# Patient Record
Sex: FEMALE | Race: WHITE | NOT HISPANIC OR LATINO | Employment: FULL TIME | URBAN - NONMETROPOLITAN AREA
[De-identification: names, ages, dates, MRNs, and addresses within clinical notes are randomized per-mention and may not be internally consistent; named-entity substitution may affect disease eponyms.]

---

## 2016-12-28 LAB — EXTERNAL GROUP B STREP ANTIGEN: NORMAL

## 2017-01-03 ENCOUNTER — ROUTINE PRENATAL (OUTPATIENT)
Dept: OBSTETRICS AND GYNECOLOGY | Facility: CLINIC | Age: 22
End: 2017-01-03

## 2017-01-03 DIAGNOSIS — N89.8 VAGINAL DISCHARGE DURING PREGNANCY IN THIRD TRIMESTER: Primary | ICD-10-CM

## 2017-01-03 DIAGNOSIS — O99.333 TOBACCO SMOKING COMPLICATING PREGNANCY IN THIRD TRIMESTER: ICD-10-CM

## 2017-01-03 DIAGNOSIS — O26.893 VAGINAL DISCHARGE DURING PREGNANCY IN THIRD TRIMESTER: Primary | ICD-10-CM

## 2017-01-03 DIAGNOSIS — O99.323 DRUG USE AFFECTING PREGNANCY IN THIRD TRIMESTER: ICD-10-CM

## 2017-01-03 DIAGNOSIS — Z3A.37 37 WEEKS GESTATION OF PREGNANCY: ICD-10-CM

## 2017-01-03 PROCEDURE — 99212 OFFICE O/P EST SF 10 MIN: CPT | Performed by: ADVANCED PRACTICE MIDWIFE

## 2017-01-03 NOTE — MR AVS SNAPSHOT
Marleny Conn   1/3/2017 11:30 AM   Routine Prenatal    Dept Phone:  [phone]   Encounter #:  [id number]    Provider:  Carmen Mohan CNM   Department:  [facility]                Your Full Care Plan              Your Updated Medication List          This list is accurate as of: 1/3/17 11:48 AM.  Always use your most recent med list.                docusate sodium 100 MG capsule   Commonly known as:  COLACE   Take 1 capsule by mouth Daily As Needed for constipation.       MYNATAL PLUS PO               Instructions     None    Patient Instructions History      Upcoming Appointments     Visit Type Date Time Department    OB FOLLOWUP 1/3/2017 11:30 AM [facility]    OB FOLLOWUP 2017 10:45 AM [facility] [place] [facility]      [employer] Signup     [employer] allows you to send messages to your doctor, view your test results, renew your prescriptions, schedule appointments, and more. To sign up, go to [employer] and click on the Sign Up Now link in the New User? box. Enter your [facility] Activation Code exactly as it appears below along with the last four digits of your Social Security Number and your Date of Birth () to complete the sign-up process. If you do not sign up before the expiration date, you must request a new code.    [facility] Activation Code: [email or web address]  Expires: 2017 11:13 AM    If you have questions, you can email "[email or web address] or call [phone] to talk to our [facility] staff. Remember, [facility] is NOT to be used for urgent needs. For medical emergencies, dial 911.               Other Info from Your Visit           Your Appointments     2017 10:45 AM [facility]   OB FOLLOWUP with PETTY Thomason   [facility] (--)    [address] [place] [address] Dr  [facility] [address] [place] [id number] [phone]              Other Notes About Your Plan     Obese  Smoker-counseled on  quiting        Allergies     No Known Allergies

## 2017-01-09 ENCOUNTER — ROUTINE PRENATAL (OUTPATIENT)
Dept: OBSTETRICS AND GYNECOLOGY | Facility: CLINIC | Age: 22
End: 2017-01-09

## 2017-01-09 DIAGNOSIS — O99.333 TOBACCO SMOKING COMPLICATING PREGNANCY IN THIRD TRIMESTER: Primary | ICD-10-CM

## 2017-01-09 DIAGNOSIS — Z3A.38 38 WEEKS GESTATION OF PREGNANCY: ICD-10-CM

## 2017-01-09 PROCEDURE — 99212 OFFICE O/P EST SF 10 MIN: CPT | Performed by: ADVANCED PRACTICE MIDWIFE

## 2017-01-09 NOTE — MR AVS SNAPSHOT
Marleny Conn   2017 10:45 AM   Routine Prenatal    Dept Phone:  [phone]   Encounter #:  [id number]    Provider:  PETTY Thomason   Department:  [facility]                Your Full Care Plan              Today's Medication Changes          These changes are accurate as of: 17 11:09 AM.  If you have any questions, ask your nurse or doctor.               Stop taking medication(s)listed here:     docusate sodium 100 MG capsule   Commonly known as:  COLACE                      Your Updated Medication List          This list is accurate as of: 17 11:09 AM.  Always use your most recent med list.                MYNATAL PLUS PO               You Were Diagnosed With        Codes Comments    Tobacco smoking complicating pregnancy in third trimester    -  Primary ICD-10-CM: O99.333  ICD-9-CM: 649.03     38 weeks gestation of pregnancy     ICD-10-CM: Z3A.38  ICD-9-CM: V22.2       Instructions     None    Patient Instructions History      Upcoming Appointments     Visit Type Date Time Department    OB FOLLOWUP 2017 10:45 AM [facility] [place] [place] [facility]    OB FOLLOWUP 2017 10:45 AM [facility] [place] [place] [facility]     [facility] [employer] allows you to send messages to your doctor, view your test results, renew your prescriptions, schedule appointments, and more. To sign up, go to [email or web address] and click on the Sign Up Now link in the New User? box. Enter your [employer] Activation Code exactly as it appears below along with the last four digits of your Social Security Number and your Date of Birth () to complete the sign-up process. If you do not sign up before the expiration date, you must request a new code.    [employer] Activation Code: [email or web address]  Expires: 2017 11:13 AM    If you have questions, you can email [email or web address] or call [phone] to talk to our [employer] staff. Remember, [employer] is NOT to be used for  urgent needs. For medical emergencies, dial 911.               Other Info from Your Visit           Your Appointments     Jan 20, 2017 10:45 AM CST   OB FOLLOWUP with PETTY Thomason   [facility] OB GYN (--)    [address] [place] [address] Dr  [facility] [address] [place] [id number] [phone]              Other Notes About Your Plan     Obese  Smoker-counseled on quiting        Allergies     No Known Allergies      Reason for Visit     Routine Prenatal Visit           Problems and Diagnoses Noted     Tobacco smoking complicating pregnancy in third trimester    38 weeks gestation of pregnancy

## 2017-01-11 ENCOUNTER — HOSPITAL ENCOUNTER (OUTPATIENT)
Dept: OTHER | Facility: HOSPITAL | Age: 22
Discharge: HOME | End: 2017-01-12
Attending: OBSTETRICS & GYNECOLOGY | Admitting: OBSTETRICS & GYNECOLOGY

## 2017-01-13 ENCOUNTER — HOSPITAL ENCOUNTER (OUTPATIENT)
Dept: OTHER | Facility: HOSPITAL | Age: 22
Discharge: HOME | End: 2017-01-13
Attending: OBSTETRICS & GYNECOLOGY | Admitting: OBSTETRICS & GYNECOLOGY

## 2017-01-20 ENCOUNTER — ROUTINE PRENATAL (OUTPATIENT)
Dept: OBSTETRICS AND GYNECOLOGY | Facility: CLINIC | Age: 22
End: 2017-01-20

## 2017-01-20 DIAGNOSIS — O99.333 TOBACCO SMOKING COMPLICATING PREGNANCY IN THIRD TRIMESTER: Primary | ICD-10-CM

## 2017-01-20 DIAGNOSIS — O48.0 POST TERM PREGNANCY, UNSPECIFIED EPISODE OF CARE: ICD-10-CM

## 2017-01-20 DIAGNOSIS — O99.323 DRUG USE AFFECTING PREGNANCY IN THIRD TRIMESTER: ICD-10-CM

## 2017-01-20 DIAGNOSIS — Z3A.39 39 WEEKS GESTATION OF PREGNANCY: ICD-10-CM

## 2017-01-20 DIAGNOSIS — F12.10 CANNABIS ABUSE, UNSPECIFIED: ICD-10-CM

## 2017-01-20 PROCEDURE — 99212 OFFICE O/P EST SF 10 MIN: CPT | Performed by: ADVANCED PRACTICE MIDWIFE

## 2017-01-20 NOTE — MR AVS SNAPSHOT
Marleny Conn   2017 10:45 AM   Routine Prenatal    Dept Phone:  [phone]   Encounter #:  [id number]    Provider:  PETTY Thomason   Department:  [facility]                Your Full Care Plan              Your Updated Medication List          This list is accurate as of: 17 11:15 AM.  Always use your most recent med list.                MYNATAL PLUS PO               You Were Diagnosed With        Codes Comments    Tobacco smoking complicating pregnancy in third trimester    -  Primary ICD-10-CM: O99.333  ICD-9-CM: 649.03     Post term pregnancy, unspecified episode of care     ICD-10-CM: O48.0  ICD-9-CM: 645.10     Drug use affecting pregnancy in third trimester     ICD-10-CM: O99.323  ICD-9-CM: 648.43, 305.90     Cannabis abuse, unspecified     ICD-10-CM: F12.10  ICD-9-CM: 305.20     39 weeks gestation of pregnancy     ICD-10-CM: Z3A.39  ICD-9-CM: V22.2       Instructions     None    Patient Instructions History      Upcoming Appointments     Visit Type Date Time Department    OB FOLLOWUP 2017 10:45 AM [facility]    US FET BIO PRO WO N STR TST AD 2017  8:30 AM [facility]  MFM    OB FOLLOWUP 2017 10:30 AM [facility]     [facility] [employer] allows you to send messages to your doctor, view your test results, renew your prescriptions, schedule appointments, and more. To sign up, go to [employer] and click on the Sign Up Now link in the New User? box. Enter your [employer] Activation Code exactly as it appears below along with the last four digits of your Social Security Number and your Date of Birth () to complete the sign-up process. If you do not sign up before the expiration date, you must request a new code.    [employer] Activation Code: [email or web address]  Expires: 2/3/2017 11:09 AM    If you have questions, you can email [email or web address] or call [phone] to talk to our  MyChart staff. Remember, MyChart is NOT to be used for urgent needs. For medical emergencies, dial 911.               Other Info from Your Visit           Your Appointments     Jan 27, 2017  8:30 AM CST   US fet bio pro wo n str tst ad with [facility] MFM US 2   [facility]TR[facility] ([facility]    [address] [place] [address] [phone]           No prep required            Jan 27, 2017 10:30 AM CST   OB FOLLOWUP with PETTY Thomason   [facility] (--)    [address] [place] [address] Dr  [facility] [address] [place] [id number] [phone]              Other Notes About Your Plan     Obese  Smoker-counseled on quiting        Allergies     No Known Allergies      Reason for Visit     Routine Prenatal Visit           Problems and Diagnoses Noted     Drug use complicating pregnancy in third trimester    Tobacco smoking complicating pregnancy in third trimester    Post term pregnancy        Marijuana use        39 weeks gestation of pregnancy

## 2017-01-21 ENCOUNTER — HOSPITAL ENCOUNTER (OUTPATIENT)
Facility: HOSPITAL | Age: 22
Discharge: HOME | End: 2017-01-21
Attending: OBSTETRICS & GYNECOLOGY | Admitting: OBSTETRICS & GYNECOLOGY

## 2017-01-21 VITALS
DIASTOLIC BLOOD PRESSURE: 89 MMHG | HEART RATE: 82 BPM | RESPIRATION RATE: 18 BRPM | BODY MASS INDEX: 42.49 KG/M2 | HEIGHT: 65 IN | SYSTOLIC BLOOD PRESSURE: 132 MMHG | WEIGHT: 255 LBS | OXYGEN SATURATION: 99 % | TEMPERATURE: 98.6 F

## 2017-01-21 RX ORDER — SODIUM CHLORIDE 0.9 % (FLUSH) 0.9 %
1-10 SYRINGE (ML) INJECTION AS NEEDED
Status: DISCONTINUED | OUTPATIENT
Start: 2017-01-21 | End: 2017-01-21

## 2017-01-21 RX ORDER — DEXTROSE, SODIUM CHLORIDE, SODIUM LACTATE, POTASSIUM CHLORIDE, AND CALCIUM CHLORIDE 5; .6; .31; .03; .02 G/100ML; G/100ML; G/100ML; G/100ML; G/100ML
999 INJECTION, SOLUTION INTRAVENOUS ONCE
Status: DISCONTINUED | OUTPATIENT
Start: 2017-01-21 | End: 2017-01-21

## 2017-01-21 RX ORDER — DEXTROSE, SODIUM CHLORIDE, SODIUM LACTATE, POTASSIUM CHLORIDE, AND CALCIUM CHLORIDE 5; .6; .31; .03; .02 G/100ML; G/100ML; G/100ML; G/100ML; G/100ML
INJECTION, SOLUTION INTRAVENOUS
Status: DISCONTINUED
Start: 2017-01-21 | End: 2017-01-21 | Stop reason: HOSPADM

## 2017-01-22 ENCOUNTER — ANESTHESIA (OUTPATIENT)
Dept: LABOR AND DELIVERY | Facility: HOSPITAL | Age: 22
End: 2017-01-22

## 2017-01-22 ENCOUNTER — ANESTHESIA EVENT (OUTPATIENT)
Dept: LABOR AND DELIVERY | Facility: HOSPITAL | Age: 22
End: 2017-01-22

## 2017-01-22 ENCOUNTER — HOSPITAL ENCOUNTER (INPATIENT)
Facility: HOSPITAL | Age: 22
LOS: 2 days | Discharge: HOME | End: 2017-01-24
Attending: OBSTETRICS & GYNECOLOGY | Admitting: OBSTETRICS & GYNECOLOGY

## 2017-01-22 DIAGNOSIS — O99.333 TOBACCO SMOKING COMPLICATING PREGNANCY IN THIRD TRIMESTER: Primary | ICD-10-CM

## 2017-01-22 DIAGNOSIS — Z34.03 ENCOUNTER FOR PRENATAL CARE IN THIRD TRIMESTER OF FIRST PREGNANCY: ICD-10-CM

## 2017-01-22 DIAGNOSIS — O99.323 DRUG USE AFFECTING PREGNANCY IN THIRD TRIMESTER: ICD-10-CM

## 2017-01-22 LAB
ABO GROUP BLD: NORMAL
AMPHET+METHAMPHET UR QL: NEGATIVE
BARBITURATES UR QL SCN: NEGATIVE
BENZODIAZ UR QL SCN: NEGATIVE
BLD GP AB SCN SERPL QL: NEGATIVE
CANNABINOIDS SERPL QL: NEGATIVE
COCAINE UR QL: NEGATIVE
DEPRECATED RDW RBC AUTO: 41.2 FL (ref 36.4–46.3)
ERYTHROCYTE [DISTWIDTH] IN BLOOD BY AUTOMATED COUNT: 12.8 % (ref 11.5–14.5)
HCT VFR BLD AUTO: 37.1 % (ref 35–45)
HGB BLD-MCNC: 12.8 G/DL (ref 12–15.5)
Lab: NORMAL
MCH RBC QN AUTO: 30.7 PG (ref 26.5–34)
MCHC RBC AUTO-ENTMCNC: 34.5 G/DL (ref 31.4–36)
MCV RBC AUTO: 89 FL (ref 80–98)
METHADONE UR QL SCN: NEGATIVE
OPIATES UR QL: NEGATIVE
OXYCODONE UR QL SCN: NEGATIVE
PLATELET # BLD AUTO: 229 10*3/MM3 (ref 150–450)
PMV BLD AUTO: 11 FL (ref 8–12)
RBC # BLD AUTO: 4.17 10*6/MM3 (ref 3.77–5.16)
RH BLD: NEGATIVE
WBC NRBC COR # BLD: 23 10*3/MM3 (ref 3.2–9.8)

## 2017-01-22 PROCEDURE — 86900 BLOOD TYPING SEROLOGIC ABO: CPT

## 2017-01-22 PROCEDURE — 80307 DRUG TEST PRSMV CHEM ANLYZR: CPT

## 2017-01-22 PROCEDURE — 0KQM0ZZ REPAIR PERINEUM MUSCLE, OPEN APPROACH: ICD-10-PCS | Performed by: OBSTETRICS & GYNECOLOGY

## 2017-01-22 PROCEDURE — 25010000003 PENICILLIN G POTASSIUM PER 600000 UNITS: Performed by: OBSTETRICS & GYNECOLOGY

## 2017-01-22 PROCEDURE — 86901 BLOOD TYPING SEROLOGIC RH(D): CPT

## 2017-01-22 PROCEDURE — 59410 OBSTETRICAL CARE: CPT | Performed by: OBSTETRICS & GYNECOLOGY

## 2017-01-22 PROCEDURE — 25010000002 FENTANYL CITRATE (PF) 100 MCG/2ML SOLUTION 20 ML AMPULE: Performed by: OBSTETRICS & GYNECOLOGY

## 2017-01-22 PROCEDURE — C1755 CATHETER, INTRASPINAL: HCPCS

## 2017-01-22 PROCEDURE — 59025 FETAL NON-STRESS TEST: CPT

## 2017-01-22 PROCEDURE — 51702 INSERT TEMP BLADDER CATH: CPT

## 2017-01-22 PROCEDURE — 80307 DRUG TEST PRSMV CHEM ANLYZR: CPT | Performed by: OBSTETRICS & GYNECOLOGY

## 2017-01-22 PROCEDURE — 85027 COMPLETE CBC AUTOMATED: CPT | Performed by: OBSTETRICS & GYNECOLOGY

## 2017-01-22 PROCEDURE — G0463 HOSPITAL OUTPT CLINIC VISIT: HCPCS

## 2017-01-22 PROCEDURE — 86850 RBC ANTIBODY SCREEN: CPT

## 2017-01-22 RX ORDER — ERYTHROMYCIN 5 MG/G
OINTMENT OPHTHALMIC
Status: DISPENSED
Start: 2017-01-22 | End: 2017-01-23

## 2017-01-22 RX ORDER — MISOPROSTOL 200 UG/1
800 TABLET ORAL AS NEEDED
Status: DISCONTINUED | OUTPATIENT
Start: 2017-01-22 | End: 2017-01-22 | Stop reason: HOSPADM

## 2017-01-22 RX ORDER — PHYTONADIONE 1 MG/.5ML
INJECTION, EMULSION INTRAMUSCULAR; INTRAVENOUS; SUBCUTANEOUS
Status: DISPENSED
Start: 2017-01-22 | End: 2017-01-23

## 2017-01-22 RX ORDER — PROMETHAZINE HYDROCHLORIDE 25 MG/ML
12.5 INJECTION, SOLUTION INTRAMUSCULAR; INTRAVENOUS EVERY 6 HOURS PRN
Status: DISCONTINUED | OUTPATIENT
Start: 2017-01-22 | End: 2017-01-22 | Stop reason: HOSPADM

## 2017-01-22 RX ORDER — BISACODYL 10 MG
10 SUPPOSITORY, RECTAL RECTAL DAILY PRN
Status: DISCONTINUED | OUTPATIENT
Start: 2017-01-23 | End: 2017-01-24 | Stop reason: HOSPADM

## 2017-01-22 RX ORDER — PRENATAL VIT/IRON FUM/FOLIC AC 27MG-0.8MG
1 TABLET ORAL DAILY
Status: DISCONTINUED | OUTPATIENT
Start: 2017-01-22 | End: 2017-01-24 | Stop reason: HOSPADM

## 2017-01-22 RX ORDER — SODIUM CHLORIDE, SODIUM LACTATE, POTASSIUM CHLORIDE, CALCIUM CHLORIDE 600; 310; 30; 20 MG/100ML; MG/100ML; MG/100ML; MG/100ML
150 INJECTION, SOLUTION INTRAVENOUS CONTINUOUS
Status: DISCONTINUED | OUTPATIENT
Start: 2017-01-22 | End: 2017-01-22

## 2017-01-22 RX ORDER — DOCUSATE SODIUM 100 MG/1
100 CAPSULE, LIQUID FILLED ORAL 2 TIMES DAILY
Status: DISCONTINUED | OUTPATIENT
Start: 2017-01-22 | End: 2017-01-24 | Stop reason: HOSPADM

## 2017-01-22 RX ORDER — LIDOCAINE HYDROCHLORIDE 10 MG/ML
5 INJECTION, SOLUTION INFILTRATION; PERINEURAL AS NEEDED
Status: DISCONTINUED | OUTPATIENT
Start: 2017-01-22 | End: 2017-01-22 | Stop reason: HOSPADM

## 2017-01-22 RX ORDER — MAGNESIUM CARB/ALUMINUM HYDROX 105-160MG
TABLET,CHEWABLE ORAL
Status: DISPENSED
Start: 2017-01-22 | End: 2017-01-23

## 2017-01-22 RX ORDER — PROMETHAZINE HYDROCHLORIDE 12.5 MG/1
12.5 SUPPOSITORY RECTAL EVERY 6 HOURS PRN
Status: DISCONTINUED | OUTPATIENT
Start: 2017-01-22 | End: 2017-01-22 | Stop reason: HOSPADM

## 2017-01-22 RX ORDER — SODIUM CHLORIDE, SODIUM LACTATE, POTASSIUM CHLORIDE, CALCIUM CHLORIDE 600; 310; 30; 20 MG/100ML; MG/100ML; MG/100ML; MG/100ML
125 INJECTION, SOLUTION INTRAVENOUS CONTINUOUS
Status: DISCONTINUED | OUTPATIENT
Start: 2017-01-22 | End: 2017-01-22

## 2017-01-22 RX ORDER — OXYCODONE HYDROCHLORIDE AND ACETAMINOPHEN 5; 325 MG/1; MG/1
1 TABLET ORAL EVERY 4 HOURS PRN
Status: DISCONTINUED | OUTPATIENT
Start: 2017-01-22 | End: 2017-01-24 | Stop reason: HOSPADM

## 2017-01-22 RX ORDER — SODIUM CHLORIDE 0.9 % (FLUSH) 0.9 %
1-10 SYRINGE (ML) INJECTION AS NEEDED
Status: DISCONTINUED | OUTPATIENT
Start: 2017-01-22 | End: 2017-01-22 | Stop reason: HOSPADM

## 2017-01-22 RX ORDER — LIDOCAINE HYDROCHLORIDE AND EPINEPHRINE BITARTRATE 20; .01 MG/ML; MG/ML
INJECTION, SOLUTION SUBCUTANEOUS AS NEEDED
Status: DISCONTINUED | OUTPATIENT
Start: 2017-01-22 | End: 2017-01-22 | Stop reason: SURG

## 2017-01-22 RX ORDER — ZOLPIDEM TARTRATE 5 MG/1
5 TABLET ORAL NIGHTLY PRN
Status: DISCONTINUED | OUTPATIENT
Start: 2017-01-22 | End: 2017-01-24 | Stop reason: HOSPADM

## 2017-01-22 RX ORDER — CARBOPROST TROMETHAMINE 250 UG/ML
250 INJECTION, SOLUTION INTRAMUSCULAR AS NEEDED
Status: DISCONTINUED | OUTPATIENT
Start: 2017-01-22 | End: 2017-01-22 | Stop reason: HOSPADM

## 2017-01-22 RX ORDER — OXYTOCIN/RINGER'S LACTATE 20/1000 ML
1000 PLASTIC BAG, INJECTION (ML) INTRAVENOUS CONTINUOUS
Status: DISCONTINUED | OUTPATIENT
Start: 2017-01-22 | End: 2017-01-22

## 2017-01-22 RX ORDER — PROMETHAZINE HYDROCHLORIDE 12.5 MG/1
12.5 TABLET ORAL EVERY 6 HOURS PRN
Status: DISCONTINUED | OUTPATIENT
Start: 2017-01-22 | End: 2017-01-22 | Stop reason: HOSPADM

## 2017-01-22 RX ORDER — SODIUM CHLORIDE, SODIUM LACTATE, POTASSIUM CHLORIDE, CALCIUM CHLORIDE 600; 310; 30; 20 MG/100ML; MG/100ML; MG/100ML; MG/100ML
INJECTION, SOLUTION INTRAVENOUS
Status: COMPLETED
Start: 2017-01-22 | End: 2017-01-22

## 2017-01-22 RX ORDER — SODIUM CHLORIDE 0.9 % (FLUSH) 0.9 %
1-10 SYRINGE (ML) INJECTION AS NEEDED
Status: DISCONTINUED | OUTPATIENT
Start: 2017-01-22 | End: 2017-01-24 | Stop reason: HOSPADM

## 2017-01-22 RX ORDER — ONDANSETRON 2 MG/ML
4 INJECTION INTRAMUSCULAR; INTRAVENOUS ONCE AS NEEDED
Status: DISCONTINUED | OUTPATIENT
Start: 2017-01-22 | End: 2017-01-22 | Stop reason: HOSPADM

## 2017-01-22 RX ORDER — OXYTOCIN/RINGER'S LACTATE 20/1000 ML
999 PLASTIC BAG, INJECTION (ML) INTRAVENOUS CONTINUOUS PRN
Status: DISCONTINUED | OUTPATIENT
Start: 2017-01-22 | End: 2017-01-22

## 2017-01-22 RX ORDER — METHYLERGONOVINE MALEATE 0.2 MG/ML
200 INJECTION INTRAVENOUS ONCE AS NEEDED
Status: DISCONTINUED | OUTPATIENT
Start: 2017-01-22 | End: 2017-01-22 | Stop reason: HOSPADM

## 2017-01-22 RX ORDER — IBUPROFEN 800 MG/1
800 TABLET ORAL EVERY 6 HOURS SCHEDULED
Status: COMPLETED | OUTPATIENT
Start: 2017-01-22 | End: 2017-01-24

## 2017-01-22 RX ADMIN — SODIUM CHLORIDE 5 MILLION UNITS: 9 INJECTION, SOLUTION INTRAVENOUS at 11:31

## 2017-01-22 RX ADMIN — DOCUSATE SODIUM 100 MG: 100 CAPSULE, LIQUID FILLED ORAL at 19:45

## 2017-01-22 RX ADMIN — IBUPROFEN 800 MG: 800 TABLET ORAL at 19:44

## 2017-01-22 RX ADMIN — SODIUM CHLORIDE, POTASSIUM CHLORIDE, SODIUM LACTATE AND CALCIUM CHLORIDE 1000 ML: 600; 310; 30; 20 INJECTION, SOLUTION INTRAVENOUS at 10:30

## 2017-01-22 RX ADMIN — FENTANYL CITRATE 6 ML/HR: 50 INJECTION, SOLUTION INTRAMUSCULAR; INTRAVENOUS at 11:19

## 2017-01-22 RX ADMIN — Medication 999 ML/HR: at 14:29

## 2017-01-22 RX ADMIN — SODIUM CHLORIDE, POTASSIUM CHLORIDE, SODIUM LACTATE AND CALCIUM CHLORIDE 125 ML/HR: 600; 310; 30; 20 INJECTION, SOLUTION INTRAVENOUS at 11:15

## 2017-01-22 RX ADMIN — LIDOCAINE HYDROCHLORIDE AND EPINEPHRINE BITARTRATE 3 ML: 20; 10 INJECTION, SOLUTION SUBCUTANEOUS at 11:14

## 2017-01-22 NOTE — ANESTHESIA PROCEDURE NOTES
Labor Epidural    Patient location during procedure: OB  Performed By  CRNA: ADELINE DAVIS  Preanesthetic Checklist  Completed: patient identified, site marked, surgical consent, pre-op evaluation, timeout performed, IV checked, risks and benefits discussed and monitors and equipment checked  Epidural Block Prep:  Pt Position:sitting  Sterile Tech:gloves, cap, gown, mask and sterile barrier  Monitoring:blood pressure monitoring and continuous pulse oximetry  Epidural Block Procedure:  Approach:midline  Location:L3-L4  Needle Type:Tuohy  Needle Gauge:17 G  Loss of Resistance: 8cm  Cath Depth at skin:12 cm  Paresthesia: none  Aspiration:negative  Test Dose:negative  Post Assessment:  Dressing:occlusive dressing applied and secured with tape  Pt Tolerance:patient tolerated the procedure well with no apparent complications  Complications:no

## 2017-01-22 NOTE — ANESTHESIA PREPROCEDURE EVALUATION
Anesthesia Evaluation     Patient summary reviewed and Nursing notes reviewed    Airway   Mallampati: II  TM distance: >3 FB  Neck ROM: full  no difficulty expected  Dental - normal exam     Pulmonary - negative pulmonary ROS and normal exam   Cardiovascular - negative cardio ROS and normal exam    Neuro/Psych- negative ROS  GI/Hepatic/Renal/Endo    (+) morbid obesity,     Musculoskeletal (-) negative ROS    Abdominal  - normal exam   Substance History - negative use     OB/GYN    (+) Pregnant,         Other                             Anesthesia Plan    ASA 3     epidural     Anesthetic plan and risks discussed with patient.

## 2017-01-22 NOTE — ANESTHESIA POSTPROCEDURE EVALUATION
Patient: Marleny Conn    Procedure Summary     Date Anesthesia Start Anesthesia Stop Room / Location    01/22/17 [id number]        Procedure Diagnosis Scheduled Providers Provider    LABOR ANALGESIA No diagnosis on file.  Aurelio Guerrero CRNA          Anesthesia Type: epidural  Last vitals  /64 (01/22/17 1445)    Temp      Pulse 116 (01/22/17 1445)   Resp      SpO2        Post Anesthesia Care and Evaluation    Patient location during evaluation: bedside  Level of consciousness: awake and alert  Pain management: adequate  Airway patency: patent  Anesthetic complications: No anesthetic complications  PONV Status: none  Cardiovascular status: acceptable  Respiratory status: acceptable  Hydration status: acceptable  Post Neuraxial Block status: Motor and sensory function returned to baseline

## 2017-01-23 RX ADMIN — PRENATAL VIT W/ FE FUMARATE-FA TAB 27-0.8 MG 1 TABLET: 27-0.8 TAB at 08:05

## 2017-01-23 RX ADMIN — IBUPROFEN 800 MG: 800 TABLET ORAL at 18:11

## 2017-01-23 RX ADMIN — IBUPROFEN 800 MG: 800 TABLET ORAL at 12:46

## 2017-01-23 RX ADMIN — DOCUSATE SODIUM 100 MG: 100 CAPSULE, LIQUID FILLED ORAL at 21:07

## 2017-01-23 RX ADMIN — DOCUSATE SODIUM 100 MG: 100 CAPSULE, LIQUID FILLED ORAL at 08:05

## 2017-01-23 RX ADMIN — IBUPROFEN 800 MG: 800 TABLET ORAL at 00:43

## 2017-01-23 RX ADMIN — IBUPROFEN 800 MG: 800 TABLET ORAL at 06:08

## 2017-01-24 VITALS
BODY MASS INDEX: 42.49 KG/M2 | SYSTOLIC BLOOD PRESSURE: 138 MMHG | OXYGEN SATURATION: 97 % | WEIGHT: 255 LBS | RESPIRATION RATE: 18 BRPM | HEART RATE: 83 BPM | DIASTOLIC BLOOD PRESSURE: 70 MMHG | HEIGHT: 65 IN | TEMPERATURE: 97.8 F

## 2017-01-24 PROBLEM — Z34.03 ENCOUNTER FOR PRENATAL CARE IN THIRD TRIMESTER OF FIRST PREGNANCY: Status: RESOLVED | Noted: 2017-01-22 | Resolved: 2017-01-24

## 2017-01-24 RX ORDER — IBUPROFEN 800 MG/1
800 TABLET ORAL EVERY 6 HOURS PRN
Qty: 30 TABLET | Refills: 0 | Status: SHIPPED | OUTPATIENT
Start: 2017-01-24 | End: 2017-01-25

## 2017-01-24 RX ADMIN — PRENATAL VIT W/ FE FUMARATE-FA TAB 27-0.8 MG 1 TABLET: 27-0.8 TAB at 08:37

## 2017-01-24 RX ADMIN — IBUPROFEN 800 MG: 800 TABLET ORAL at 00:25

## 2017-01-24 RX ADMIN — IBUPROFEN 800 MG: 800 TABLET ORAL at 11:52

## 2017-01-24 RX ADMIN — IBUPROFEN 800 MG: 800 TABLET ORAL at 05:53

## 2017-01-24 RX ADMIN — DOCUSATE SODIUM 100 MG: 100 CAPSULE, LIQUID FILLED ORAL at 08:37

## 2017-02-10 ENCOUNTER — OFFICE VISIT (OUTPATIENT)
Dept: OBSTETRICS AND GYNECOLOGY | Facility: CLINIC | Age: 22
End: 2017-02-10

## 2017-02-10 VITALS
DIASTOLIC BLOOD PRESSURE: 74 MMHG | WEIGHT: 229 LBS | BODY MASS INDEX: 39.09 KG/M2 | SYSTOLIC BLOOD PRESSURE: 117 MMHG | HEIGHT: 64 IN

## 2017-02-10 DIAGNOSIS — Z30.011 ORAL CONTRACEPTION INITIAL PRESCRIPTION: ICD-10-CM

## 2017-02-10 PROCEDURE — 99024 POSTOP FOLLOW-UP VISIT: CPT | Performed by: ADVANCED PRACTICE MIDWIFE

## 2017-02-10 RX ORDER — NORETHINDRONE ACETATE AND ETHINYL ESTRADIOL 1MG-20(21)
1 KIT ORAL DAILY
Qty: 28 TABLET | Refills: 12 | Status: SHIPPED | OUTPATIENT
Start: 2017-02-10 | End: 2017-03-10

## 2017-02-10 RX ORDER — IBUPROFEN 800 MG/1
800 TABLET ORAL EVERY 6 HOURS PRN
COMMUNITY
End: 2018-02-27 | Stop reason: HOSPADM

## 2017-02-10 NOTE — PROGRESS NOTES
"Subjective   Marleny Conn is a 21 y.o. female who presents for a postpartum visit. She is 2 weeks postpartum following a spontaneous vaginal delivery. I have fully reviewed the prenatal and intrapartum course. The delivery was at 40  gestational weeks. Outcome: vacuum, low. Anesthesia: epidural. Postpartum course has been unremarkable. Baby's course has been unremarkable. Baby is feeding by formula. Bleeding thin lochia. Bowel function is normal. Bladder function is normal. Patient is not sexually active. Contraception method is none. Postpartum depression screening: negative.    The following portions of the patient's history were reviewed and updated as appropriate: allergies, current medications, past family history, past medical history, past social history, past surgical history and problem list.    Review of Systems  Constitutional: negative  Eyes: negative  Ears, nose, mouth, throat, and face: negative  Respiratory: negative  Cardiovascular: negative  Gastrointestinal: negative  Genitourinary:negative    Objective   Visit Vitals   • /74   • Ht 64\" (162.6 cm)   • Wt 229 lb (104 kg)   • LMP 04/17/2016 (Approximate)   • BMI 39.31 kg/m2      General:  alert, appears stated age and cooperative    Breasts:  inspection negative, no nipple discharge or bleeding, no masses or nodularity palpable   Lungs: clear to auscultation bilaterally   Heart:  regular rate and rhythm, S1, S2 normal, no murmur, click, rub or gallop   Abdomen: soft, non-tender; bowel sounds normal; no masses,  no organomegaly   skin Warm to touch, no rash   muskuloskeletal  Normal gait, full ROM   HEENT BRE, normocephalic                  Assessment/Plan   2 weeks postpartum exam. Pap smear not done at today's visit. Last pap 7/5/16 negative     1. Contraception: OCP (estrogen/progesterone).  Recommended walking.  Advised patient patient to take prenatal vitamins daily or folic acid daily, patient verbalized understanding.  2.  " Patient is to have pelvic rest for the next 4 weeks  3. Follow up in: 4 weeks or as needed.

## 2017-03-08 ENCOUNTER — OFFICE VISIT (OUTPATIENT)
Dept: OBSTETRICS AND GYNECOLOGY | Facility: CLINIC | Age: 22
End: 2017-03-08

## 2017-03-08 VITALS
WEIGHT: 220 LBS | DIASTOLIC BLOOD PRESSURE: 69 MMHG | SYSTOLIC BLOOD PRESSURE: 115 MMHG | BODY MASS INDEX: 37.56 KG/M2 | HEIGHT: 64 IN

## 2017-03-08 PROCEDURE — 99024 POSTOP FOLLOW-UP VISIT: CPT | Performed by: ADVANCED PRACTICE MIDWIFE

## 2017-03-08 RX ORDER — ESCITALOPRAM OXALATE 10 MG/1
10 TABLET ORAL DAILY
Qty: 30 TABLET | Refills: 10 | Status: SHIPPED | OUTPATIENT
Start: 2017-03-08 | End: 2018-02-27 | Stop reason: HOSPADM

## 2017-03-08 NOTE — PROGRESS NOTES
"Subjective   Marleny Conn is a 21 y.o. female who presents for a postpartum visit. She is 6 weeks postpartum following a vacuum assisted. I have fully reviewed the prenatal and intrapartum course. The delivery was at 40 gestational weeks. Outcome: spontaneous vaginal delivery and vacuum, low. Anesthesia: epidural. Postpartum course has been unremarkable. Baby's course has been unremarkable. Baby is feeding by formula. Bleeding started period 3/7/14. Bowel function is normal. Bladder function is normal. Patient is not sexually active. Contraception method is OCP (estrogen/progesterone). Postpartum depression screening: positive.    The following portions of the patient's history were reviewed and updated as appropriate: allergies, current medications, past family history, past medical history, past social history, past surgical history and problem list.    Review of Systems  Constitutional: negative  Eyes: negative  Ears, nose, mouth, throat, and face: negative  Respiratory: negative  Cardiovascular: negative  Gastrointestinal: negative  Genitourinary:negative    Objective   Visit Vitals   • /69   • Ht 64\" (162.6 cm)   • Wt 220 lb (99.8 kg)   • LMP 03/07/2016   • Breastfeeding No   • BMI 37.76 kg/m2      General:  alert, appears stated age and cooperative   HEENT BRE, normocephalic, no thyroidmegaly   Lungs: clear to auscultation bilaterally   Heart:  regular rate and rhythm, S1, S2 normal, no murmur, click, rub or gallop   Abdomen: soft, non-tender; bowel sounds normal; no masses,  no organomegaly   skin Warm to touch, no skin rash   muskuloskeletal  Full ROM, normal gait    Affect/psych Normal affect, answered questions properly                 Assessment/Plan   6 weeks postpartum exam. Pap smear not done at today's visit. Needs pap in July 1. Contraception: OCP (estrogen/progesterone)  2. Depression- Started patient on lexapro, patient requested low dose, encouraged patient to call if she " doesn't see improvement in 4 weeks. To report suicidal thoughts   3. Follow up in: 4 months or as needed.  4. Patient declined TSH check, reported problems swallowing     Marleny was seen today for postpartum care.    Diagnoses and all orders for this visit:    Routine postpartum follow-up  -     TSH    Postpartum depression    Other orders  -     escitalopram (LEXAPRO) 10 MG tablet; Take 1 tablet by mouth Daily.

## 2018-02-27 ENCOUNTER — INITIAL PRENATAL (OUTPATIENT)
Dept: OBSTETRICS AND GYNECOLOGY | Facility: CLINIC | Age: 23
End: 2018-02-27

## 2018-02-27 ENCOUNTER — APPOINTMENT (OUTPATIENT)
Dept: LAB | Facility: HOSPITAL | Age: 23
End: 2018-02-27

## 2018-02-27 VITALS — SYSTOLIC BLOOD PRESSURE: 100 MMHG | DIASTOLIC BLOOD PRESSURE: 59 MMHG | BODY MASS INDEX: 39.14 KG/M2 | WEIGHT: 228 LBS

## 2018-02-27 DIAGNOSIS — O09.30 INSUFFICIENT ANTEPARTUM CARE: ICD-10-CM

## 2018-02-27 DIAGNOSIS — Z3A.22 22 WEEKS GESTATION OF PREGNANCY: ICD-10-CM

## 2018-02-27 DIAGNOSIS — O99.210 OBESITY AFFECTING PREGNANCY, ANTEPARTUM: Primary | ICD-10-CM

## 2018-02-27 DIAGNOSIS — Z34.80 PRENATAL CARE OF MULTIGRAVIDA, ANTEPARTUM: ICD-10-CM

## 2018-02-27 DIAGNOSIS — Z36.89 ENCOUNTER FOR FETAL ANATOMIC SURVEY: ICD-10-CM

## 2018-02-27 PROBLEM — O26.899 RH NEGATIVE STATE IN ANTEPARTUM PERIOD: Status: ACTIVE | Noted: 2018-02-27

## 2018-02-27 PROBLEM — Z67.91 RH NEGATIVE STATE IN ANTEPARTUM PERIOD: Status: ACTIVE | Noted: 2018-02-27

## 2018-02-27 LAB
ABO GROUP BLD: NORMAL
AMPHET+METHAMPHET UR QL: NEGATIVE
BARBITURATES UR QL SCN: NEGATIVE
BASOPHILS # BLD AUTO: 0.01 10*3/MM3 (ref 0–0.2)
BASOPHILS NFR BLD AUTO: 0.1 % (ref 0–2)
BENZODIAZ UR QL SCN: NEGATIVE
BILIRUB UR QL STRIP: NEGATIVE
BLD GP AB SCN SERPL QL: NEGATIVE
CANNABINOIDS SERPL QL: NEGATIVE
CLARITY UR: ABNORMAL
COCAINE UR QL: NEGATIVE
COLOR UR: YELLOW
DEPRECATED RDW RBC AUTO: 40 FL (ref 36.4–46.3)
EOSINOPHIL # BLD AUTO: 0.26 10*3/MM3 (ref 0–0.7)
EOSINOPHIL NFR BLD AUTO: 1.8 % (ref 0–7)
ERYTHROCYTE [DISTWIDTH] IN BLOOD BY AUTOMATED COUNT: 12 % (ref 11.5–14.5)
GLUCOSE UR STRIP-MCNC: NEGATIVE MG/DL
HCT VFR BLD AUTO: 36.4 % (ref 35–45)
HGB BLD-MCNC: 12.5 G/DL (ref 12–15.5)
HGB UR QL STRIP.AUTO: NEGATIVE
IMM GRANULOCYTES # BLD: 0.07 10*3/MM3 (ref 0–0.02)
IMM GRANULOCYTES NFR BLD: 0.5 % (ref 0–0.5)
KETONES UR QL STRIP: NEGATIVE
LEUKOCYTE ESTERASE UR QL STRIP.AUTO: ABNORMAL
LYMPHOCYTES # BLD AUTO: 3.24 10*3/MM3 (ref 0.6–4.2)
LYMPHOCYTES NFR BLD AUTO: 21.9 % (ref 10–50)
Lab: NORMAL
MCH RBC QN AUTO: 31.3 PG (ref 26.5–34)
MCHC RBC AUTO-ENTMCNC: 34.3 G/DL (ref 31.4–36)
MCV RBC AUTO: 91 FL (ref 80–98)
METHADONE UR QL SCN: NEGATIVE
MONOCYTES # BLD AUTO: 0.63 10*3/MM3 (ref 0–0.9)
MONOCYTES NFR BLD AUTO: 4.3 % (ref 0–12)
NEUTROPHILS # BLD AUTO: 10.6 10*3/MM3 (ref 2–8.6)
NEUTROPHILS NFR BLD AUTO: 71.4 % (ref 37–80)
NITRITE UR QL STRIP: NEGATIVE
OPIATES UR QL: NEGATIVE
OXYCODONE UR QL SCN: NEGATIVE
PH UR STRIP.AUTO: 6 [PH] (ref 5–9)
PLATELET # BLD AUTO: 235 10*3/MM3 (ref 150–450)
PMV BLD AUTO: 10.4 FL (ref 8–12)
PROT UR QL STRIP: NEGATIVE
RBC # BLD AUTO: 4 10*6/MM3 (ref 3.77–5.16)
RH BLD: NEGATIVE
SP GR UR STRIP: 1.02 (ref 1–1.03)
UROBILINOGEN UR QL STRIP: ABNORMAL
WBC NRBC COR # BLD: 14.81 10*3/MM3 (ref 3.2–9.8)

## 2018-02-27 PROCEDURE — 80307 DRUG TEST PRSMV CHEM ANLYZR: CPT | Performed by: ADVANCED PRACTICE MIDWIFE

## 2018-02-27 PROCEDURE — 86803 HEPATITIS C AB TEST: CPT | Performed by: ADVANCED PRACTICE MIDWIFE

## 2018-02-27 PROCEDURE — 87086 URINE CULTURE/COLONY COUNT: CPT | Performed by: ADVANCED PRACTICE MIDWIFE

## 2018-02-27 PROCEDURE — 87491 CHLMYD TRACH DNA AMP PROBE: CPT | Performed by: ADVANCED PRACTICE MIDWIFE

## 2018-02-27 PROCEDURE — 86900 BLOOD TYPING SEROLOGIC ABO: CPT | Performed by: ADVANCED PRACTICE MIDWIFE

## 2018-02-27 PROCEDURE — 87591 N.GONORRHOEAE DNA AMP PROB: CPT | Performed by: ADVANCED PRACTICE MIDWIFE

## 2018-02-27 PROCEDURE — 87661 TRICHOMONAS VAGINALIS AMPLIF: CPT | Performed by: ADVANCED PRACTICE MIDWIFE

## 2018-02-27 PROCEDURE — G0432 EIA HIV-1/HIV-2 SCREEN: HCPCS | Performed by: ADVANCED PRACTICE MIDWIFE

## 2018-02-27 PROCEDURE — 36415 COLL VENOUS BLD VENIPUNCTURE: CPT | Performed by: ADVANCED PRACTICE MIDWIFE

## 2018-02-27 PROCEDURE — 81003 URINALYSIS AUTO W/O SCOPE: CPT | Performed by: ADVANCED PRACTICE MIDWIFE

## 2018-02-27 PROCEDURE — 99213 OFFICE O/P EST LOW 20 MIN: CPT | Performed by: ADVANCED PRACTICE MIDWIFE

## 2018-02-27 PROCEDURE — 85025 COMPLETE CBC W/AUTO DIFF WBC: CPT | Performed by: ADVANCED PRACTICE MIDWIFE

## 2018-02-27 PROCEDURE — 86901 BLOOD TYPING SEROLOGIC RH(D): CPT | Performed by: ADVANCED PRACTICE MIDWIFE

## 2018-02-27 PROCEDURE — 86850 RBC ANTIBODY SCREEN: CPT | Performed by: ADVANCED PRACTICE MIDWIFE

## 2018-02-27 RX ORDER — PRENATAL VIT/IRON FUM/FOLIC AC 65 MG-1 MG
1 TABLET ORAL DAILY
Qty: 30 EACH | Refills: 12 | Status: ON HOLD | OUTPATIENT
Start: 2018-02-27 | End: 2018-07-05

## 2018-02-27 NOTE — PROGRESS NOTES
CC: new OB visit, history obtained and  reviewed see history tabs. Patient stated that her LMP was back in July     ROS: Negative leaking fluid from the vagina, swelling in her legs, headache, visual changes, low back pain and heartburn      Educated on:Spent 30 minutes out of 45 minutes face to face counseling on nutrition, activity, diet, safety, prenatal care, medications approved in pregnancy, pregnancy discomforts, and testing.       A/Plan: f/u in ASA for anatomy   Marleny was seen today for initial prenatal visit.    Diagnoses and all orders for this visit:    Obesity affecting pregnancy, antepartum    Prenatal care of multigravida, antepartum  -     OB Panel With HIV  -     Hepatitis C Antibody  -     Urine Drug Screen - Urine, Clean Catch  -     Urine Culture - Urine, Urine, Clean Catch  -     Urinalysis - Urine, Clean Catch  -     Chlamydia trachomatis, Neisseria gonorrhoeae, Trichomonas vaginalis, PCR - Urine, Urine, Clean Catch  -     US Ob Limited 1 + Fetuses  -     RPR  -     CBC Auto Differential  -     Hepatitis B Surface Antigen  -     Rubella Antibody, IgG  -     OB Panel Type & Screen  -     HIV-1 & HIV-2 Antibodies  -     PREVIOUS HISTORY; Future  -     PREVIOUS HISTORY    Insufficient antepartum care    Other orders  -     Prenatal Vit-Fe Fumarate-FA (MYNATAL PLUS) tablet; Take 1 tablet by mouth Daily.

## 2018-02-28 ENCOUNTER — ROUTINE PRENATAL (OUTPATIENT)
Dept: OBSTETRICS AND GYNECOLOGY | Facility: CLINIC | Age: 23
End: 2018-02-28

## 2018-02-28 VITALS — DIASTOLIC BLOOD PRESSURE: 68 MMHG | WEIGHT: 228 LBS | SYSTOLIC BLOOD PRESSURE: 111 MMHG | BODY MASS INDEX: 39.14 KG/M2

## 2018-02-28 DIAGNOSIS — Z34.80 ENCOUNTER FOR SUPERVISION OF NORMAL PREGNANCY IN MULTIGRAVIDA: ICD-10-CM

## 2018-02-28 DIAGNOSIS — Z3A.22 22 WEEKS GESTATION OF PREGNANCY: Primary | ICD-10-CM

## 2018-02-28 LAB
BACTERIA SPEC AEROBE CULT: NORMAL
C TRACH RRNA CVX QL NAA+PROBE: NEGATIVE
HBV SURFACE AG SERPL QL IA: NEGATIVE
HCV AB SER DONR QL: NEGATIVE
HIV1+2 AB SER QL: NEGATIVE
N GONORRHOEA RRNA SPEC QL NAA+PROBE: NEGATIVE
RPR SER QL: NORMAL
RUBV IGG SER QL: ABNORMAL
RUBV IGG SER-ACNC: 8.9 IU/ML (ref 0–9.9)
T VAGINALIS DNA VAG QL PROBE+SIG AMP: NEGATIVE

## 2018-02-28 PROCEDURE — 99212 OFFICE O/P EST SF 10 MIN: CPT | Performed by: ADVANCED PRACTICE MIDWIFE

## 2018-03-05 PROBLEM — Z78.9 NOT IMMUNE TO RUBELLA: Status: ACTIVE | Noted: 2018-03-05

## 2018-03-27 ENCOUNTER — HOSPITAL ENCOUNTER (OUTPATIENT)
Facility: HOSPITAL | Age: 23
Discharge: HOME | End: 2018-03-27
Attending: OBSTETRICS & GYNECOLOGY | Admitting: OBSTETRICS & GYNECOLOGY

## 2018-03-27 VITALS — TEMPERATURE: 97.9 F | RESPIRATION RATE: 18 BRPM | HEART RATE: 69 BPM | OXYGEN SATURATION: 98 %

## 2018-03-27 DIAGNOSIS — Z34.82 PRENATAL CARE, SUBSEQUENT PREGNANCY IN SECOND TRIMESTER: Primary | ICD-10-CM

## 2018-03-27 LAB
BILIRUB UR QL STRIP: ABNORMAL
CLARITY UR: CLEAR
COLOR UR: ABNORMAL
GLUCOSE UR STRIP-MCNC: NEGATIVE MG/DL
HGB UR QL STRIP.AUTO: NEGATIVE
KETONES UR QL STRIP: NEGATIVE
LEUKOCYTE ESTERASE UR QL STRIP.AUTO: NEGATIVE
NITRITE UR QL STRIP: NEGATIVE
PH UR STRIP.AUTO: 6.5 [PH] (ref 5–9)
PROT UR QL STRIP: ABNORMAL
SP GR UR STRIP: 1.03 (ref 1–1.03)
UROBILINOGEN UR QL STRIP: ABNORMAL

## 2018-03-27 PROCEDURE — G0463 HOSPITAL OUTPT CLINIC VISIT: HCPCS

## 2018-03-27 PROCEDURE — 59025 FETAL NON-STRESS TEST: CPT

## 2018-03-27 PROCEDURE — 81003 URINALYSIS AUTO W/O SCOPE: CPT | Performed by: ADVANCED PRACTICE MIDWIFE

## 2018-03-28 ENCOUNTER — LAB (OUTPATIENT)
Dept: LAB | Facility: HOSPITAL | Age: 23
End: 2018-03-28

## 2018-03-28 ENCOUNTER — ROUTINE PRENATAL (OUTPATIENT)
Dept: OBSTETRICS AND GYNECOLOGY | Facility: CLINIC | Age: 23
End: 2018-03-28

## 2018-03-28 VITALS — DIASTOLIC BLOOD PRESSURE: 64 MMHG | BODY MASS INDEX: 40.51 KG/M2 | WEIGHT: 236 LBS | SYSTOLIC BLOOD PRESSURE: 117 MMHG

## 2018-03-28 DIAGNOSIS — R10.2 PELVIC PRESSURE IN PREGNANCY: ICD-10-CM

## 2018-03-28 DIAGNOSIS — R10.9 ABDOMINAL PAIN AFFECTING PREGNANCY: Primary | ICD-10-CM

## 2018-03-28 DIAGNOSIS — O09.893 SHORT INTERVAL BETWEEN PREGNANCIES AFFECTING PREGNANCY IN THIRD TRIMESTER, ANTEPARTUM: ICD-10-CM

## 2018-03-28 DIAGNOSIS — Z67.91 RH NEGATIVE STATE IN ANTEPARTUM PERIOD: ICD-10-CM

## 2018-03-28 DIAGNOSIS — O26.899 RH NEGATIVE STATE IN ANTEPARTUM PERIOD: ICD-10-CM

## 2018-03-28 DIAGNOSIS — Z34.82 PRENATAL CARE, SUBSEQUENT PREGNANCY IN SECOND TRIMESTER: ICD-10-CM

## 2018-03-28 DIAGNOSIS — O26.899 PELVIC PRESSURE IN PREGNANCY: ICD-10-CM

## 2018-03-28 DIAGNOSIS — Z3A.34 34 WEEKS GESTATION OF PREGNANCY: ICD-10-CM

## 2018-03-28 DIAGNOSIS — Z78.9 NOT IMMUNE TO RUBELLA: ICD-10-CM

## 2018-03-28 DIAGNOSIS — O26.899 ABDOMINAL PAIN AFFECTING PREGNANCY: Primary | ICD-10-CM

## 2018-03-28 LAB
DEPRECATED RDW RBC AUTO: 42.1 FL (ref 36.4–46.3)
EOSINOPHIL # BLD MANUAL: 0.28 10*3/MM3 (ref 0–0.7)
EOSINOPHIL NFR BLD MANUAL: 2 % (ref 0–7)
ERYTHROCYTE [DISTWIDTH] IN BLOOD BY AUTOMATED COUNT: 12.5 % (ref 11.5–14.5)
GLUCOSE 1H P 100 G GLC PO SERPL-MCNC: 71 MG/DL (ref 60–140)
HCT VFR BLD AUTO: 35 % (ref 35–45)
HGB BLD-MCNC: 12 G/DL (ref 12–15.5)
LYMPHOCYTES # BLD MANUAL: 3.79 10*3/MM3 (ref 0.6–4.2)
LYMPHOCYTES NFR BLD MANUAL: 2 % (ref 0–12)
LYMPHOCYTES NFR BLD MANUAL: 27 % (ref 10–50)
MCH RBC QN AUTO: 31.7 PG (ref 26.5–34)
MCHC RBC AUTO-ENTMCNC: 34.3 G/DL (ref 31.4–36)
MCV RBC AUTO: 92.3 FL (ref 80–98)
MONOCYTES # BLD AUTO: 0.28 10*3/MM3 (ref 0–0.9)
NEUTROPHILS # BLD AUTO: 9.67 10*3/MM3 (ref 2–8.6)
NEUTROPHILS NFR BLD MANUAL: 68 % (ref 37–80)
NEUTS BAND NFR BLD MANUAL: 1 % (ref 0–5)
PLAT MORPH BLD: NORMAL
PLATELET # BLD AUTO: 239 10*3/MM3 (ref 150–450)
PMV BLD AUTO: 10.2 FL (ref 8–12)
RBC # BLD AUTO: 3.79 10*6/MM3 (ref 3.77–5.16)
RBC MORPH BLD: NORMAL
WBC MORPH BLD: NORMAL
WBC NRBC COR # BLD: 14.02 10*3/MM3 (ref 3.2–9.8)

## 2018-03-28 PROCEDURE — 36415 COLL VENOUS BLD VENIPUNCTURE: CPT

## 2018-03-28 PROCEDURE — 85027 COMPLETE CBC AUTOMATED: CPT

## 2018-03-28 PROCEDURE — 99212 OFFICE O/P EST SF 10 MIN: CPT | Performed by: ADVANCED PRACTICE MIDWIFE

## 2018-03-28 PROCEDURE — 85007 BL SMEAR W/DIFF WBC COUNT: CPT

## 2018-03-28 PROCEDURE — 82950 GLUCOSE TEST: CPT

## 2018-03-28 NOTE — PROGRESS NOTES
CC: DUYEN visit, history reviewed     ROS:Positive Pelvic pressure, lower abdominal pain, round ligament pain   Negative leaking fluid from the vagina, swelling in her legs, headache and visual changes  Objective: fitted with maternity belt, patient states the support helps with her complaints  1 hour GTT    Educated on: Use of maternity belt, comfort measures for abdominal pain, pelvic pressure & round ligament pain    A/Plan: f/u in 2 week/s   Marleny was seen today for routine prenatal visit.    Diagnoses and all orders for this visit:    Abdominal pain affecting pregnancy    34 weeks gestation of pregnancy    Rh negative state in antepartum period    Not immune to rubella    Short interval between pregnancies affecting pregnancy in third trimester, antepartum    Pelvic pressure in pregnancy

## 2018-04-11 ENCOUNTER — ROUTINE PRENATAL (OUTPATIENT)
Dept: OBSTETRICS AND GYNECOLOGY | Facility: CLINIC | Age: 23
End: 2018-04-11

## 2018-04-11 ENCOUNTER — APPOINTMENT (OUTPATIENT)
Dept: LAB | Facility: HOSPITAL | Age: 23
End: 2018-04-11

## 2018-04-11 VITALS — BODY MASS INDEX: 41.37 KG/M2 | WEIGHT: 241 LBS | DIASTOLIC BLOOD PRESSURE: 53 MMHG | SYSTOLIC BLOOD PRESSURE: 112 MMHG

## 2018-04-11 DIAGNOSIS — N94.9 ROUND LIGAMENT PAIN: ICD-10-CM

## 2018-04-11 DIAGNOSIS — Z78.9 NOT IMMUNE TO RUBELLA: ICD-10-CM

## 2018-04-11 DIAGNOSIS — O26.899 RH NEGATIVE STATE IN ANTEPARTUM PERIOD: ICD-10-CM

## 2018-04-11 DIAGNOSIS — Z3A.28 28 WEEKS GESTATION OF PREGNANCY: ICD-10-CM

## 2018-04-11 DIAGNOSIS — Z34.82 PRENATAL CARE, SUBSEQUENT PREGNANCY IN SECOND TRIMESTER: ICD-10-CM

## 2018-04-11 DIAGNOSIS — Z67.91 RH NEGATIVE STATE IN ANTEPARTUM PERIOD: ICD-10-CM

## 2018-04-11 DIAGNOSIS — R10.2 PELVIC PRESSURE IN PREGNANCY, ANTEPARTUM, THIRD TRIMESTER: ICD-10-CM

## 2018-04-11 DIAGNOSIS — O26.843 UTERINE SIZE DATE DISCREPANCY PREGNANCY, THIRD TRIMESTER: Primary | ICD-10-CM

## 2018-04-11 DIAGNOSIS — O26.893 PELVIC PRESSURE IN PREGNANCY, ANTEPARTUM, THIRD TRIMESTER: ICD-10-CM

## 2018-04-11 LAB — BLD GP AB SCN SERPL QL: NEGATIVE

## 2018-04-11 PROCEDURE — 99212 OFFICE O/P EST SF 10 MIN: CPT | Performed by: ADVANCED PRACTICE MIDWIFE

## 2018-04-11 PROCEDURE — 86850 RBC ANTIBODY SCREEN: CPT | Performed by: ADVANCED PRACTICE MIDWIFE

## 2018-04-11 PROCEDURE — 96372 THER/PROPH/DIAG INJ SC/IM: CPT | Performed by: ADVANCED PRACTICE MIDWIFE

## 2018-04-11 PROCEDURE — 36415 COLL VENOUS BLD VENIPUNCTURE: CPT | Performed by: ADVANCED PRACTICE MIDWIFE

## 2018-04-11 NOTE — PROGRESS NOTES
CC: DUYEN visit, history reviewed, changes noted    ROS:Positive pelvic pressure, round ligament pain, mild edema in right leg, none noted today   Negative leaking fluid from the vagina, swelling in her legs, headache and visual changes      Educated on:Need to wear maternity belt reviewed normal labs, [facility]  Objective: Rhogam    A/Plan: f/u in 2 week/s   Marleny was seen today for routine prenatal visit.    Diagnoses and all orders for this visit:    Uterine size date discrepancy pregnancy, third trimester    28 weeks gestation of pregnancy    Rh negative state in antepartum period    Not immune to rubella    Prenatal care, subsequent pregnancy in second trimester  -     Antibody Screen    Round ligament pain    Pelvic pressure in pregnancy, antepartum, third trimester

## 2018-04-26 ENCOUNTER — ROUTINE PRENATAL (OUTPATIENT)
Dept: OBSTETRICS AND GYNECOLOGY | Facility: CLINIC | Age: 23
End: 2018-04-26

## 2018-04-26 VITALS — WEIGHT: 241 LBS | BODY MASS INDEX: 41.37 KG/M2 | DIASTOLIC BLOOD PRESSURE: 59 MMHG | SYSTOLIC BLOOD PRESSURE: 106 MMHG

## 2018-04-26 DIAGNOSIS — O26.843 UTERINE SIZE DATE DISCREPANCY PREGNANCY, THIRD TRIMESTER: ICD-10-CM

## 2018-04-26 DIAGNOSIS — Z3A.30 30 WEEKS GESTATION OF PREGNANCY: Primary | ICD-10-CM

## 2018-04-26 DIAGNOSIS — R10.13 EPIGASTRIC PAIN: ICD-10-CM

## 2018-04-26 PROCEDURE — 99212 OFFICE O/P EST SF 10 MIN: CPT | Performed by: ADVANCED PRACTICE MIDWIFE

## 2018-04-26 NOTE — PROGRESS NOTES
CC: DUYEN visit, history reviewed, changes     ROS:Positive epigastric pain, mostly right sided   Negative leaking fluid from the vagina, swelling in her legs, headache, visual changes, low back pain, denies N&V, visual disturbances,     Objective: abdomen palpated, no masses noted, tenderness not increase with palpation     Educated on:Preeclampsia warning signs, FKC, VB    A/Plan: f/u in 2 week/s   Marleny was seen today for routine prenatal visit.    Diagnoses and all orders for this visit:    30 weeks gestation of pregnancy    Uterine size date discrepancy pregnancy, third trimester  -     US Ob Follow Up Transabdominal Approach; Future

## 2018-05-11 ENCOUNTER — ROUTINE PRENATAL (OUTPATIENT)
Dept: OBSTETRICS AND GYNECOLOGY | Facility: CLINIC | Age: 23
End: 2018-05-11

## 2018-05-11 VITALS — SYSTOLIC BLOOD PRESSURE: 116 MMHG | WEIGHT: 244 LBS | DIASTOLIC BLOOD PRESSURE: 70 MMHG | BODY MASS INDEX: 41.88 KG/M2

## 2018-05-11 DIAGNOSIS — Z3A.32 32 WEEKS GESTATION OF PREGNANCY: ICD-10-CM

## 2018-05-11 DIAGNOSIS — O99.213 OBESITY AFFECTING PREGNANCY IN THIRD TRIMESTER: Primary | ICD-10-CM

## 2018-05-11 PROCEDURE — 99213 OFFICE O/P EST LOW 20 MIN: CPT | Performed by: OBSTETRICS & GYNECOLOGY

## 2018-05-11 NOTE — PROGRESS NOTES
HISTORY OF PRESENT ILLNESS: The patient presents for a return OB visit today. She underwent an ultrasound secondary to size greater than dates at her last visit. Her fetus is at the 31 percentile for growth with an estimated fetal weight of 4 pounds. She notes good fetal movement, no rupture of membranes and no bleeding. She is eating normally, having normal bowel movements and urinating normally.     Her interval past medical, family and social history is unchanged since her visit of 2018.    REVIEW OF SYSTEMS: Negative.    PHYSICAL EXAMINATION:  GENERAL:  A well-developed, well-nourished gravid female in no acute distress.  VITAL SIGNS: Blood pressure 166/70, weight is 244 pounds, height is 163 cm.  ABDOMEN: Soft, nontender, and gravid. Fundal height, today I only get 33 cm. Fetal heart tones by ultrasound were 128 beats per minute.    DIAGNOSTIC DATA: BP today was 8 out of 8.    ASSESSMENT:  1.  A 22-year-old  2 para 1-0-0-1 at 32-4/7 weeks with an EDC of 2018.  2.  Obesity complicating pregnancy.    PLAN: Follow up in 2 weeks with the midwives.                 This document has been electronically signed by Sloan Bautista MD on May 11, 2018 11:08 AM

## 2018-05-21 ENCOUNTER — HOSPITAL ENCOUNTER (OUTPATIENT)
Facility: HOSPITAL | Age: 23
Discharge: HOME | End: 2018-05-21
Attending: OBSTETRICS & GYNECOLOGY | Admitting: OBSTETRICS & GYNECOLOGY

## 2018-05-21 VITALS
OXYGEN SATURATION: 98 % | HEART RATE: 67 BPM | DIASTOLIC BLOOD PRESSURE: 59 MMHG | SYSTOLIC BLOOD PRESSURE: 118 MMHG | TEMPERATURE: 98.1 F

## 2018-05-21 LAB
BACTERIA UR QL AUTO: NORMAL /HPF
BILIRUB UR QL STRIP: NEGATIVE
CANDIDA ALBICANS: NEGATIVE
CLARITY UR: CLEAR
COLOR UR: YELLOW
GARDNERELLA VAGINALIS: NEGATIVE
GLUCOSE UR STRIP-MCNC: NEGATIVE MG/DL
HGB UR QL STRIP.AUTO: NEGATIVE
HYALINE CASTS UR QL AUTO: NORMAL /LPF
KETONES UR QL STRIP: NEGATIVE
LEUKOCYTE ESTERASE UR QL STRIP.AUTO: ABNORMAL
NITRITE UR QL STRIP: NEGATIVE
PH UR STRIP.AUTO: 6 [PH] (ref 5–9)
PROT UR QL STRIP: NEGATIVE
RBC # UR: NORMAL /HPF
REF LAB TEST METHOD: NORMAL
SP GR UR STRIP: 1.01 (ref 1–1.03)
SQUAMOUS #/AREA URNS HPF: NORMAL /HPF
TRICHOMONAS VAGINALIS PCR: NEGATIVE
UROBILINOGEN UR QL STRIP: ABNORMAL
WBC UR QL AUTO: NORMAL /HPF

## 2018-05-21 PROCEDURE — G0463 HOSPITAL OUTPT CLINIC VISIT: HCPCS

## 2018-05-21 PROCEDURE — 96372 THER/PROPH/DIAG INJ SC/IM: CPT

## 2018-05-21 PROCEDURE — 87660 TRICHOMONAS VAGIN DIR PROBE: CPT | Performed by: ADVANCED PRACTICE MIDWIFE

## 2018-05-21 PROCEDURE — 81001 URINALYSIS AUTO W/SCOPE: CPT | Performed by: ADVANCED PRACTICE MIDWIFE

## 2018-05-21 PROCEDURE — 25010000002 TERBUTALINE PER 1 MG

## 2018-05-21 PROCEDURE — 87480 CANDIDA DNA DIR PROBE: CPT | Performed by: ADVANCED PRACTICE MIDWIFE

## 2018-05-21 PROCEDURE — 59025 FETAL NON-STRESS TEST: CPT

## 2018-05-21 PROCEDURE — 87510 GARDNER VAG DNA DIR PROBE: CPT | Performed by: ADVANCED PRACTICE MIDWIFE

## 2018-05-21 RX ORDER — TERBUTALINE SULFATE 1 MG/ML
0.25 INJECTION, SOLUTION SUBCUTANEOUS ONCE
Status: COMPLETED | OUTPATIENT
Start: 2018-05-21 | End: 2018-05-21

## 2018-05-21 RX ORDER — TERBUTALINE SULFATE 1 MG/ML
INJECTION, SOLUTION SUBCUTANEOUS
Status: COMPLETED
Start: 2018-05-21 | End: 2018-05-21

## 2018-05-21 RX ADMIN — TERBUTALINE SULFATE 0.25 MG: 1 INJECTION SUBCUTANEOUS at 21:00

## 2018-05-21 RX ADMIN — TERBUTALINE SULFATE 0.25 MG: 1 INJECTION, SOLUTION SUBCUTANEOUS at 21:00

## 2018-05-22 NOTE — DISCHARGE INSTRUCTIONS
Return for vaginal bleeding, leaking of fluid, decreased fetal movement, or contractions that are regular in consistency and painful.  Keep next scheduled appointment.

## 2018-05-22 NOTE — PROGRESS NOTES
[facility]  Marleny Conn  : 1995  MRN: [id number]  CSN: [id number]    L&D Triage note    Subjective    Min/max vitals past 24 hours:  Temp  Min: 98.1 °F (36.7 °C)  Max: 98.1 °F (36.7 °C)   BP  Min: 118/59  Max: 118/59   Pulse  Min: 69  Max: 69   No Data Recorded    Lab Results (most recent)     Procedure Component Value Units Date/Time    Urinalysis, Microscopic Only - Urine, Clean Catch [[id number]] Collected:  18    Specimen:  Urine from Urine, Clean Catch Updated:  18     RBC, UA None Seen /HPF      WBC, UA 0-2 /HPF      Bacteria, UA None Seen /HPF      Squamous Epithelial Cells, UA None Seen /HPF      Hyaline Casts, UA 0-2 /LPF      Methodology Automated Microscopy    Urinalysis With / Microscopic If Indicated - Urine, Clean Catch [[id number]]  (Abnormal) Collected:  18    Specimen:  Urine from Urine, Clean Catch Updated:  18     Color, UA Yellow     Appearance, UA Clear     pH, UA 6.0     Specific Gravity, UA 1.014     Glucose, UA Negative     Ketones, UA Negative     Bilirubin, UA Negative     Blood, UA Negative     Protein, UA Negative     Leuk Esterase, UA Trace (A)     Nitrite, UA Negative     Urobilinogen, UA 0.2 E.U./dL    Gardnerella vaginalis, Trichomonas vaginalis, Candida albicans, PCR - Swab, Vagina [[id number]] Collected:  18    Specimen:  Swab from Vagina Updated:  18                Assessment  1. Regular contractions every 3 minutes  2. Reactive NST  3. + FM  4. SVE closed/ 30%/-3    Plan  1. Continue to monitor      This document has been electronically signed by PETTY Eduardo on May 21, 2018 8:29 PM

## 2018-05-22 NOTE — NON STRESS TEST
Marleny Disha Conn, a  at 34w0d with an LAMBERTO of 2018, by Ultrasound, was seen at [facility] for a nonstress test.    Chief Complaint   Patient presents with   • Back Pain     Back pain started yesterday and eased up today.  Denies rupture of membranes, vaginal bleeding, or decreased fetal movement.         Interpretation A  Nonstress Test Interpretation A: Reactive (18 : Alisia Carter RN)  Comments A: Reviewed with RONNIE Denson RN (18 : Alisia Carter RN)

## 2018-05-30 ENCOUNTER — ROUTINE PRENATAL (OUTPATIENT)
Dept: OBSTETRICS AND GYNECOLOGY | Facility: CLINIC | Age: 23
End: 2018-05-30

## 2018-05-30 VITALS — WEIGHT: 246 LBS | SYSTOLIC BLOOD PRESSURE: 107 MMHG | BODY MASS INDEX: 42.23 KG/M2 | DIASTOLIC BLOOD PRESSURE: 64 MMHG

## 2018-05-30 DIAGNOSIS — O99.891 BACK PAIN AFFECTING PREGNANCY IN THIRD TRIMESTER: ICD-10-CM

## 2018-05-30 DIAGNOSIS — Z3A.35 35 WEEKS GESTATION OF PREGNANCY: ICD-10-CM

## 2018-05-30 DIAGNOSIS — M54.9 BACK PAIN AFFECTING PREGNANCY IN THIRD TRIMESTER: ICD-10-CM

## 2018-05-30 DIAGNOSIS — Z36.85 ANTENATAL SCREENING FOR STREPTOCOCCUS B: Primary | ICD-10-CM

## 2018-05-30 PROCEDURE — 99212 OFFICE O/P EST SF 10 MIN: CPT | Performed by: ADVANCED PRACTICE MIDWIFE

## 2018-05-30 PROCEDURE — 87653 STREP B DNA AMP PROBE: CPT | Performed by: ADVANCED PRACTICE MIDWIFE

## 2018-05-31 LAB — GROUP B STREP, DNA: POSITIVE

## 2018-06-03 NOTE — PROGRESS NOTES
CC: DUYEN visit, history reviewed, changes noted    ROS:Positive Back pain unrelieved with back brace & other comfort measures   Negative leaking fluid from the vagina, swelling in her legs, headache, visual changes and heartburn    Objective: Refer to PT    Educated on:FKC, VB, labor signs, preeclampsia warning signs    A/Plan: f/u in 1 week/s   Marleny was seen today for routine prenatal visit.    Diagnoses and all orders for this visit:     screening for streptococcus B  -     Group B Strep (Molecular) - Swab, Vagina    35 weeks gestation of pregnancy    Back pain affecting pregnancy in third trimester  -     Ambulatory Referral to Physical Therapy Evaluate and treat

## 2018-06-06 ENCOUNTER — HOSPITAL ENCOUNTER (OUTPATIENT)
Facility: HOSPITAL | Age: 23
Discharge: HOME | End: 2018-06-06
Attending: OBSTETRICS & GYNECOLOGY | Admitting: OBSTETRICS & GYNECOLOGY

## 2018-06-06 VITALS — OXYGEN SATURATION: 98 % | HEART RATE: 93 BPM | SYSTOLIC BLOOD PRESSURE: 124 MMHG | DIASTOLIC BLOOD PRESSURE: 65 MMHG

## 2018-06-06 PROCEDURE — G0463 HOSPITAL OUTPT CLINIC VISIT: HCPCS

## 2018-06-06 PROCEDURE — 59025 FETAL NON-STRESS TEST: CPT

## 2018-06-06 NOTE — NON STRESS TEST
Marleny Conn, a  at 36w2d with an LAMBERTO of 2018, by Ultrasound, was seen at [facility] for a nonstress test.    Chief Complaint   Patient presents with   • Contractions     pt stattes that uc's started last night then stopped then started back more often this morning cont. throughout the day, she denies vaginal bleeding, or leaking of fluid, she reports positive movement       Interpretation A  Nonstress Test Interpretation A: Reactive (18 : Soumya Mills, RN)  Comments A: Reviewed with TRINI Lyons RN  (18 : Soumya Mills RN)      SVE unchanged after 1 hour of monitoring. 1 UC picked up with TOCO. Pt reports mild cramping at this time. Ready for d/c home. Reviewed s/s of labor. Keep next scheduled appt.

## 2018-06-06 NOTE — DISCHARGE INSTR - ACTIVITY
Please return for strong/painful contractions that are 2 to 3 minutes apart, vaginal bleeding, decreased fetal movement, if your water breaks/fluid is leaking. Call with any questions or concerns. Keep your next scheduled appt. Drink 6 to 8 glasses of water. Comfort measures: tylenol, warm bath, rest.

## 2018-06-08 ENCOUNTER — ROUTINE PRENATAL (OUTPATIENT)
Dept: OBSTETRICS AND GYNECOLOGY | Facility: CLINIC | Age: 23
End: 2018-06-08

## 2018-06-08 VITALS — WEIGHT: 248 LBS | DIASTOLIC BLOOD PRESSURE: 67 MMHG | SYSTOLIC BLOOD PRESSURE: 112 MMHG | BODY MASS INDEX: 42.57 KG/M2

## 2018-06-08 DIAGNOSIS — Z78.9 NOT IMMUNE TO RUBELLA: ICD-10-CM

## 2018-06-08 DIAGNOSIS — Z3A.36 36 WEEKS GESTATION OF PREGNANCY: Primary | ICD-10-CM

## 2018-06-08 DIAGNOSIS — M54.9 BACK PAIN AFFECTING PREGNANCY IN THIRD TRIMESTER: ICD-10-CM

## 2018-06-08 DIAGNOSIS — Z67.91 RH NEGATIVE STATE IN ANTEPARTUM PERIOD: ICD-10-CM

## 2018-06-08 DIAGNOSIS — O99.891 BACK PAIN AFFECTING PREGNANCY IN THIRD TRIMESTER: ICD-10-CM

## 2018-06-08 DIAGNOSIS — O26.899 RH NEGATIVE STATE IN ANTEPARTUM PERIOD: ICD-10-CM

## 2018-06-08 PROCEDURE — 99212 OFFICE O/P EST SF 10 MIN: CPT | Performed by: ADVANCED PRACTICE MIDWIFE

## 2018-06-11 NOTE — PROGRESS NOTES
CC: DUYEN visit, history reviewed, changes noted    ROS:Positive swelling in her legs and low back pain   Negative leaking fluid from the vagina, headache, visual changes and heartburn      Educated on:FKC, labor signs, VB, comfort measures for back pain    A/Plan: f/u in 1 week/s   Marleny was seen today for routine prenatal visit.    Diagnoses and all orders for this visit:    36 weeks gestation of pregnancy    Rh negative state in antepartum period    Not immune to rubella    Back pain affecting pregnancy in third trimester

## 2018-06-12 ENCOUNTER — ROUTINE PRENATAL (OUTPATIENT)
Dept: OBSTETRICS AND GYNECOLOGY | Facility: CLINIC | Age: 23
End: 2018-06-12

## 2018-06-12 ENCOUNTER — HOSPITAL ENCOUNTER (OUTPATIENT)
Dept: PHYSICAL THERAPY | Facility: HOSPITAL | Age: 23
Setting detail: THERAPIES SERIES
Discharge: HOME | End: 2018-06-12

## 2018-06-12 VITALS — WEIGHT: 251 LBS | BODY MASS INDEX: 43.08 KG/M2 | DIASTOLIC BLOOD PRESSURE: 63 MMHG | SYSTOLIC BLOOD PRESSURE: 101 MMHG

## 2018-06-12 DIAGNOSIS — Z3A.37 37 WEEKS GESTATION OF PREGNANCY: Primary | ICD-10-CM

## 2018-06-12 DIAGNOSIS — O99.891 BACK PAIN AFFECTING PREGNANCY IN THIRD TRIMESTER: ICD-10-CM

## 2018-06-12 DIAGNOSIS — M54.9 BACK PAIN AFFECTING PREGNANCY IN THIRD TRIMESTER: Primary | ICD-10-CM

## 2018-06-12 DIAGNOSIS — M54.9 BACK PAIN AFFECTING PREGNANCY IN THIRD TRIMESTER: ICD-10-CM

## 2018-06-12 DIAGNOSIS — O99.891 BACK PAIN AFFECTING PREGNANCY IN THIRD TRIMESTER: Primary | ICD-10-CM

## 2018-06-12 PROCEDURE — 99212 OFFICE O/P EST SF 10 MIN: CPT | Performed by: ADVANCED PRACTICE MIDWIFE

## 2018-06-12 PROCEDURE — 97162 PT EVAL MOD COMPLEX 30 MIN: CPT | Performed by: PHYSICAL THERAPIST

## 2018-06-12 NOTE — PROGRESS NOTES
CC: DUYEN visit, history reviewed, no changes noted    ROS:Positive low back pain   Negative leaking fluid from the vagina, swelling in her legs, headache, visual changes and heartburn    Patient requested to sign tubal papers. Counseled regarding this is a permanent sterilization. Patient is undecided now & thinking about nexplanon    Educated on:FKC, labor signs, VB    A/Plan: f/u in 1 week/s   Marleny was seen today for routine prenatal visit.    Diagnoses and all orders for this visit:    37 weeks gestation of pregnancy    Back pain affecting pregnancy in third trimester
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
## 2018-06-13 ENCOUNTER — HOSPITAL ENCOUNTER (OUTPATIENT)
Facility: HOSPITAL | Age: 23
Discharge: HOME | End: 2018-06-13
Attending: OBSTETRICS & GYNECOLOGY | Admitting: OBSTETRICS & GYNECOLOGY

## 2018-06-13 VITALS
TEMPERATURE: 98.6 F | WEIGHT: 251 LBS | OXYGEN SATURATION: 97 % | RESPIRATION RATE: 18 BRPM | HEART RATE: 106 BPM | SYSTOLIC BLOOD PRESSURE: 120 MMHG | BODY MASS INDEX: 42.85 KG/M2 | HEIGHT: 64 IN | DIASTOLIC BLOOD PRESSURE: 74 MMHG

## 2018-06-13 LAB
BACTERIA UR QL AUTO: ABNORMAL /HPF
BILIRUB UR QL STRIP: ABNORMAL
CLARITY UR: CLEAR
COLOR UR: YELLOW
GLUCOSE UR STRIP-MCNC: NEGATIVE MG/DL
HGB UR QL STRIP.AUTO: NEGATIVE
HYALINE CASTS UR QL AUTO: ABNORMAL /LPF
KETONES UR QL STRIP: NEGATIVE
LEUKOCYTE ESTERASE UR QL STRIP.AUTO: NEGATIVE
NITRITE UR QL STRIP: NEGATIVE
PH UR STRIP.AUTO: 5.5 [PH] (ref 5–9)
PROT UR QL STRIP: ABNORMAL
RBC # UR: ABNORMAL /HPF
REF LAB TEST METHOD: ABNORMAL
SP GR UR STRIP: 1.03 (ref 1–1.03)
SQUAMOUS #/AREA URNS HPF: ABNORMAL /HPF
UROBILINOGEN UR QL STRIP: ABNORMAL
WBC UR QL AUTO: ABNORMAL /HPF

## 2018-06-13 PROCEDURE — G0463 HOSPITAL OUTPT CLINIC VISIT: HCPCS

## 2018-06-13 PROCEDURE — 59025 FETAL NON-STRESS TEST: CPT

## 2018-06-13 PROCEDURE — 81001 URINALYSIS AUTO W/SCOPE: CPT | Performed by: OBSTETRICS & GYNECOLOGY

## 2018-06-13 RX ORDER — SODIUM CHLORIDE, SODIUM LACTATE, POTASSIUM CHLORIDE, CALCIUM CHLORIDE 600; 310; 30; 20 MG/100ML; MG/100ML; MG/100ML; MG/100ML
INJECTION, SOLUTION INTRAVENOUS
Status: COMPLETED
Start: 2018-06-13 | End: 2018-06-13

## 2018-06-13 RX ADMIN — SODIUM CHLORIDE, SODIUM LACTATE, POTASSIUM CHLORIDE, AND CALCIUM CHLORIDE 1000 ML: 600; 310; 30; 20 INJECTION, SOLUTION INTRAVENOUS at 21:29

## 2018-06-14 NOTE — NON STRESS TEST
Marleny Disha Conn, a  at 37w2d with an LAMBERTO of 2018, by Ultrasound, was seen at [facility] for a nonstress test.    Chief Complaint   Patient presents with   • Back Pain     Pt c/o pain in back that has lasted all day.  Pain is now radiating to lower abdomen.  Denies vaginal bleeding or rupture of membranes.  Reports decreased fetal movement.   • Decreased Fetal Movement            NST reactive. Reviewed with LULU Carter RN

## 2018-06-14 NOTE — DISCHARGE INSTR - ACTIVITY
Come back if you have bleeding, leakage of fluids, or your contractions get stronger/ more frequent. Keep current follow-up appointment.

## 2018-06-20 ENCOUNTER — APPOINTMENT (OUTPATIENT)
Dept: PHYSICAL THERAPY | Facility: HOSPITAL | Age: 23
End: 2018-06-20

## 2018-06-21 ENCOUNTER — ROUTINE PRENATAL (OUTPATIENT)
Dept: OBSTETRICS AND GYNECOLOGY | Facility: CLINIC | Age: 23
End: 2018-06-21

## 2018-06-21 ENCOUNTER — HOSPITAL ENCOUNTER (OUTPATIENT)
Dept: PHYSICAL THERAPY | Facility: HOSPITAL | Age: 23
Setting detail: THERAPIES SERIES
Discharge: HOME | End: 2018-06-21

## 2018-06-21 VITALS — DIASTOLIC BLOOD PRESSURE: 72 MMHG | WEIGHT: 253 LBS | BODY MASS INDEX: 43.43 KG/M2 | SYSTOLIC BLOOD PRESSURE: 118 MMHG

## 2018-06-21 DIAGNOSIS — O99.891 BACK PAIN AFFECTING PREGNANCY IN THIRD TRIMESTER: ICD-10-CM

## 2018-06-21 DIAGNOSIS — Z78.9 NOT IMMUNE TO RUBELLA: ICD-10-CM

## 2018-06-21 DIAGNOSIS — M54.9 BACK PAIN AFFECTING PREGNANCY IN THIRD TRIMESTER: ICD-10-CM

## 2018-06-21 DIAGNOSIS — O26.899 RH NEGATIVE STATE IN ANTEPARTUM PERIOD: ICD-10-CM

## 2018-06-21 DIAGNOSIS — Z3A.38 38 WEEKS GESTATION OF PREGNANCY: Primary | ICD-10-CM

## 2018-06-21 DIAGNOSIS — Z67.91 RH NEGATIVE STATE IN ANTEPARTUM PERIOD: ICD-10-CM

## 2018-06-21 DIAGNOSIS — O99.891 BACK PAIN AFFECTING PREGNANCY IN THIRD TRIMESTER: Primary | ICD-10-CM

## 2018-06-21 DIAGNOSIS — M54.9 BACK PAIN AFFECTING PREGNANCY IN THIRD TRIMESTER: Primary | ICD-10-CM

## 2018-06-21 PROCEDURE — 97140 MANUAL THERAPY 1/> REGIONS: CPT | Performed by: PHYSICAL THERAPIST

## 2018-06-21 PROCEDURE — 97110 THERAPEUTIC EXERCISES: CPT | Performed by: PHYSICAL THERAPIST

## 2018-06-21 PROCEDURE — 99212 OFFICE O/P EST SF 10 MIN: CPT | Performed by: ADVANCED PRACTICE MIDWIFE

## 2018-06-21 NOTE — THERAPY TREATMENT NOTE
Outpatient Physical Therapy Women's Health Treatment Note  [facility]     Patient Name: Marleny Conn  : 1995  MRN: [id number]  Today's Date: 2018        Visit Date: 2018  Visit Number:   Recheck: 18  Insurance: 8 visits (8/15/18)  % improvement: 20%    Visit Dx:    ICD-10-CM ICD-9-CM   1. Back pain affecting pregnancy in third trimester O26.893 646.83    M54.9 724.5       Patient Active Problem List   Diagnosis   • Rh negative state in antepartum period   • Not immune to rubella              Women's Health     Row Name 18 1200             Subjective Comments    Subjective Comments Just saw MD.  Everything good.  Babies head is turned a little.  3 cm dilated and cervix is soft.  Was seen in L&D last week due to inc contractions.  Past two days back has really been hurting more.  Decreased sleep due to pain .   -SW         Pregnancy Questions    Number of Pregnancies 2  -SW      Number of Children 1  -SW      How many weeks pregnant are you? 38 weeks  -SW      Due Date 18  -SW        User Key  (r) = Recorded By, (t) = Taken By, (c) = Cosigned By    Initials Name Provider Type    SW Eun Hartmann, PT Physical Therapist              PT Ortho     Row Name 18 1200       Subjective Pain    Able to rate subjective pain? yes  -SW    Pre-Treatment Pain Level 7  -SW    Post-Treatment Pain Level 2  -SW       Posture/Observations    Posture- WNL Posture is WNL  -SW    Alignment Options Forward head;Thoracic kyphosis;Rounded shoulders;Scapular elevation  -SW    Forward Head Mild  -SW    Thoracic Kyphosis Moderate  -SW    Rounded Shoulders Moderate  -SW    Scapular Elevation Left:;Elevated;Standing posture  -SW    Posture/Observations Comments Gait is with fluid movements.  Equal step and stride length noted bilaterally.  No evidence of toe drop noted.   -SW       Quarter Clearing    Quarter Clearing Lower Quarter Clearing  -SW       DTR- Lower Quarter Clearing     Patellar tendon (L2-4) 2- Normal response  -SW    Achilles tendon (S1-2) 2- Normal response  -SW       Neural Tension Signs- Lower Quarter Clearing    Slump Negative  -SW    SLR Left:;Positive  -SW       Sensory Screen for Light Touch- Lower Quarter Clearing    L1 (inguinal area) Intact  -SW    L2 (anterior mid thigh) Intact  -SW    L3 (distal anterior thigh) Intact  -SW    L4 (medial lower leg/foot) Intact  -SW    L5 (lateral lower leg/great toe) Intact  -SW    S1 (bottom of foot) Intact  -SW       Myotomal Screen- Lower Quarter Clearing    Hip flexion (L2) 5 (Normal)  -SW    Knee extension (L3) 5 (Normal)  -SW    Ankle DF (L4) 5 (Normal)  -SW    Great toe extension (L5) WNL  -SW    Ankle PF (S1) 5 (Normal)  -SW    Knee flexion (S2) 5 (Normal)  -SW       Lumbar ROM Screen- Lower Quarter Clearing    Lumbar Flexion Impaired   pain at end range; 50% reduction to mid thigh only  -SW    Lumbar Extension Normal  -SW    Lumbar Lateral Flexion --   L SB inc pain but equal bilaterally   -SW    Lumbar Rotation Normal  -SW       SI/Hip Screen- Lower Quarter Clearing    ASIS compression Bilateral:;Positive  -SW    ASIS distraction Negative   improved s/s   -SW    [place]'s/Edgar's test Bilateral:;Positive  -SW       Special Tests/Palpation    Special Tests/Palpation Lumbar/SI  -SW       Lumbosacral Palpation    SI --   level and symmetrical bilateral; reduced AP glide  -SW    Lumbosacral Segment Bilateral:;Guarded/taut  -SW    Piriformis Left:;Guarded/taut;Elicits spasm;Trigger point  -SW    Erector Spinae (Paraspinals) Bilateral:;Guarded/taut   R>L  -SW    Hamstring Bilateral:;Guarded/taut  -SW    Iliopsoas Bilateral:;Guarded/taut  -SW    Lumbosacral Palpation? Yes  -SW       Lumbosacral Accessory Motions    Lumbosacral Accessory Motions Tested? Yes  -SW    PA Glide- L1 WNL  -SW    PA [place]- L2 WNL  -SW    PA [place]- L3 WNL  -SW    PA [place]- L4 WNL  -SW    PA Glide- L5 Hypomobile  -SW    PA glide- Sacral base --   reduced AP  glide; level with pain on R>L  -SW    Innominate rotation --   ant R innom  -SW       Flexibility    Flexibility Tested? Lower Extremity  -      User Key  (r) = Recorded By, (t) = Taken By, (c) = Cosigned By    Initials Name Provider Type    WELLINGTON Hartmann, PT Physical Therapist                           PT Assessment/Plan     Row Name 06/21/18 1200          PT Assessment    Functional Limitations Performance in work activities;Performance in self-care ADL;Performance in leisure activities;Limitations in community activities;Limitation in home management  -     Impairments Impaired muscle length;Impaired muscle power;Muscle strength;Pain;Poor body mechanics;Joint mobility;Posture  -SW     Assessment Comments Patient's alignment easily corrected with MET.  Pain significantly reduced post mobilization and fascial release to LB/sacral region.  Most of pain centered around R sacral region.  Compliant with recommendations and exercise.  Educated on hydration importance as to dec muscular spasm and uterine contractions.    -     Rehab Potential Good  -     Patient/caregiver participated in establishment of treatment plan and goals Yes  -SW     Patient would benefit from skilled therapy intervention Yes  -SW        PT Plan    PT Frequency 1x/week  -     PT Plan Comments Manual therapy for alignment as needed.  MFR as appropriate.  Teach mod mid back stretch next visit.   -       User Key  (r) = Recorded By, (t) = Taken By, (c) = Cosigned By    Initials Name Provider Type    WELLINGTON Hartmann PT Physical Therapist                      Exercises     Row Name 06/21/18 1300 06/21/18 1200          Subjective Comments    Subjective Comments  -- Just saw MD.  Everything good.  Babies head is turned a little.  3 cm dilated and cervix is soft.  Was seen in L&D last week due to inc contractions.  Past two days back has really been hurting more.  Decreased sleep due to pain .   -        Subjective Pain     Able to rate subjective pain?  -- yes  -SW     Pre-Treatment Pain Level  -- 7  -SW     Post-Treatment Pain Level  -- 2  -SW        Total Minutes    68253 - PT Therapeutic Exercise Minutes  -- 15  -SW     91733 - PT Manual Therapy Minutes 43  -SW  --        Exercise 1    Exercise Name 1  -- Hamstring stretch seated.   -SW     Reps 1  -- 3  -SW     Time 1  -- 30  -SW        Exercise 2    Exercise Name 2  -- Adductor stretch   -SW     Reps 2  -- 3  -SW     Time 2  -- 30  -SW        Exercise 3    Exercise Name 3  -- piriformis stretch bilaterally standing   -SW     Reps 3  -- 3  -SW     Time 3  -- 30  -SW        Exercise 4    Exercise Name 4  -- angry cat stretch   -SW     Reps 4  -- 10  -SW     Time 4  -- 5  -SW       User Key  (r) = Recorded By, (t) = Taken By, (c) = Cosigned By    Initials Name Provider Type    WELLINGTON Hartmann, PT Physical Therapist           Manual Rx (last 36 hours)      Manual Treatments     Row Name 06/21/18 1300             Total Minutes    68459 - PT Manual Therapy Minutes 43  -SW         Manual Rx 1    Manual Rx 1 Location Ant rotation R innom  -SW      Manual Rx 1 Type MET  -SW      Manual Rx 1 Duration 5  -SW         Manual Rx 2    Manual Rx 2 Location Lumbosacral release  -SW      Manual Rx 2 Type MFR/cupping  -SW         Manual Rx 3    Manual Rx 3 Location sacral mobilization  -SW      Manual Rx 3 Type AP glides/spring  -SW         Manual Rx 4    Manual Rx 4 Location manual piriformis stretch  -SW         Manual Rx 5    Manual Rx 5 Location sacral float  -SW      Manual Rx 5 Type MFR  -SW        User Key  (r) = Recorded By, (t) = Taken By, (c) = Cosigned By    Initials Name Provider Type     Eun Hartmann, PT Physical Therapist                          PT OP Goals     Row Name 06/21/18 1200          PT Short Term Goals    STG Date to Achieve 07/12/18  -SW     STG 1 independent with [facility] for mobility and stretching program   -SW     STG 1 Progress Met  -SW     STG 2 patient to  present with inc ability to perform log roll as recommended by PT for spinal protection during bed and other transfer activities.   -     STG 2 Progress Met  -[facility] 2 Progress Comments observed without cues required.    -[facility] 3 patient to present with inc symmetry to pelvic girdle in standing with improved alignment across sacral region to aid in increasing ROM  -     STG 3 Progress Progressing  -[facility] 4 Patient to inc ROM all planes without inc pain.   -     STG 4 Progress New  -[facility] 5 Subjectively improve 70% better overall with ability to tolerate work activities without modifications or restricted duties.    -[facility] 5 Progress New  -[facility] 6 Patient to improve Mod Osw score to 20% or less implying inc ADL function and less pain  -[facility] 6 Progress New  -        Long Term Goals    LTG Date to Achieve --   declined LTG due to patient EDC 7/2/18  -        Time Calculation    PT Goal Re-Cert Due Date 07/12/18  -       User Key  (r) = Recorded By, (t) = Taken By, (c) = Cosigned By    Initials Name Provider Type    WELLINGTON Hartmann, PT Physical Therapist           Therapy Education  Given: HEP  Program: New  How Provided: Verbal, Demonstration  Provided to: Patient  Level of Understanding: Teach back education performed, Verbalized, Demonstrated              Time Calculation:   Start Time: 1210  Stop Time: 1308  Time Calculation (min): 58 min  Therapy Suggested Charges     Code   Minutes Charges    18788 (CPT®)  Pt Neuromusc Re Education Ea 15 Min      08917 (CPT®) Hc Pt Ther Proc Ea 15 Min 15 1    53581 (CPT®) Hc Gait Training Ea 15 Min      33409 (CPT®)  Pt Therapeutic Act Ea 15 Min      16406 (CPT®)  Pt Manual Therapy Ea 15 Min 43 3    11283 (CPT®) Hc Pt Ther Massage- Per 15 Min      81002 (CPT®) Hc Pt Iontophoresis Ea 15 Min      91587 (CPT®)  Pt Elec Stim Ea-Per 15 Min      95232 (CPT®)  Pt Ultrasound Ea 15 Min      92797 (CPT®)  Pt Self  Care/Mgmt/Train Ea 15 Min      Total  58 4        Therapy Charges for Today     Code Description Service Date Service Provider Modifiers Qty    36714682409 HC PT THER PROC EA 15 MIN 6/21/2018 Eun Hartmann, PT GP 1    69617281698 HC PT MANUAL THERAPY EA 15 MIN 6/21/2018 Eun Hartmann, PT GP 3                    Eun Hartmann, PT  6/21/2018

## 2018-06-21 NOTE — PROGRESS NOTES
CC: DUYEN visit, history reviewed, no changes noted    ROS:Positive No complaints   Negative leaking fluid from the vagina, swelling in her legs, headache, visual changes, low back pain and heartburn      Educated on:FKC, labor signs, VB    A/Plan: f/u in 1 week/s   Diagnoses and all orders for this visit:    38 weeks gestation of pregnancy    Not immune to rubella    Rh negative state in antepartum period    Back pain affecting pregnancy in third trimester

## 2018-06-26 ENCOUNTER — ROUTINE PRENATAL (OUTPATIENT)
Dept: OBSTETRICS AND GYNECOLOGY | Facility: CLINIC | Age: 23
End: 2018-06-26

## 2018-06-26 VITALS — BODY MASS INDEX: 43.43 KG/M2 | WEIGHT: 253 LBS | DIASTOLIC BLOOD PRESSURE: 68 MMHG | SYSTOLIC BLOOD PRESSURE: 117 MMHG

## 2018-06-26 DIAGNOSIS — Z28.39 RUBELLA NON-IMMUNE STATUS, ANTEPARTUM: ICD-10-CM

## 2018-06-26 DIAGNOSIS — Z3A.39 39 WEEKS GESTATION OF PREGNANCY: ICD-10-CM

## 2018-06-26 DIAGNOSIS — M54.9 BACK PAIN AFFECTING PREGNANCY IN THIRD TRIMESTER: ICD-10-CM

## 2018-06-26 DIAGNOSIS — O09.899 RUBELLA NON-IMMUNE STATUS, ANTEPARTUM: ICD-10-CM

## 2018-06-26 DIAGNOSIS — Z67.91 RH NEGATIVE STATE IN ANTEPARTUM PERIOD: Primary | ICD-10-CM

## 2018-06-26 DIAGNOSIS — O26.899 RH NEGATIVE STATE IN ANTEPARTUM PERIOD: Primary | ICD-10-CM

## 2018-06-26 DIAGNOSIS — O99.891 BACK PAIN AFFECTING PREGNANCY IN THIRD TRIMESTER: ICD-10-CM

## 2018-06-26 PROBLEM — Z78.9 NOT IMMUNE TO RUBELLA: Status: RESOLVED | Noted: 2018-03-05 | Resolved: 2018-06-26

## 2018-06-26 PROCEDURE — 99213 OFFICE O/P EST LOW 20 MIN: CPT | Performed by: NURSE PRACTITIONER

## 2018-06-26 NOTE — PROGRESS NOTES
CC: DUYEN visit; hx reviewed, no changes    ROS: Denies dysuria, vb, LOF, regular contractions, decreased FM headaches or heartburn. Having quite a bit of low back pain but has an appt with Eun tomorrow.     P/E: see note. Pelvis is tilted anteriorly and twisted towards the left a bit. Baby is vertex; however, the shoulders are not down in the pelvis tightly. The head is not pushing down on the cervix well enough to thin it out well. I encouraged her to try to get in to see a chiropractor today or tomorrow for an adjustment on her hips to help facilitate fetal movement into the pelvis.    A/P: 23 y.o.  @ 39w1d by 22 wk jennifer here for routine OB visit.    1. Routine OB care  - Encouraged PNV  - Term labor precautions  - 1 hour WNL, Rubella NON-IMMUNE, GBS POSITIVE, RH negative  - Plan for IOL on 7/3 with Diandra    2. Permanent sterilization  - Plans for PPTL but signed papers too late (maybe on , but no documentation and not in media yet).   - Will plan to have done at 6 weeks pp.   - Instructed to see MD for 2 week PP visit to schedule procedure.

## 2018-06-27 ENCOUNTER — PREP FOR SURGERY (OUTPATIENT)
Dept: OTHER | Facility: HOSPITAL | Age: 23
End: 2018-06-27

## 2018-06-27 ENCOUNTER — HOSPITAL ENCOUNTER (OUTPATIENT)
Dept: PHYSICAL THERAPY | Facility: HOSPITAL | Age: 23
Setting detail: THERAPIES SERIES
End: 2018-06-27

## 2018-06-27 DIAGNOSIS — Z34.90 TERM PREGNANCY: Primary | ICD-10-CM

## 2018-06-27 RX ORDER — SODIUM CHLORIDE 0.9 % (FLUSH) 0.9 %
1-10 SYRINGE (ML) INJECTION AS NEEDED
Status: CANCELLED | OUTPATIENT
Start: 2018-06-27

## 2018-06-27 RX ORDER — SODIUM CHLORIDE, SODIUM LACTATE, POTASSIUM CHLORIDE, CALCIUM CHLORIDE 600; 310; 30; 20 MG/100ML; MG/100ML; MG/100ML; MG/100ML
125 INJECTION, SOLUTION INTRAVENOUS CONTINUOUS
Status: CANCELLED | OUTPATIENT
Start: 2018-06-27

## 2018-06-27 RX ORDER — CARBOPROST TROMETHAMINE 250 UG/ML
250 INJECTION, SOLUTION INTRAMUSCULAR AS NEEDED
Status: CANCELLED | OUTPATIENT
Start: 2018-06-27

## 2018-06-27 RX ORDER — OXYTOCIN/0.9 % SODIUM CHLORIDE 30/500 ML
2-30 PLASTIC BAG, INJECTION (ML) INTRAVENOUS
Status: CANCELLED | OUTPATIENT
Start: 2018-06-27

## 2018-06-27 RX ORDER — MISOPROSTOL 200 UG/1
800 TABLET ORAL AS NEEDED
Status: CANCELLED | OUTPATIENT
Start: 2018-06-27

## 2018-06-27 RX ORDER — ACETAMINOPHEN 325 MG/1
650 TABLET ORAL EVERY 4 HOURS PRN
Status: CANCELLED | OUTPATIENT
Start: 2018-06-27

## 2018-06-27 RX ORDER — LIDOCAINE HYDROCHLORIDE 10 MG/ML
5 INJECTION, SOLUTION EPIDURAL; INFILTRATION; INTRACAUDAL; PERINEURAL AS NEEDED
Status: CANCELLED | OUTPATIENT
Start: 2018-06-27

## 2018-06-27 RX ORDER — METHYLERGONOVINE MALEATE 0.2 MG/ML
200 INJECTION INTRAVENOUS ONCE AS NEEDED
Status: CANCELLED | OUTPATIENT
Start: 2018-06-27

## 2018-07-03 ENCOUNTER — APPOINTMENT (OUTPATIENT)
Dept: LABOR AND DELIVERY | Facility: HOSPITAL | Age: 23
End: 2018-07-03

## 2018-07-05 ENCOUNTER — DOCUMENTATION (OUTPATIENT)
Dept: PHYSICAL THERAPY | Facility: HOSPITAL | Age: 23
End: 2018-07-05

## 2018-07-05 ENCOUNTER — ANESTHESIA EVENT (OUTPATIENT)
Dept: LABOR AND DELIVERY | Facility: HOSPITAL | Age: 23
End: 2018-07-05

## 2018-07-05 ENCOUNTER — APPOINTMENT (OUTPATIENT)
Dept: PHYSICAL THERAPY | Facility: HOSPITAL | Age: 23
End: 2018-07-05

## 2018-07-05 ENCOUNTER — ANESTHESIA (OUTPATIENT)
Dept: LABOR AND DELIVERY | Facility: HOSPITAL | Age: 23
End: 2018-07-05

## 2018-07-05 ENCOUNTER — HOSPITAL ENCOUNTER (INPATIENT)
Facility: HOSPITAL | Age: 23
LOS: 2 days | Discharge: HOME | End: 2018-07-07
Attending: OBSTETRICS & GYNECOLOGY | Admitting: OBSTETRICS & GYNECOLOGY

## 2018-07-05 DIAGNOSIS — M54.9 BACK PAIN AFFECTING PREGNANCY IN THIRD TRIMESTER: Primary | ICD-10-CM

## 2018-07-05 DIAGNOSIS — Z34.90 TERM PREGNANCY: ICD-10-CM

## 2018-07-05 DIAGNOSIS — O99.891 BACK PAIN AFFECTING PREGNANCY IN THIRD TRIMESTER: Primary | ICD-10-CM

## 2018-07-05 LAB
ABO GROUP BLD: NORMAL
AMPHET+METHAMPHET UR QL: NEGATIVE
BARBITURATES UR QL SCN: NEGATIVE
BENZODIAZ UR QL SCN: NEGATIVE
BLD GP AB SCN SERPL QL: NEGATIVE
CANNABINOIDS SERPL QL: NEGATIVE
COCAINE UR QL: NEGATIVE
DEPRECATED RDW RBC AUTO: 41.4 FL (ref 36.4–46.3)
ERYTHROCYTE [DISTWIDTH] IN BLOOD BY AUTOMATED COUNT: 12.6 % (ref 11.5–14.5)
HCT VFR BLD AUTO: 37 % (ref 35–45)
HGB BLD-MCNC: 12.7 G/DL (ref 12–15.5)
Lab: NORMAL
MCH RBC QN AUTO: 31.1 PG (ref 26.5–34)
MCHC RBC AUTO-ENTMCNC: 34.3 G/DL (ref 31.4–36)
MCV RBC AUTO: 90.5 FL (ref 80–98)
METHADONE UR QL SCN: NEGATIVE
OPIATES UR QL: NEGATIVE
OXYCODONE UR QL SCN: NEGATIVE
PLATELET # BLD AUTO: 228 10*3/MM3 (ref 150–450)
PMV BLD AUTO: 11 FL (ref 8–12)
RBC # BLD AUTO: 4.09 10*6/MM3 (ref 3.77–5.16)
RH BLD: NEGATIVE
T&S EXPIRATION DATE: NORMAL
WBC NRBC COR # BLD: 16.42 10*3/MM3 (ref 3.2–9.8)

## 2018-07-05 PROCEDURE — 80307 DRUG TEST PRSMV CHEM ANLYZR: CPT | Performed by: ADVANCED PRACTICE MIDWIFE

## 2018-07-05 PROCEDURE — C1755 CATHETER, INTRASPINAL: HCPCS

## 2018-07-05 PROCEDURE — 59410 OBSTETRICAL CARE: CPT | Performed by: ADVANCED PRACTICE MIDWIFE

## 2018-07-05 PROCEDURE — 86900 BLOOD TYPING SEROLOGIC ABO: CPT | Performed by: ADVANCED PRACTICE MIDWIFE

## 2018-07-05 PROCEDURE — 85027 COMPLETE CBC AUTOMATED: CPT | Performed by: ADVANCED PRACTICE MIDWIFE

## 2018-07-05 PROCEDURE — 86901 BLOOD TYPING SEROLOGIC RH(D): CPT | Performed by: ADVANCED PRACTICE MIDWIFE

## 2018-07-05 PROCEDURE — 10907ZC DRAINAGE OF AMNIOTIC FLUID, THERAPEUTIC FROM PRODUCTS OF CONCEPTION, VIA NATURAL OR ARTIFICIAL OPENING: ICD-10-PCS | Performed by: ADVANCED PRACTICE MIDWIFE

## 2018-07-05 PROCEDURE — 25010000002 ONDANSETRON PER 1 MG: Performed by: NURSE ANESTHETIST, CERTIFIED REGISTERED

## 2018-07-05 PROCEDURE — C1755 CATHETER, INTRASPINAL: HCPCS | Performed by: NURSE ANESTHETIST, CERTIFIED REGISTERED

## 2018-07-05 PROCEDURE — 86850 RBC ANTIBODY SCREEN: CPT | Performed by: ADVANCED PRACTICE MIDWIFE

## 2018-07-05 PROCEDURE — 25010000003 PENICILLIN G POTASSIUM PER 600000 UNITS: Performed by: ADVANCED PRACTICE MIDWIFE

## 2018-07-05 RX ORDER — METHYLERGONOVINE MALEATE 0.2 MG/ML
200 INJECTION INTRAVENOUS ONCE AS NEEDED
Status: DISCONTINUED | OUTPATIENT
Start: 2018-07-05 | End: 2018-07-05 | Stop reason: HOSPADM

## 2018-07-05 RX ORDER — MISOPROSTOL 200 UG/1
800 TABLET ORAL AS NEEDED
Status: DISCONTINUED | OUTPATIENT
Start: 2018-07-05 | End: 2018-07-05 | Stop reason: HOSPADM

## 2018-07-05 RX ORDER — ACETAMINOPHEN 325 MG/1
650 TABLET ORAL EVERY 4 HOURS PRN
Status: DISCONTINUED | OUTPATIENT
Start: 2018-07-05 | End: 2018-07-07 | Stop reason: HOSPADM

## 2018-07-05 RX ORDER — SODIUM CHLORIDE, SODIUM LACTATE, POTASSIUM CHLORIDE, CALCIUM CHLORIDE 600; 310; 30; 20 MG/100ML; MG/100ML; MG/100ML; MG/100ML
INJECTION, SOLUTION INTRAVENOUS
Status: COMPLETED
Start: 2018-07-05 | End: 2018-07-05

## 2018-07-05 RX ORDER — ACETAMINOPHEN 325 MG/1
650 TABLET ORAL EVERY 4 HOURS PRN
Status: DISCONTINUED | OUTPATIENT
Start: 2018-07-05 | End: 2018-07-05 | Stop reason: HOSPADM

## 2018-07-05 RX ORDER — CARBOPROST TROMETHAMINE 250 UG/ML
250 INJECTION, SOLUTION INTRAMUSCULAR AS NEEDED
Status: DISCONTINUED | OUTPATIENT
Start: 2018-07-05 | End: 2018-07-05 | Stop reason: HOSPADM

## 2018-07-05 RX ORDER — DOCUSATE SODIUM 100 MG/1
100 CAPSULE, LIQUID FILLED ORAL 2 TIMES DAILY PRN
Status: DISCONTINUED | OUTPATIENT
Start: 2018-07-05 | End: 2018-07-07 | Stop reason: HOSPADM

## 2018-07-05 RX ORDER — IBUPROFEN 800 MG/1
800 TABLET ORAL EVERY 6 HOURS PRN
Status: DISCONTINUED | OUTPATIENT
Start: 2018-07-05 | End: 2018-07-07 | Stop reason: HOSPADM

## 2018-07-05 RX ORDER — BISACODYL 10 MG
10 SUPPOSITORY, RECTAL RECTAL DAILY
Status: DISCONTINUED | OUTPATIENT
Start: 2018-07-05 | End: 2018-07-07 | Stop reason: HOSPADM

## 2018-07-05 RX ORDER — BUPIVACAINE HYDROCHLORIDE 2.5 MG/ML
INJECTION, SOLUTION EPIDURAL; INFILTRATION; INTRACAUDAL AS NEEDED
Status: DISCONTINUED | OUTPATIENT
Start: 2018-07-05 | End: 2018-07-05 | Stop reason: SURG

## 2018-07-05 RX ORDER — SODIUM CHLORIDE 0.9 % (FLUSH) 0.9 %
1-10 SYRINGE (ML) INJECTION AS NEEDED
Status: DISCONTINUED | OUTPATIENT
Start: 2018-07-05 | End: 2018-07-07 | Stop reason: HOSPADM

## 2018-07-05 RX ORDER — OXYTOCIN/RINGER'S LACTATE 20/1000 ML
PLASTIC BAG, INJECTION (ML) INTRAVENOUS
Status: DISPENSED
Start: 2018-07-05 | End: 2018-07-05

## 2018-07-05 RX ORDER — SODIUM CHLORIDE, SODIUM LACTATE, POTASSIUM CHLORIDE, CALCIUM CHLORIDE 600; 310; 30; 20 MG/100ML; MG/100ML; MG/100ML; MG/100ML
125 INJECTION, SOLUTION INTRAVENOUS CONTINUOUS
Status: DISCONTINUED | OUTPATIENT
Start: 2018-07-05 | End: 2018-07-05

## 2018-07-05 RX ORDER — SODIUM CHLORIDE 0.9 % (FLUSH) 0.9 %
1-10 SYRINGE (ML) INJECTION AS NEEDED
Status: DISCONTINUED | OUTPATIENT
Start: 2018-07-05 | End: 2018-07-05 | Stop reason: HOSPADM

## 2018-07-05 RX ORDER — HYDROCODONE BITARTRATE AND ACETAMINOPHEN 5; 325 MG/1; MG/1
1 TABLET ORAL EVERY 4 HOURS PRN
Status: DISCONTINUED | OUTPATIENT
Start: 2018-07-05 | End: 2018-07-07 | Stop reason: HOSPADM

## 2018-07-05 RX ORDER — LIDOCAINE HYDROCHLORIDE 10 MG/ML
5 INJECTION, SOLUTION EPIDURAL; INFILTRATION; INTRACAUDAL; PERINEURAL AS NEEDED
Status: DISCONTINUED | OUTPATIENT
Start: 2018-07-05 | End: 2018-07-05 | Stop reason: HOSPADM

## 2018-07-05 RX ORDER — BISACODYL 10 MG
10 SUPPOSITORY, RECTAL RECTAL DAILY
Status: DISCONTINUED | OUTPATIENT
Start: 2018-07-05 | End: 2018-07-05

## 2018-07-05 RX ORDER — BISACODYL 10 MG
10 SUPPOSITORY, RECTAL RECTAL DAILY
Status: DISCONTINUED | OUTPATIENT
Start: 2018-07-06 | End: 2018-07-05

## 2018-07-05 RX ORDER — LIDOCAINE HYDROCHLORIDE AND EPINEPHRINE 15; 5 MG/ML; UG/ML
INJECTION, SOLUTION EPIDURAL AS NEEDED
Status: DISCONTINUED | OUTPATIENT
Start: 2018-07-05 | End: 2018-07-05 | Stop reason: SURG

## 2018-07-05 RX ORDER — OXYTOCIN/0.9 % SODIUM CHLORIDE 30/500 ML
2 PLASTIC BAG, INJECTION (ML) INTRAVENOUS
Status: DISCONTINUED | OUTPATIENT
Start: 2018-07-05 | End: 2018-07-05

## 2018-07-05 RX ORDER — OXYTOCIN/RINGER'S LACTATE 20/1000 ML
1000 PLASTIC BAG, INJECTION (ML) INTRAVENOUS CONTINUOUS
Status: CANCELLED | OUTPATIENT
Start: 2018-07-05 | End: 2018-07-05

## 2018-07-05 RX ORDER — ONDANSETRON 2 MG/ML
INJECTION INTRAMUSCULAR; INTRAVENOUS AS NEEDED
Status: DISCONTINUED | OUTPATIENT
Start: 2018-07-05 | End: 2018-07-05 | Stop reason: SURG

## 2018-07-05 RX ADMIN — ONDANSETRON 4 MG: 2 INJECTION INTRAMUSCULAR; INTRAVENOUS at 09:36

## 2018-07-05 RX ADMIN — SODIUM CHLORIDE 3 MILLION UNITS: 9 INJECTION, SOLUTION INTRAVENOUS at 08:08

## 2018-07-05 RX ADMIN — DOCUSATE SODIUM 100 MG: 100 CAPSULE, LIQUID FILLED ORAL at 18:46

## 2018-07-05 RX ADMIN — LIDOCAINE HYDROCHLORIDE AND EPINEPHRINE 3 ML: 15; 5 INJECTION, SOLUTION EPIDURAL at 07:57

## 2018-07-05 RX ADMIN — SODIUM CHLORIDE, POTASSIUM CHLORIDE, SODIUM LACTATE AND CALCIUM CHLORIDE 1000 ML: 600; 310; 30; 20 INJECTION, SOLUTION INTRAVENOUS at 07:51

## 2018-07-05 RX ADMIN — SODIUM CHLORIDE, POTASSIUM CHLORIDE, SODIUM LACTATE AND CALCIUM CHLORIDE 125 ML/HR: 600; 310; 30; 20 INJECTION, SOLUTION INTRAVENOUS at 10:56

## 2018-07-05 RX ADMIN — SODIUM CHLORIDE, POTASSIUM CHLORIDE, SODIUM LACTATE AND CALCIUM CHLORIDE 125 ML/HR: 600; 310; 30; 20 INJECTION, SOLUTION INTRAVENOUS at 04:24

## 2018-07-05 RX ADMIN — OXYTOCIN-SODIUM CHLORIDE 0.9% IV SOLN 30 UNIT/500ML 2 MILLI-UNITS/MIN: 30-0.9/5 SOLUTION at 05:44

## 2018-07-05 RX ADMIN — BISACODYL 10 MG: 10 SUPPOSITORY RECTAL at 23:25

## 2018-07-05 RX ADMIN — ACETAMINOPHEN 650 MG: 325 TABLET ORAL at 20:14

## 2018-07-05 RX ADMIN — HYDROCODONE BITARTRATE AND ACETAMINOPHEN 1 TABLET: 5; 325 TABLET ORAL at 22:02

## 2018-07-05 RX ADMIN — MAGNESIUM HYDROXIDE 10 ML: 2400 SUSPENSION ORAL at 20:17

## 2018-07-05 RX ADMIN — BUPIVACAINE HYDROCHLORIDE 10 ML: 2.5 INJECTION, SOLUTION EPIDURAL; INFILTRATION; INTRACAUDAL; PERINEURAL at 08:02

## 2018-07-05 RX ADMIN — SODIUM CHLORIDE 5 MILLION UNITS: 9 INJECTION, SOLUTION INTRAVENOUS at 04:24

## 2018-07-05 RX ADMIN — Medication 12 ML/HR: at 08:10

## 2018-07-05 RX ADMIN — Medication 10 ML: at 13:21

## 2018-07-05 RX ADMIN — IBUPROFEN 800 MG: 800 TABLET ORAL at 17:23

## 2018-07-05 NOTE — PLAN OF CARE
Problem: Patient Care Overview  Goal: Plan of Care Review  Outcome: Ongoing (interventions implemented as appropriate)   07/05/18 8143   Coping/Psychosocial   Plan of Care Reviewed With patient   Plan of Care Review   Progress improving   OTHER   Outcome Summary showered, vss, voiding, ambulating in room, motrin po for pain     Goal: Individualization and Mutuality  Outcome: Ongoing (interventions implemented as appropriate)    Goal: Discharge Needs Assessment  Outcome: Ongoing (interventions implemented as appropriate)    Goal: Interprofessional Rounds/Family Conf  Outcome: Ongoing (interventions implemented as appropriate)      Problem: Postpartum (Vaginal Delivery) (Adult,Obstetrics,Pediatric)  Goal: Signs and Symptoms of Listed Potential Problems Will be Absent, Minimized or Managed (Postpartum)  Outcome: Ongoing (interventions implemented as appropriate)

## 2018-07-05 NOTE — ANESTHESIA PROCEDURE NOTES
Labor Epidural    Patient location during procedure: OB  Performed By  CRNA: GEOFFREY OROURKE  Preanesthetic Checklist  Completed: patient identified, surgical consent, pre-op evaluation, timeout performed, IV checked, risks and benefits discussed and monitors and equipment checked  Prep:  Pt Position:sitting  Sterile Tech:cap, gloves, gown, mask and sterile barrier  Prep:povidone-iodine 7.5% surgical scrub  Monitoring:blood pressure monitoring and continuous pulse oximetry  Epidural Block Procedure:  Approach:midline  Guidance:landmark technique and palpation technique  Location:L3-L4  Needle Type:Tuohy  Needle Gauge:19 G  Loss of Resistance Medium: saline  Cath Depth at skin:14 cm  Paresthesia: transient  Aspiration:negative  Test Dose:negative  Number of Attempts: 1  Post Assessment:  Dressing:occlusive dressing applied and secured with tape  Pt Tolerance:patient tolerated the procedure well with no apparent complications  Complications:no

## 2018-07-05 NOTE — PROGRESS NOTES
[facility]  Marleny Conn  : 1995  MRN: [id number]  CSN: [id number]    Labor progress note    pain that is well controlled    Min/max vitals past 24 hours:  Temp  Min: 98.4 °F (36.9 °C)  Max: 98.4 °F (36.9 °C)   BP  Min: 121/66  Max: 149/86   Pulse  Min: 68  Max: 102   Resp  Min: 20  Max: 20        FHT's: Baseline ENI103, moderate variability, (+) Accelerations and (+) Decelerations-variable   Cervix: was checked:/ vertex/ -2   Contractions: regular         Plan   1. Continue with augmentation   2. AROM mod amount of clear fluid noted      This document has been electronically signed by PETTY Eduardo on 2018 8:19 AM

## 2018-07-05 NOTE — H&P
Marleny Conn  : 1995  MRN: [id number]  CSN: [id number]    History and Physical    Subjective   Marleny Conn is a 23 y.o. year old  with an Estimated Date of Delivery: 18 currently at 40w3d presenting with regular contractions occuring every 3-4 minutes.    Prenatal care has been with Diandra KING.  It has been benign.    Obstetric History       T1      L1     SAB0   TAB0   Ectopic0   Molar0   Multiple0   Live Births1       # Outcome Date GA Lbr Gonzalez/2nd Weight Sex Delivery Anes PTL Lv   2 Current            1 Term 17 40w0d 09:41 / 01:46 3232 g (7 lb 2 oz) F Vag-Vacuum EPI N AMARILIS      Name: SANJUANITA CONN      Apgar1:  5                Apgar5: 8          Past Medical History:   Diagnosis Date   • Acid reflux    • Allergic rhinitis    • Anxiety    • Bronchitis     probable   • Common cold    • Conjunctivitis    • Dysfunction of eustachian tube    • Examination of eyes and vision     general; NORMAL   • Gastroenteritis    • Headache    • Hemorrhoids     likely   • Infection of skin and subcutaneous tissue    • Migraine    • Nonvenomous insect bite    • Open wound of lower limb    • Pharyngitis    • Rhinitis    • Tick bite    • URI (upper respiratory infection)        Past Surgical History:   Procedure Laterality Date   • WISDOM TOOTH EXTRACTION         Prior to Admission medications    Medication Sig Start Date End Date Taking? Authorizing Provider   Prenatal Vit-Fe Fumarate-FA (MYNATAL PLUS) tablet Take 1 tablet by mouth Daily. 18  Yes PETTY Thomason       No Known Allergies    Family History   Problem Relation Age of Onset   • Hypertension Brother    • Hypertension Paternal Grandmother    • Hypertension Maternal Grandmother    • Diabetes Maternal Grandmother    • No Known Problems Father    • No Known Problems Mother    • Heart attack Paternal Grandfather    • Hypertension Maternal Grandfather    • Sleep apnea Maternal Grandfather         Social History   Substance Use Topics   • Smoking status: Former Smoker     Packs/day: 0.25     Years: 10.00     Types: Cigarettes     Quit date: 1/7/2017   • Smokeless tobacco: Never Used   • Alcohol use No       Review of Systems   Constitutional: Negative.    Respiratory: Negative.    Cardiovascular: Negative.    Gastrointestinal: Negative.    Genitourinary: Negative.    Musculoskeletal: Negative.    Skin: Negative.    Neurological: Negative.    Psychiatric/Behavioral: Negative.              Objective     /73   Pulse 93   Temp 98.4 °F (36.9 °C) (Oral)   Resp 20   Wt 115 kg (254 lb)   LMP  (LMP Unknown)   Breastfeeding? No   BMI 43.60 kg/m²       Physical Exam   Constitutional: She is oriented to person, place, and time. She appears well-developed and well-nourished.   HENT:   Head: Normocephalic and atraumatic.   Eyes: EOM are normal. Pupils are equal, round, and reactive to light.   Neck: Normal range of motion. Neck supple.   Cardiovascular: Normal rate, regular rhythm, normal heart sounds and intact distal pulses.    Pulmonary/Chest: Effort normal and breath sounds normal.   Abdominal: Soft.   Musculoskeletal: Normal range of motion.   Neurological: She is alert and oriented to person, place, and time.   Skin: Skin is warm and dry.   Psychiatric: She has a normal mood and affect. Her behavior is normal. Judgment and thought content normal.       Prenatal Labs  Lab Results   Component Value Date    HGB 12.7 07/05/2018    HEPBSAG Negative 02/27/2018    ABO O 07/05/2018    RH Negative 07/05/2018    ABSCRN Negative 04/11/2018    JSS4FHX8 Negative 02/27/2018    HEPCVIRUSABY Negative 02/27/2018    URINECX Mixed Gabrielle Isolated 02/27/2018       Current Labs Reviewed   Pertinent labs reviewed.       Assessment   1. IUP at 40w3d  2. Group B strep status: positive  3. Reactive NST  4. Regular painful contractions     Plan   1. Continue with augmentation   2. Epidural  3. AROM with 2nd dose of PCN  G  4. Anticipate     Diandra Robins, APRN  2018  7:30 AM

## 2018-07-05 NOTE — ANESTHESIA POSTPROCEDURE EVALUATION
Patient: Marleny Conn    Procedure Summary     Date:  07/05/18 Room / Location:      Anesthesia Start:  0747 Anesthesia Stop:  1114    Procedure:  LABOR ANALGESIA Diagnosis:      Scheduled Providers:   Provider:  Alena Lee CRNA    Anesthesia Type:  epidural ASA Status:  2          Anesthesia Type: epidural  Last vitals  BP   143/64 (07/05/18 1115)   Temp   98.8 °F (37.1 °C) (07/05/18 1106)   Pulse   96 (07/05/18 1115)   Resp   20 (07/05/18 1106)     SpO2   98 % (07/05/18 0818)     Post Anesthesia Care and Evaluation    Patient location during evaluation: bedside  Patient participation: complete - patient participated  Level of consciousness: awake and alert  Pain score: 0  Pain management: adequate  Airway patency: patent  Anesthetic complications: No anesthetic complications  PONV Status: none  Cardiovascular status: acceptable  Respiratory status: acceptable  Hydration status: acceptable  Post Neuraxial Block status: No signs or symptoms of PDPH

## 2018-07-06 LAB
FETAL BLEED: NEGATIVE
HCT VFR BLD AUTO: 31.1 % (ref 35–45)
HGB BLD-MCNC: 10.6 G/DL (ref 12–15.5)
NUMBER OF DOSES: NORMAL

## 2018-07-06 PROCEDURE — 85014 HEMATOCRIT: CPT | Performed by: ADVANCED PRACTICE MIDWIFE

## 2018-07-06 PROCEDURE — 25010000003 RHO D IMMUNE GLOBULIN 1500 UNITS SOLUTION PREFILLED SYRINGE: Performed by: ADVANCED PRACTICE MIDWIFE

## 2018-07-06 PROCEDURE — 85018 HEMOGLOBIN: CPT | Performed by: ADVANCED PRACTICE MIDWIFE

## 2018-07-06 PROCEDURE — 85461 HEMOGLOBIN FETAL: CPT | Performed by: ADVANCED PRACTICE MIDWIFE

## 2018-07-06 RX ADMIN — MEASLES, MUMPS, AND RUBELLA VIRUS VACCINE LIVE 0.5 ML: 1000; 12500; 1000 INJECTION, POWDER, LYOPHILIZED, FOR SUSPENSION SUBCUTANEOUS at 19:54

## 2018-07-06 RX ADMIN — ACETAMINOPHEN 650 MG: 325 TABLET ORAL at 17:56

## 2018-07-06 RX ADMIN — IBUPROFEN 800 MG: 800 TABLET ORAL at 22:58

## 2018-07-06 RX ADMIN — IBUPROFEN 800 MG: 800 TABLET ORAL at 14:06

## 2018-07-06 RX ADMIN — HYDROCODONE BITARTRATE AND ACETAMINOPHEN 1 TABLET: 5; 325 TABLET ORAL at 04:37

## 2018-07-06 RX ADMIN — HUMAN RHO(D) IMMUNE GLOBULIN 1500 UNITS: 300 INJECTION, SOLUTION INTRAMUSCULAR at 19:51

## 2018-07-06 RX ADMIN — IBUPROFEN 800 MG: 800 TABLET ORAL at 04:36

## 2018-07-06 NOTE — L&D DELIVERY NOTE
[facility]  Vaginal Delivery Note    Delivery     Delivery: Vaginal, Spontaneous Delivery     YOB: 2020    Time of Birth: 10:54 AM      Anesthesia: Epidural     Delivering clinician: Diandra Robins       Delivery narrative:  Patient is a now  who presented at 40w3d weeks gestation for regular contractions. Course of labor was uncomplicated. Cervix was 4.5 cm on admission. She received an epidural for pain control in her labor. She had artificial rupture of membranes at 0815. Cervix progressed to complete at 1024. Patient proceeded to a spontaneous vaginal delivery of a viable female infant at 1054, over a an intact perineum. Loose nuchal X 2 noted. Shoulders delivered without difficulty. Lusty cry. Infant placed on maternal abdomen. After cessation of pulsating, cord was double-clamped and cut with 3 vessels noted. Cord blood specimen was obtained and sent to lab for cord blood profile. Placenta delivered spontaneously at 1058 in Conrad position. Fundus was firm to massage. Estimated blood loss: Est. Blood Loss (mL): 192 mL (Filed from Delivery Summary) (18 1054). Inspection of vaginal wall, perineum, and vestibule revealed no lacerations. APGARS: 8   @ 1 minute / 9   @ 5 minutes. Infant weight: 3345 g (7 lb 6 oz) . Mother and baby enter recovery in stable condition.       Complications  none    Infant    Findings: female  infant     Infant observations: Weight: 3345 g (7 lb 6 oz)   Length: 20  in  Observations/Comments:         Apgars: 8   @ 1 minute /    9   @ 5 minutes     Placenta, Cord, and Fluid    Placenta delivered  Spontaneous  at    10:58 AM     Cord: 3 vessels  present.   Nuchal Cord?  yes; Number of nuchal loops present:  2    Cord blood obtained: Yes    Cord gases obtained:  No      Repair    Episiotomy: None    Lacerations: No   Estimated Blood Loss: Est. Blood Loss (mL): 192 mL (Filed from Delivery Summary) (18 1054)       Disposition  Mother to Mother  Baby/Postpartum  in stable condition currently.  Baby to remains with mom  in stable condition currently.          This document has been electronically signed by PETTY Eduardo on July 5, 2018 7:46 PM          This document has been electronically signed by PETTY Eduardo on July 5, 2018 7:46 PM

## 2018-07-06 NOTE — PAYOR COMM NOTE
"Marleny Conn (23 y.o. Female)     Date of Birth Social Security Number Address Home Phone MRN    1995  [address]  [facility] [phone] [id number]    Mandaen Marital Status          Rastafarian Single       Admission Date Admission Type Admitting Provider Attending Provider Department, Room/Bed    18 Elective Friday, Elizabeth SMITH MD FriElizabeth sherman MD [facility] MOTHER BABY, M759/1    Discharge Date Discharge Disposition Discharge Destination                       Attending Provider:  Elizabeth Kenney MD    Allergies:  No Known Allergies    Isolation:  None   Infection:  None   Code Status:  CPR    Ht:  162.6 cm (64\")   Wt:  115 kg (254 lb)    Admission Cmt:  None   Principal Problem:  None                Active Insurance as of 2018     Primary Coverage     Payor Plan Insurance Group Employer/Plan Group    [employer] [place] [employer] [place]ICA[place]      Payor Plan Address Payor Plan Phone Number Effective From Effective To    [address] [address] [phone] 2016     [facility] [id number]       Subscriber Name Subscriber Birth Date Member ID       MARLENY CONN 1995 [id number]                 Emergency Contacts      (Rel.) Home Phone Work Phone Mobile Phone    Magali Miller (Mother) [phone] -- [phone]        [facility]  P:[phone]  F:[phone]       History & Physical      PETTY Eduardo at 2018  7:30 AM     Attestation signed by Elizabeth Kenney MD at 2018  7:40 AM    Patient discussed with me, agree with plan of care.             This document has been electronically signed by Elizabeth Kenney MD on 2018 7:40 AM                      Marleny Conn  : 1995  MRN: [id number]  CSN: [id number]    History and Physical    Subjective   Marleny Conn is a 23 y.o. year old  with an Estimated Date of Delivery: 18 currently at 40w3d presenting " with regular contractions occuring every 3-4 minutes.    Prenatal care has been with Diandra KING.  It has been benign.    Obstetric History       T1      L1     SAB0   TAB0   Ectopic0   Molar0   Multiple0   Live Births1       # Outcome Date GA Lbr Gonzalez/2nd Weight Sex Delivery Anes PTL Lv   2 Current            1 Term 17 40w0d 09:41 / 01:46 3232 g (7 lb 2 oz) F Vag-Vacuum EPI N AMARILIS      Name: SANJUANITA ESTEBAN      Apgar1:  5                Apgar5: 8          Past Medical History:   Diagnosis Date   • Acid reflux    • Allergic rhinitis    • Anxiety    • Bronchitis     probable   • Common cold    • Conjunctivitis    • Dysfunction of eustachian tube    • Examination of eyes and vision     general; NORMAL   • Gastroenteritis    • Headache    • Hemorrhoids     likely   • Infection of skin and subcutaneous tissue    • Migraine    • Nonvenomous insect bite    • Open wound of lower limb    • Pharyngitis    • Rhinitis    • Tick bite    • URI (upper respiratory infection)        Past Surgical History:   Procedure Laterality Date   • WISDOM TOOTH EXTRACTION         Prior to Admission medications    Medication Sig Start Date End Date Taking? Authorizing Provider   Prenatal Vit-Fe Fumarate-FA (MYNATAL PLUS) tablet Take 1 tablet by mouth Daily. 18  Yes PETTY Thomason       No Known Allergies    Family History   Problem Relation Age of Onset   • Hypertension Brother    • Hypertension Paternal Grandmother    • Hypertension Maternal Grandmother    • Diabetes Maternal Grandmother    • No Known Problems Father    • No Known Problems Mother    • Heart attack Paternal Grandfather    • Hypertension Maternal Grandfather    • Sleep apnea Maternal Grandfather        Social History   Substance Use Topics   • Smoking status: Former Smoker     Packs/day: 0.25     Years: 10.00     Types: Cigarettes     Quit date: 2017   • Smokeless tobacco: Never Used   • Alcohol use No       Review of Systems    Constitutional: Negative.    Respiratory: Negative.    Cardiovascular: Negative.    Gastrointestinal: Negative.    Genitourinary: Negative.    Musculoskeletal: Negative.    Skin: Negative.    Neurological: Negative.    Psychiatric/Behavioral: Negative.              Objective     /73   Pulse 93   Temp 98.4 °F (36.9 °C) (Oral)   Resp 20   Wt 115 kg (254 lb)   LMP  (LMP Unknown)   Breastfeeding? No   BMI 43.60 kg/m²        Physical Exam   Constitutional: She is oriented to person, place, and time. She appears well-developed and well-nourished.   HENT:   Head: Normocephalic and atraumatic.   Eyes: EOM are normal. Pupils are equal, round, and reactive to light.   Neck: Normal range of motion. Neck supple.   Cardiovascular: Normal rate, regular rhythm, normal heart sounds and intact distal pulses.    Pulmonary/Chest: Effort normal and breath sounds normal.   Abdominal: Soft.   Musculoskeletal: Normal range of motion.   Neurological: She is alert and oriented to person, place, and time.   Skin: Skin is warm and dry.   Psychiatric: She has a normal mood and affect. Her behavior is normal. Judgment and thought content normal.       Prenatal Labs  Lab Results   Component Value Date    HGB 12.7 2018    HEPBSAG Negative 2018    ABO O 2018    RH Negative 2018    ABSCRN Negative 2018    NOZ0CQB4 Negative 2018    HEPCVIRUSABY Negative 2018    URINECX Mixed Gabrielle Isolated 2018       Current Labs Reviewed   Pertinent labs reviewed.       Assessment   1. IUP at 40w3d  2. Group B strep status: positive  3. Reactive NST  4. Regular painful contractions     Plan   1. Continue with augmentation   2. Epidural  3. AROM with 2nd dose of PCN G  4. Anticipate     PETTY Eduardo  2018  7:30 AM       Electronically signed by Elizabeth Kenney MD at 2018  7:40 AM       Hospital Medications (all)       Dose Frequency Start End    acetaminophen (TYLENOL) tablet 650 mg  "650 mg Every 4 Hours PRN 7/5/2018     Sig - Route: Take 2 tablets by mouth Every 4 (Four) Hours As Needed for Mild Pain . - Oral    benzocaine-lanolin-aloe vera (DERMOPLAST) 20-0.5 % topical spray  As Needed 7/5/2018     Sig - Route: Apply  topically As Needed for Mild Pain  (perineal pain). - Topical    bisacodyl (DULCOLAX) suppository 10 mg 10 mg Daily 7/5/2018     Sig - Route: Insert 1 suppository into the rectum Daily. - Rectal    docusate sodium (COLACE) capsule 100 mg 100 mg 2 Times Daily PRN 7/5/2018     Sig - Route: Take 1 capsule by mouth 2 (Two) Times a Day As Needed for Constipation. - Oral    HYDROcodone-acetaminophen (NORCO) 5-325 MG per tablet 1 tablet 1 tablet Every 4 Hours PRN 7/5/2018 7/15/2018    Sig - Route: Take 1 tablet by mouth Every 4 (Four) Hours As Needed for Moderate Pain . - Oral    hydrocortisone (ANUSOL-HC) 2.5 % rectal cream 1 application 1 application As Needed 7/5/2018     Sig - Route: Insert 1 application into the rectum As Needed for Hemorrhoids. - Rectal    ibuprofen (ADVIL,MOTRIN) tablet 800 mg 800 mg Every 6 Hours PRN 7/5/2018     Sig - Route: Take 1 tablet by mouth Every 6 (Six) Hours As Needed for Mild Pain . - Oral    lactated ringers bolus 1,000 mL 1,000 mL Once As Needed 7/5/2018 7/5/2018    Sig - Route: Infuse 1,000 mL into a venous catheter 1 (One) Time As Needed (epidural). - Intravenous    magnesium hydroxide (MILK OF MAGNESIA) suspension 2400 mg/10mL 10 mL 10 mL Daily PRN 7/5/2018     Sig - Route: Take 10 mL by mouth Daily As Needed for Constipation. - Oral    Oxytocin-Lactated Ringers (PITOCIN) 20 UNIT/L in lactated Ringer's 1000 mL IVPB  - ADS Override Pull   7/5/2018 7/5/2018    Notes to Pharmacy: MANUEL BENZ: cabinet override    penicillin G potassium 5 Million Units in sodium chloride 0.9 % 100 mL IVPB 5 Million Units Once 7/5/2018 7/5/2018    Sig - Route: Infuse 5 Million Units into a venous catheter 1 (One) Time. - Intravenous    Linked Group 1:  \"Followed by\" " Linked Group Details        pramoxine-hydrocortisone 1-1 % foam  As Needed 7/5/2018     Sig - Route: Apply  topically As Needed for Hemorrhoids. - Topical    sodium chloride 0.9 % flush 1-10 mL 1-10 mL As Needed 7/5/2018     Sig - Route: Infuse 1-10 mL into a venous catheter As Needed for Line Care. - Intravenous    Tdap (BOOSTRIX) injection 0.5 mL 0.5 mL During Hospitalization 7/5/2018     Sig - Route: Inject 0.5 mL into the shoulder, thigh, or buttocks During Hospitalization for Immunization. - Intramuscular    acetaminophen (TYLENOL) tablet 650 mg (Discontinued) 650 mg Every 4 Hours PRN 7/5/2018 7/5/2018    Sig - Route: Take 2 tablets by mouth Every 4 (Four) Hours As Needed for Mild Pain  or Headache. - Oral    Reason for Discontinue: Patient Transfer    acetaminophen (TYLENOL) tablet 650 mg (Discontinued) 650 mg Every 4 Hours PRN 7/5/2018 7/5/2018    Sig - Route: Take 2 tablets by mouth Every 4 (Four) Hours As Needed for Mild Pain  or Headache. - Oral    Reason for Discontinue: Patient Transfer    bisacodyl (DULCOLAX) suppository 10 mg (Discontinued) 10 mg Daily 7/6/2018 7/5/2018    Sig - Route: Insert 1 suppository into the rectum Daily. - Rectal    bisacodyl (DULCOLAX) suppository 10 mg (Discontinued) 10 mg Daily 7/5/2018 7/5/2018    Sig - Route: Insert 1 suppository into the rectum Daily. - Rectal    carboprost (HEMABATE) injection 250 mcg (Discontinued) 250 mcg As Needed 7/5/2018 7/5/2018    Sig - Route: Inject 1 mL into the shoulder, thigh, or buttocks As Needed (hemorrhage). - Intramuscular    Reason for Discontinue: Patient Transfer    lactated ringers infusion (Discontinued) 125 mL/hr Continuous 7/5/2018 7/5/2018    Sig - Route: Infuse 125 mL/hr into a venous catheter Continuous. - Intravenous    lidocaine PF 1% (XYLOCAINE) injection 5 mL (Discontinued) 5 mL As Needed 7/5/2018 7/5/2018    Sig - Route: Inject 5 mL into the skin As Needed (IV starts). - Intradermal    Reason for Discontinue: Patient  "Transfer    methylergonovine (METHERGINE) injection 200 mcg (Discontinued) 200 mcg Once As Needed 7/5/2018 7/5/2018    Sig - Route: Inject 1 mL into the shoulder, thigh, or buttocks 1 (One) Time As Needed (hemorrhage). - Intramuscular    Reason for Discontinue: Patient Transfer    misoprostol (CYTOTEC) tablet 800 mcg (Discontinued) 800 mcg As Needed 7/5/2018 7/5/2018    Sig - Route: Insert 4 tablets into the rectum As Needed (Postpartum Hemorrhage). - Rectal    Reason for Discontinue: Patient Transfer    Oxytocin-Sodium Chloride (PITOCIN) 30-0.9 UT/500ML-% infusion solution (Discontinued) 2 alexander-units/min Titrated 7/5/2018 7/5/2018    Sig - Route: Infuse 0.002 Units/min into a venous catheter Dose Adjusted By Provider As Needed. - Intravenous    penicillin G potassium 3 Million Units in sodium chloride 0.9 % 50 mL IVPB (Discontinued) 3 Million Units Every 4 Hours 7/5/2018 7/5/2018    Sig - Route: Infuse 3 Million Units into a venous catheter Every 4 (Four) Hours. - Intravenous    Linked Group 1:  \"Followed by\" Linked Group Details        sodium chloride 0.9 % flush 1-10 mL (Discontinued) 1-10 mL As Needed 7/5/2018 7/5/2018    Sig - Route: Infuse 1-10 mL into a venous catheter As Needed for Line Care. - Intravenous    Reason for Discontinue: Patient Transfer          Lab Results (last 72 hours)     Procedure Component Value Units Date/Time    Hemoglobin & Hematocrit, Blood [[id number]]  (Abnormal) Collected:  07/06/18 0814    Specimen:  Blood Updated:  07/06/18 0845     Hemoglobin 10.6 (L) g/dL      Hematocrit 31.1 (L) %     Urine Drug Screen - [[id number]]  (Normal) Collected:  07/05/18 0423    Specimen:  Urine Updated:  07/05/18 0507     Amphetamine Screen, Urine Negative     Barbiturates Screen, Urine Negative     Benzodiazepine Screen, Urine Negative     Cocaine Screen, Urine Negative     Methadone Screen, Urine Negative     Opiate Screen Negative     Oxycodone Screen, Urine Negative     THC, Screen, Urine " Negative    Narrative:       Negative Thresholds For Drugs Screened in Urine:     Amphetamines          500 ng/ml  Barbiturates          200 ng/ml  Benzodiazepines       200 ng/ml  Cocaine               150 ng/ml  Methadone             300 ng/mL  Opiates               300 ng/mL  Oxycodone             100 ng/mL  THC                   20 ng/mL    The normal value for all drugs tested is negative. This report includes final unconfirmed screening results.  A positive result by this assay can be, at your request, sent to the Reference Lab for confirmation by gas chromatography. Unconfirmed results must not be used for non-medical purposes, such as employment or legal testing. Clinical consideration should be applied to any drug of abuse test result, particularly when unconfirmed results are used.    CBC (No Diff) [[id number]]  (Abnormal) Collected:  18    Specimen:  Blood Updated:  18     WBC 16.42 (H) 10*3/mm3      RBC 4.09 10*6/mm3      Hemoglobin 12.7 g/dL      Hematocrit 37.0 %      MCV 90.5 fL      MCH 31.1 pg      MCHC 34.3 g/dL      RDW 12.6 %      RDW-SD 41.4 fl      MPV 11.0 fL      Platelets 228 10*3/mm3              Operative/Procedure Notes (last 72 hours) (Notes from 7/3/2018  8:51 AM through 2018  8:51 AM)      PETTY Eduardo at 2018  7:46 PM          [facility]  Vaginal Delivery Note    Delivery     Delivery: Vaginal, Spontaneous Delivery     YOB: 2020    Time of Birth: 10:54 AM      Anesthesia: Epidural     Delivering clinician: Diandra Robins       Delivery narrative:  Patient is a now  who presented at 40w3d weeks gestation for regular contractions. Course of labor was uncomplicated. Cervix was 4.5 cm on admission. She received an epidural for pain control in her labor. She had artificial rupture of membranes at 0815. Cervix progressed to complete at 1024. Patient proceeded to a spontaneous vaginal delivery of a viable female infant at  1054, over a an intact perineum. Loose nuchal X 2 noted. Shoulders delivered without difficulty. Lusty cry. Infant placed on maternal abdomen. After cessation of pulsating, cord was double-clamped and cut with 3 vessels noted. Cord blood specimen was obtained and sent to lab for cord blood profile. Placenta delivered spontaneously at 1058 in Conrad position. Fundus was firm to massage. Estimated blood loss: Est. Blood Loss (mL): 192 mL (Filed from Delivery Summary) (07/05/18 1054). Inspection of vaginal wall, perineum, and vestibule revealed no lacerations. APGARS: 8   @ 1 minute / 9   @ 5 minutes. Infant weight: 3345 g (7 lb 6 oz) . Mother and baby enter recovery in stable condition.       Complications  none    Infant    Findings: female  infant     Infant observations: Weight: 3345 g (7 lb 6 oz)   Length: 20  in  Observations/Comments:         Apgars: 8   @ 1 minute /    9   @ 5 minutes     Placenta, Cord, and Fluid    Placenta delivered  Spontaneous  at   7/5 10:58 AM     Cord: 3 vessels  present.   Nuchal Cord?  yes; Number of nuchal loops present:  2    Cord blood obtained: Yes    Cord gases obtained:  No      Repair    Episiotomy: None    Lacerations: No   Estimated Blood Loss: Est. Blood Loss (mL): 192 mL (Filed from Delivery Summary) (07/05/18 1054)       Disposition  Mother to Mother Baby/Postpartum  in stable condition currently.  Baby to remains with mom  in stable condition currently.          This document has been electronically signed by PETTY Eduardo on July 5, 2018 7:46 PM          This document has been electronically signed by PETTY Eduardo on July 5, 2018 7:46 PM        Electronically signed by PETTY Eduardo at 7/5/2018  7:49 PM          Physician Progress Notes (last 72 hours) (Notes from 7/3/2018  8:51 AM through 7/6/2018  8:51 AM)      PETTY Eduardo at 7/5/2018  8:19 AM           Vamsi Gauthier  Senia  : 1995  MRN: [id number]  CSN: [id number]    Labor progress note    pain that is well controlled    Min/max vitals past 24 hours:  Temp  Min: 98.4 °F (36.9 °C)  Max: 98.4 °F (36.9 °C)   BP  Min: 121/66  Max: 149/86   Pulse  Min: 68  Max: 102   Resp  Min: 20  Max: 20        FHT's: Baseline [id number], moderate variability, (+) Accelerations and (+) Decelerations-variable   Cervix: was checked:/ vertex/ -2   Contractions: regular         Plan   5. Continue with augmentation   6. AROM mod amount of clear fluid noted      This document has been electronically signed by PETTY Eduardo on 2018 8:19 AM              Electronically signed by PETTY Eduardo at 2018  8:20 AM

## 2018-07-06 NOTE — PROGRESS NOTES
POSTPARTUM PROGRESS NOTE  NAME: Marleny Conn  : 1995  MRN: [id number]      LOS: 1 day     Chief Complaint: Vaginal Soreness    Subjective:     Interval History:  Pain well controlled with medications, (+) Flatus, (+) BM, lochia minimal, (+) voiding, (+) ambulation. Patient is tolerating PO food and fluids well.  Desires tubal ligation for birth control. Patient is breast feeding..    Objective:     Vital Signs  Temp:  [97.6 °F (36.4 °C)-98.8 °F (37.1 °C)] 97.6 °F (36.4 °C)  Heart Rate:  [] 72  Resp:  [16-20] 16  BP: (0-155)/(0-86) 91/57    Physical Exam  GEN: A&O x3, NAD  CVS: RRR, S1/S2, no m/g/r  LUNGS: CTAB, no wheezes, no rhonchi  ABD: Soft, appropriately tender, Fundus: firm below umbilicus.   EXT: no edema, no calf tenderness bilaterally.    Medication Review    Current Facility-Administered Medications:   •  acetaminophen (TYLENOL) tablet 650 mg, 650 mg, Oral, Q4H PRN, Diandra Robins, APRN, 650 mg at 18  •  benzocaine-lanolin-aloe vera (DERMOPLAST) 20-0.5 % topical spray, , Topical, PRN, Diandra Robins, APRN  •  bisacodyl (DULCOLAX) suppository 10 mg, 10 mg, Rectal, Daily, Diandra Robins, APRN, 10 mg at 18 2325  •  docusate sodium (COLACE) capsule 100 mg, 100 mg, Oral, BID PRN, Diandra Robins, APRN, 100 mg at 18 1846  •  HYDROcodone-acetaminophen (NORCO) 5-325 MG per tablet 1 tablet, 1 tablet, Oral, Q4H PRN, Elizabeth Kenney MD, 1 tablet at 18 0437  •  hydrocortisone (ANUSOL-HC) 2.5 % rectal cream 1 application, 1 application, Rectal, PRN, Diandra Robins, APRN  •  ibuprofen (ADVIL,MOTRIN) tablet 800 mg, 800 mg, Oral, Q6H PRN, PETTY Eduardo, 800 mg at 18 0436  •  magnesium hydroxide (MILK OF MAGNESIA) suspension 2400 mg/10mL 10 mL, 10 mL, Oral, Daily PRN, PETTY Eduardo, 10 mL at 18 2017  •  pramoxine-hydrocortisone 1-1 % foam, , Topical, PRN, Diandra Robins, APRN  •  sodium chloride 0.9 % flush 1-10 mL, 1-10 mL,  Intravenous, PRN, PETTY Eduardo  •  Tdap (BOOSTRIX) injection 0.5 mL, 0.5 mL, Intramuscular, During Hospitalization, PETTY Eduardo     Diagnostic Data    Lab Results (last 24 hours)     ** No results found for the last 24 hours. **          I reviewed the patient's new clinical results.    Assessment/Plan:     Marleny Conn 23 y.o. PPD #1 s/p vaginal delivery .  1. Encourage ambulation  2. Continue routine postpartum care.    Plan for disposition: Discharge home in 1-2 days.           This document has been electronically signed by Esmer Sanchez, Medical Student on July 6, 2018 6:22 AM

## 2018-07-06 NOTE — PROGRESS NOTES
[facility]  Marleny Conn  : 1995  MRN: [id number]  CSN: [id number]    Postpartum Day #1  Subjective   The patient has no complaints      Objective     Min/max vitals past 24 hours:   Temp  Min: 97.6 °F (36.4 °C)  Max: 98.6 °F (37 °C)  BP  Min: 91/57  Max: 135/61  Pulse  Min: 69  Max: 94  Pulse  Min: 69  Max: 94        Abdomen: soft, non-tender; bowel sounds normal; no masses   fundus firm and non-tender   Calves: Nontender, no cords palpable   Pelvic: deferred     Lab Results   Component Value Date    WBC 16.42 (H) 2018    HGB 10.6 (L) 2018    HCT 31.1 (L) 2018    MCV 90.5 2018     2018    RH Negative 2018    HEPBSAG Negative 2018        Assessment   1. Postpartum Day #1 S/P vaginal delivery     Plan   1. Continue routine postpartum care      This document has been electronically signed by PETTY Eduardo on 2018 11:31 AM

## 2018-07-06 NOTE — PLAN OF CARE
Problem: Patient Care Overview  Goal: Plan of Care Review  Outcome: Ongoing (interventions implemented as appropriate)   07/06/18 0413   Coping/Psychosocial   Plan of Care Reviewed With patient   Plan of Care Review   Progress improving   OTHER   Outcome Summary vss, ff, ambulating in land, voiding, stooling, pain managed with prn meds and passing stool.      Goal: Individualization and Mutuality  Outcome: Ongoing (interventions implemented as appropriate)    Goal: Discharge Needs Assessment  Outcome: Ongoing (interventions implemented as appropriate)    Goal: Interprofessional Rounds/Family Conf  Outcome: Ongoing (interventions implemented as appropriate)      Problem: Postpartum (Vaginal Delivery) (Adult,Obstetrics,Pediatric)  Goal: Signs and Symptoms of Listed Potential Problems Will be Absent, Minimized or Managed (Postpartum)  Outcome: Ongoing (interventions implemented as appropriate)

## 2018-07-06 NOTE — NURSING NOTE
Patient has been complaining of vaginal & rectal pressure & cramping unrelieved by Tylenol & Ibuprofen this PM. Patient states she needs to have a BM but is unable to after Colace & MOM. + BS all 4 quadrants.  Vaginal exam done, no hematoma present, stool palpated in colon.  Plan: Dulcolax suppository & Norco for pain

## 2018-07-07 VITALS
WEIGHT: 254 LBS | DIASTOLIC BLOOD PRESSURE: 85 MMHG | OXYGEN SATURATION: 95 % | SYSTOLIC BLOOD PRESSURE: 133 MMHG | TEMPERATURE: 97.4 F | BODY MASS INDEX: 43.6 KG/M2 | HEART RATE: 80 BPM | RESPIRATION RATE: 18 BRPM

## 2018-07-07 RX ORDER — IBUPROFEN 800 MG/1
800 TABLET ORAL EVERY 6 HOURS PRN
Qty: 50 TABLET | Refills: 0 | Status: SHIPPED | OUTPATIENT
Start: 2018-07-07 | End: 2018-07-30

## 2018-07-07 RX ORDER — ESCITALOPRAM OXALATE 10 MG/1
10 TABLET ORAL DAILY
Qty: 30 TABLET | Refills: 2 | Status: SHIPPED | OUTPATIENT
Start: 2018-07-07 | End: 2019-02-26

## 2018-07-07 RX ORDER — ACETAMINOPHEN 325 MG/1
650 TABLET ORAL EVERY 4 HOURS PRN
Start: 2018-07-07 | End: 2018-07-30

## 2018-07-07 RX ADMIN — HYDROCODONE BITARTRATE AND ACETAMINOPHEN 1 TABLET: 5; 325 TABLET ORAL at 06:19

## 2018-07-07 NOTE — PLAN OF CARE
Problem: Patient Care Overview  Goal: Discharge Needs Assessment  Outcome: Outcome(s) achieved Date Met: 07/07/18

## 2018-07-07 NOTE — PLAN OF CARE
Problem: Patient Care Overview  Goal: Individualization and Mutuality  Outcome: Outcome(s) achieved Date Met: 07/07/18

## 2018-07-07 NOTE — PLAN OF CARE
Problem: Postpartum (Vaginal Delivery) (Adult,Obstetrics,Pediatric)  Goal: Signs and Symptoms of Listed Potential Problems Will be Absent, Minimized or Managed (Postpartum)  Outcome: Outcome(s) achieved Date Met: 07/07/18

## 2018-07-07 NOTE — DISCHARGE SUMMARY
[facility]  Marleny Conn  : 1995  MRN: [id number]  CSN: [id number]    Discharge Summary:    Date of Admission: 2018  Date of Discharge:  2018    Admitting Diagnosis:  1. IUP @ 40w3d  2. in labor     Discharge Diagnosis:  1. S/P        History and Hospital Course:  Patient is a   who presents in labor.  Her pregnancy was complicated by unc.  Please see History and Physical for full details.       She was admitted and progressed in labor with pitocin augmentation and epidural analgesia to completely dilated. She had a vaginal delivery of a  viable female   infant who weighed 3345 g (7 lb 6 oz)  and APGARs of        APGARS  One minute Five minutes Ten minutes Fifteen minutes Twenty minutes   Skin color: 0   1             Heart rate: 2   2             Grimace: 2   2              Muscle tone: 2   2              Breathin   2              Totals: 8   9              . No immediate complications were encountered. Please see procedure note for full details.      Her postpartum course has been unremarkable. She had no signs or symptoms of acute blood loss anemia. She was ambulating well, voiding without difficulty and lochia was within normal limits. She was bottle feeding without difficulty. She was stable for discharge on postpartum day 2.      Precautions and instructions were discussed with her including but not limited to maintaining a regular diet at home, practicing local hygiene, pelvic rest, and signs and symptoms to report including heavy bleeding, frequent passage of clots, fainting or dizziness, foul odor of lochia, increasing pain, fever, or any other concerns.    She was asked to follow up in the office in 2 week.    Discharge Medications:     Discharge Medications      New Medications      Instructions Start Date   acetaminophen 325 MG tablet  Commonly known as:  TYLENOL   650 mg, Oral, Every 4 Hours PRN      benzocaine-lanolin-aloe vera 20-0.5 % aerosol topical  spray  Commonly known as:  DERMOPLAST   Topical, As Needed      escitalopram 10 MG tablet  Commonly known as:  LEXAPRO   10 mg, Oral, Daily      hydrocortisone 2.5 % rectal cream  Commonly known as:  ANUSOL-HC   1 application, Rectal, As Needed      ibuprofen 800 MG tablet  Commonly known as:  ADVIL,MOTRIN   800 mg, Oral, Every 6 Hours PRN      pramoxine-hydrocortisone 1-1 % foam   Topical, As Needed           Patient will restart all home medications including prenatal vitamins.      This document has been electronically signed by PETTY Eduardo on July 7, 2018 9:20 AM

## 2018-07-07 NOTE — PLAN OF CARE
Problem: Patient Care Overview  Goal: Interprofessional Rounds/Family Conf  Outcome: Outcome(s) achieved Date Met: 07/07/18

## 2018-07-07 NOTE — PLAN OF CARE
Problem: Patient Care Overview  Goal: Plan of Care Review  Outcome: Outcome(s) achieved Date Met: 07/07/18

## 2018-07-16 ENCOUNTER — OFFICE VISIT (OUTPATIENT)
Dept: OBSTETRICS AND GYNECOLOGY | Facility: CLINIC | Age: 23
End: 2018-07-16

## 2018-07-16 VITALS
HEIGHT: 64 IN | WEIGHT: 232 LBS | DIASTOLIC BLOOD PRESSURE: 68 MMHG | SYSTOLIC BLOOD PRESSURE: 115 MMHG | BODY MASS INDEX: 39.61 KG/M2

## 2018-07-16 PROCEDURE — 99024 POSTOP FOLLOW-UP VISIT: CPT | Performed by: ADVANCED PRACTICE MIDWIFE

## 2018-07-16 RX ORDER — PRENATAL VIT NO.126/IRON/FOLIC 28MG-0.8MG
1 TABLET ORAL DAILY
COMMUNITY
End: 2019-02-12

## 2018-07-16 NOTE — PROGRESS NOTES
"Subjective   Chief Complaint   Patient presents with   • Postpartum Care     2 wk pp  of a female infant with a birth weight of 7-6 planning on a tubal     Marlenybree Conn is a 23 y.o. year old  presenting for a postpartum visit.  She had a vaginal delivery.   Prenatal course was been benign.    Since delivery she has not been sexually active.  She does not have concerns about post-partum blues/depression.   She is bottle feeding    The following portions of the patient's history were reviewed and updated as appropriate:current medications, allergies, past medical history and past social history    Smoking status: Former Smoker                                                              Packs/day: 0.25      Years: 10.00        Types: Cigarettes     Quit date: 2017  Smokeless tobacco: Never Used                          Review of Systems   Constitutional: Negative.    Respiratory: Negative.    Cardiovascular: Negative.    Gastrointestinal: Negative.    Genitourinary: Negative.    Musculoskeletal: Negative.    Skin: Negative.    Neurological: Negative.    Psychiatric/Behavioral: Negative.              Objective     /68   Ht 162.6 cm (64\")   Wt 105 kg (232 lb)   Breastfeeding? No   BMI 39.82 kg/m²     General:  well developed; well nourished  no acute distress   Abdomen: Not performed.   Pelvis: Not performed.        :    Problems Addressed this Visit     None      Visit Diagnoses     Routine postpartum follow-up    -  Primary            Plan   1. F/U in 2 weeks with  Frilane for tubal ligation consult  2. Denies C/O  3. Edenberg score 1         This note was electronically signed.    Diandra Robins, APRN  2018    "

## 2018-07-30 ENCOUNTER — OFFICE VISIT (OUTPATIENT)
Dept: OBSTETRICS AND GYNECOLOGY | Facility: CLINIC | Age: 23
End: 2018-07-30

## 2018-07-30 VITALS
SYSTOLIC BLOOD PRESSURE: 115 MMHG | BODY MASS INDEX: 40.97 KG/M2 | WEIGHT: 240 LBS | DIASTOLIC BLOOD PRESSURE: 70 MMHG | HEIGHT: 64 IN

## 2018-07-30 DIAGNOSIS — Z30.09 STERILIZATION CONSULT: Primary | ICD-10-CM

## 2018-07-30 PROCEDURE — 99024 POSTOP FOLLOW-UP VISIT: CPT | Performed by: OBSTETRICS & GYNECOLOGY

## 2018-07-30 RX ORDER — SODIUM CHLORIDE 0.9 % (FLUSH) 0.9 %
1-10 SYRINGE (ML) INJECTION AS NEEDED
Status: CANCELLED | OUTPATIENT
Start: 2018-08-07 | End: 2019-08-07

## 2018-07-30 NOTE — PROGRESS NOTES
Subjective   Chief Complaint   Patient presents with   • Postpartum Care      Marleny Conn is a 23 y.o. year old  presenting to be seen for her postpartum visit.  She had a .  Doing well.  No complaints or concerns.  Lochia stopped.  Baby doing well.  Bottle feeding.  Denies feeling more sad or tearful than usual.  Desires to have a tubal.   Knows this is permanent and desires to have done.  No issues with urination or bowel movements.      Since delivery she has not been sexually active.  She does not have concerns about post-partum blues/depression.   She is bottle feeding.    The following portions of the patient's history were reviewed and updated as appropriate:current medications and allergies     Past Medical History:   Diagnosis Date   • Acid reflux    • Allergic rhinitis    • Anxiety    • Bronchitis     probable   • Common cold    • Conjunctivitis    • Dysfunction of eustachian tube    • Examination of eyes and vision     general; NORMAL   • Gastroenteritis    • Headache    • Hemorrhoids     likely   • Infection of skin and subcutaneous tissue    • Migraine    • Nonvenomous insect bite    • Open wound of lower limb    • Pharyngitis    • Rhinitis    • Tick bite    • URI (upper respiratory infection)      Past Surgical History:   Procedure Laterality Date   • WISDOM TOOTH EXTRACTION       Social History     Social History   • Marital status: Single     Spouse name: N/A   • Number of children: N/A   • Years of education: N/A     Occupational History   • Not on file.     Social History Main Topics   • Smoking status: Former Smoker     Packs/day: 0.25     Years: 10.00     Types: Cigarettes     Quit date: 2017   • Smokeless tobacco: Never Used   • Alcohol use No   • Drug use: No   • Sexual activity: Yes     Partners: Male     Birth control/ protection: Pill     Other Topics Concern   • Not on file     Social History Narrative   • No narrative on file     Family History   Problem Relation Age  "of Onset   • Hypertension Brother    • Hypertension Paternal Grandmother    • Hypertension Maternal Grandmother    • Diabetes Maternal Grandmother    • No Known Problems Father    • No Known Problems Mother    • Heart attack Paternal Grandfather    • Hypertension Maternal Grandfather    • Sleep apnea Maternal Grandfather      Current Outpatient Prescriptions on File Prior to Visit   Medication Sig   • escitalopram (LEXAPRO) 10 MG tablet Take 1 tablet by mouth Daily.   • Prenatal Vit-Fe Fumarate-FA (PRENATAL, CLASSIC, VITAMIN) 28-0.8 MG tablet tablet Take  by mouth Daily.   • [DISCONTINUED] acetaminophen (TYLENOL) 325 MG tablet Take 2 tablets by mouth Every 4 (Four) Hours As Needed for Mild Pain .   • [DISCONTINUED] benzocaine-lanolin-aloe vera (DERMOPLAST) 20-0.5 % aerosol topical spray Apply  topically As Needed for Mild Pain  (perineal pain).   • [DISCONTINUED] hydrocortisone (ANUSOL-HC) 2.5 % rectal cream Insert 1 application into the rectum As Needed for Hemorrhoids.   • [DISCONTINUED] ibuprofen (ADVIL,MOTRIN) 800 MG tablet Take 1 tablet by mouth Every 6 (Six) Hours As Needed for Mild Pain .   • [DISCONTINUED] pramoxine-hydrocortisone 1-1 % foam Apply  topically As Needed for Hemorrhoids.     No current facility-administered medications on file prior to visit.      No Known Allergies    Smoking status: Former Smoker                                                              Packs/day: 0.25      Years: 10.00        Types: Cigarettes     Quit date: 1/7/2017  Smokeless tobacco: Never Used                        Review of Systems - comprehensive ROS performed and all negative.      Objective   /70   Ht 162.6 cm (64\")   Wt 109 kg (240 lb)   Breastfeeding? No   BMI 41.20 kg/m²     General:  well developed; well nourished  no acute distress   Heart: RRR, no m/r/g   Lungs: CTAB   Abdomen: soft, non-tender; no masses  no umbilical or inginual hernias are present  no hepato-splenomegaly   Ext: No edema or CT " bilaterally    Pelvis: Not performed.        Assessment   Normal 4 week postpartum exam  Sterilization consult      Plan   1. Normal 4 week PP visit  - No concerns for PP Depression   - Desires tubal for PPFP.     2. Sterilization   - Consents previously signed 6/21  - Discussed tubal ligation vs bilateral salpingectomy. Discussed that both are a permanent form of birth control and discussed the chance of regret.  Discussed other options including Essure, LARCs, OCPs, and use of Depo. Patient declined, would like tubal ligation. Discussed that with a tubal ligation there is a chance of failure, quoting 13 in 1000 over 5 year period, discussed that if failure occurred that she is at higher risk for ectopic pregnancy.  Discussed that there is some evidence that suggest ovarian cancer is of tubal origin and that if patient desires to have her tubes removed as form of sterilization, it would decrease her risk of developing ovarian cancer (ACOG committee opinion 620.)  Discussed lapaproscopic approach to both of these methods of sterilization.  Discussed that risk of surgery are the same for both approaches and there is a minimal increase in operating time with salpingectomy over the tubal ligation with cautery.  Discussed risk of laparoscopic surgery includes infection, bleeding, and potential damage to surrounding organs including but not limited to bowel, bladder, ureters, nerves, arteries, and veins.  Reviewed that if an injury did occur, additional procedures maybe required at the time of surgery.  Patient would like to proceed with laparoscopic bilateral salpingectomy.    - Discussed with the patient the risk to any surgery in general, including but not limited to death, risk of anesthesia, for which patient will discuss more with anesthesia the day of surgery, the potential development of a blood clot, and the potential development of a urinary tract infection or need for prolonged use of a indwelling catheter.   Discussed the potential for developing muscle weakness or nerve injury due to positioning during surgery.  Discussed the possibility of needed a blood transfusion.      Discussed recovery times and what to expect following surgery.  All questions and concerns were addressed.  Will schedule for a laparoscopic bilateral salpingectomy on 8/7.      This note was electronically signed.    Elizabeth Kenney MD  July 30, 2018

## 2018-07-31 PROBLEM — Z30.09 STERILIZATION CONSULT: Status: ACTIVE | Noted: 2018-07-31

## 2018-08-03 ENCOUNTER — APPOINTMENT (OUTPATIENT)
Dept: PREADMISSION TESTING | Facility: HOSPITAL | Age: 23
End: 2018-08-03

## 2018-08-03 VITALS
HEIGHT: 65 IN | OXYGEN SATURATION: 97 % | DIASTOLIC BLOOD PRESSURE: 72 MMHG | BODY MASS INDEX: 38.49 KG/M2 | SYSTOLIC BLOOD PRESSURE: 118 MMHG | RESPIRATION RATE: 14 BRPM | WEIGHT: 231 LBS | HEART RATE: 71 BPM

## 2018-08-03 DIAGNOSIS — Z30.09 STERILIZATION CONSULT: ICD-10-CM

## 2018-08-03 LAB
ABO GROUP BLD: NORMAL
ANTI-D: NORMAL
B-HCG UR QL: NEGATIVE
BASOPHILS # BLD AUTO: 0.01 10*3/MM3 (ref 0–0.2)
BASOPHILS NFR BLD AUTO: 0.1 % (ref 0–2)
BLD GP AB SCN SERPL QL: POSITIVE
DEPRECATED RDW RBC AUTO: 40.3 FL (ref 36.4–46.3)
EOSINOPHIL # BLD AUTO: 0.19 10*3/MM3 (ref 0–0.7)
EOSINOPHIL NFR BLD AUTO: 1.9 % (ref 0–7)
ERYTHROCYTE [DISTWIDTH] IN BLOOD BY AUTOMATED COUNT: 12.4 % (ref 11.5–14.5)
HCT VFR BLD AUTO: 41.5 % (ref 35–45)
HGB BLD-MCNC: 14.1 G/DL (ref 12–15.5)
IMM GRANULOCYTES # BLD: 0.03 10*3/MM3 (ref 0–0.02)
IMM GRANULOCYTES NFR BLD: 0.3 % (ref 0–0.5)
LYMPHOCYTES # BLD AUTO: 3.77 10*3/MM3 (ref 0.6–4.2)
LYMPHOCYTES NFR BLD AUTO: 36.7 % (ref 10–50)
Lab: NORMAL
MCH RBC QN AUTO: 30.7 PG (ref 26.5–34)
MCHC RBC AUTO-ENTMCNC: 34 G/DL (ref 31.4–36)
MCV RBC AUTO: 90.2 FL (ref 80–98)
MONOCYTES # BLD AUTO: 0.48 10*3/MM3 (ref 0–0.9)
MONOCYTES NFR BLD AUTO: 4.7 % (ref 0–12)
NEUTROPHILS # BLD AUTO: 5.79 10*3/MM3 (ref 2–8.6)
NEUTROPHILS NFR BLD AUTO: 56.3 % (ref 37–80)
PLATELET # BLD AUTO: 292 10*3/MM3 (ref 150–450)
PMV BLD AUTO: 10.4 FL (ref 8–12)
RBC # BLD AUTO: 4.6 10*6/MM3 (ref 3.77–5.16)
RH BLD: NEGATIVE
T&S EXPIRATION DATE: NORMAL
WBC NRBC COR # BLD: 10.27 10*3/MM3 (ref 3.2–9.8)

## 2018-08-03 PROCEDURE — 86870 RBC ANTIBODY IDENTIFICATION: CPT | Performed by: OBSTETRICS & GYNECOLOGY

## 2018-08-03 PROCEDURE — 81025 URINE PREGNANCY TEST: CPT | Performed by: OBSTETRICS & GYNECOLOGY

## 2018-08-03 PROCEDURE — 86850 RBC ANTIBODY SCREEN: CPT | Performed by: OBSTETRICS & GYNECOLOGY

## 2018-08-03 PROCEDURE — 86901 BLOOD TYPING SEROLOGIC RH(D): CPT | Performed by: OBSTETRICS & GYNECOLOGY

## 2018-08-03 PROCEDURE — 86900 BLOOD TYPING SEROLOGIC ABO: CPT | Performed by: OBSTETRICS & GYNECOLOGY

## 2018-08-03 PROCEDURE — 36415 COLL VENOUS BLD VENIPUNCTURE: CPT

## 2018-08-03 PROCEDURE — 85025 COMPLETE CBC W/AUTO DIFF WBC: CPT | Performed by: OBSTETRICS & GYNECOLOGY

## 2018-08-03 RX ORDER — SODIUM CHLORIDE, SODIUM GLUCONATE, SODIUM ACETATE, POTASSIUM CHLORIDE, AND MAGNESIUM CHLORIDE 526; 502; 368; 37; 30 MG/100ML; MG/100ML; MG/100ML; MG/100ML; MG/100ML
1000 INJECTION, SOLUTION INTRAVENOUS CONTINUOUS
Status: CANCELLED | OUTPATIENT
Start: 2018-08-07

## 2018-08-03 NOTE — DISCHARGE INSTRUCTIONS
[facility]  Pre-op Information and Guidelines    You will be called after 2 p.m. the day before your surgery (Friday for Monday surgery) and notified of your time for arrival and approximate surgery time.  If you have not received a call by 4P.M., please contact Same Day Surgery at [phone] of if outside [facility] call [phone].    Please Follow these Important Safety Guidelines:    • The morning of your procedure, take only the medications listed below with   A sip of water:_____________________________________________       ______________________________________________    • DO NOT eat or drink anything after 12:00 midnight the night before surgery  Specific instructions concerning drinking clear liquids will be discussed during  the pre-surgery instruction call the day before your surgery.    • If you take a blood thinner (ex. Plavix, Coumadin, aspirin), ask your doctor when to stop it before surgery  STOP DATE: _________________    • Only 2 visitors are allowed in patient rooms at a time  Your visitors will be asked to wait in the lobby until the admission process is complete with the exception of a parent with a child and patients in need of special assistance.    • YOU CANNOT DRIVE YOURSELF HOME  You must be accompanied by someone who will be responsible for driving you home after surgery and for your care at home.    • DO NOT chew gum, use breath mints, hard candy, or smoke the day of surgery  • DO NOT drink alcohol for at least 24 hours before your surgery  • DO NOT wear any jewelry and remove all body piercing before coming to the hospital  • DO NOT wear make-up to the hospital  • If you are having surgery on an extremity (arm/leg/foot) remove nail polish/artificial nails on the surgical side  • Clothing, glasses, contacts, dentures, and hairpieces must be removed before surgery  • Bathe the night before or the morning of your surgery and do not use powders/lotions on  skin.

## 2018-08-07 ENCOUNTER — ANESTHESIA EVENT (OUTPATIENT)
Dept: PERIOP | Facility: HOSPITAL | Age: 23
End: 2018-08-07

## 2018-08-07 ENCOUNTER — HOSPITAL ENCOUNTER (OUTPATIENT)
Facility: HOSPITAL | Age: 23
Setting detail: HOSPITAL OUTPATIENT SURGERY
Discharge: HOME | End: 2018-08-07
Attending: OBSTETRICS & GYNECOLOGY | Admitting: OBSTETRICS & GYNECOLOGY

## 2018-08-07 ENCOUNTER — ANESTHESIA (OUTPATIENT)
Dept: PERIOP | Facility: HOSPITAL | Age: 23
End: 2018-08-07

## 2018-08-07 VITALS
HEART RATE: 72 BPM | WEIGHT: 235.45 LBS | SYSTOLIC BLOOD PRESSURE: 129 MMHG | DIASTOLIC BLOOD PRESSURE: 79 MMHG | RESPIRATION RATE: 18 BRPM | HEIGHT: 64 IN | BODY MASS INDEX: 40.2 KG/M2 | OXYGEN SATURATION: 98 % | TEMPERATURE: 97.6 F

## 2018-08-07 DIAGNOSIS — Z30.09 STERILIZATION CONSULT: ICD-10-CM

## 2018-08-07 LAB
ABO GROUP BLD: NORMAL
ANTI-D: NORMAL
BLD GP AB SCN SERPL QL: POSITIVE
Lab: NORMAL
RH BLD: NEGATIVE
T&S EXPIRATION DATE: NORMAL

## 2018-08-07 PROCEDURE — 25010000002 FENTANYL CITRATE (PF) 100 MCG/2ML SOLUTION: Performed by: NURSE ANESTHETIST, CERTIFIED REGISTERED

## 2018-08-07 PROCEDURE — 25010000002 DEXAMETHASONE PER 1 MG: Performed by: NURSE ANESTHETIST, CERTIFIED REGISTERED

## 2018-08-07 PROCEDURE — 25010000002 KETOROLAC TROMETHAMINE PER 15 MG: Performed by: NURSE ANESTHETIST, CERTIFIED REGISTERED

## 2018-08-07 PROCEDURE — 25010000002 PROPOFOL 10 MG/ML EMULSION: Performed by: NURSE ANESTHETIST, CERTIFIED REGISTERED

## 2018-08-07 PROCEDURE — 25010000002 ONDANSETRON PER 1 MG: Performed by: NURSE ANESTHETIST, CERTIFIED REGISTERED

## 2018-08-07 PROCEDURE — 25010000002 MIDAZOLAM PER 1 MG: Performed by: NURSE ANESTHETIST, CERTIFIED REGISTERED

## 2018-08-07 PROCEDURE — 86870 RBC ANTIBODY IDENTIFICATION: CPT | Performed by: OBSTETRICS & GYNECOLOGY

## 2018-08-07 PROCEDURE — 88302 TISSUE EXAM BY PATHOLOGIST: CPT | Performed by: OBSTETRICS & GYNECOLOGY

## 2018-08-07 PROCEDURE — 86900 BLOOD TYPING SEROLOGIC ABO: CPT | Performed by: OBSTETRICS & GYNECOLOGY

## 2018-08-07 PROCEDURE — 25010000002 SUCCINYLCHOLINE PER 20 MG: Performed by: NURSE ANESTHETIST, CERTIFIED REGISTERED

## 2018-08-07 PROCEDURE — 58661 LAPAROSCOPY REMOVE ADNEXA: CPT | Performed by: OBSTETRICS & GYNECOLOGY

## 2018-08-07 PROCEDURE — 25010000002 HYDROMORPHONE PER 4 MG: Performed by: NURSE ANESTHETIST, CERTIFIED REGISTERED

## 2018-08-07 PROCEDURE — 86901 BLOOD TYPING SEROLOGIC RH(D): CPT | Performed by: OBSTETRICS & GYNECOLOGY

## 2018-08-07 PROCEDURE — 88302 TISSUE EXAM BY PATHOLOGIST: CPT | Performed by: PATHOLOGY

## 2018-08-07 PROCEDURE — 86850 RBC ANTIBODY SCREEN: CPT | Performed by: OBSTETRICS & GYNECOLOGY

## 2018-08-07 RX ORDER — PROPOFOL 10 MG/ML
VIAL (ML) INTRAVENOUS AS NEEDED
Status: DISCONTINUED | OUTPATIENT
Start: 2018-08-07 | End: 2018-08-07 | Stop reason: SURG

## 2018-08-07 RX ORDER — SODIUM CHLORIDE 0.9 % (FLUSH) 0.9 %
1-10 SYRINGE (ML) INJECTION AS NEEDED
Status: DISCONTINUED | OUTPATIENT
Start: 2018-08-07 | End: 2018-08-07 | Stop reason: HOSPADM

## 2018-08-07 RX ORDER — LABETALOL HYDROCHLORIDE 5 MG/ML
5 INJECTION, SOLUTION INTRAVENOUS
Status: DISCONTINUED | OUTPATIENT
Start: 2018-08-07 | End: 2018-08-07 | Stop reason: HOSPADM

## 2018-08-07 RX ORDER — IBUPROFEN 600 MG/1
600 TABLET ORAL EVERY 6 HOURS PRN
Qty: 30 TABLET | Refills: 0 | Status: SHIPPED | OUTPATIENT
Start: 2018-08-07 | End: 2019-02-12

## 2018-08-07 RX ORDER — DEXAMETHASONE SODIUM PHOSPHATE 4 MG/ML
INJECTION, SOLUTION INTRA-ARTICULAR; INTRALESIONAL; INTRAMUSCULAR; INTRAVENOUS; SOFT TISSUE AS NEEDED
Status: DISCONTINUED | OUTPATIENT
Start: 2018-08-07 | End: 2018-08-07 | Stop reason: SURG

## 2018-08-07 RX ORDER — FLUMAZENIL 0.1 MG/ML
0.2 INJECTION INTRAVENOUS AS NEEDED
Status: DISCONTINUED | OUTPATIENT
Start: 2018-08-07 | End: 2018-08-07 | Stop reason: HOSPADM

## 2018-08-07 RX ORDER — IBUPROFEN 600 MG/1
600 TABLET ORAL EVERY 6 HOURS PRN
Status: DISCONTINUED | OUTPATIENT
Start: 2018-08-07 | End: 2018-08-07 | Stop reason: HOSPADM

## 2018-08-07 RX ORDER — SUCCINYLCHOLINE CHLORIDE 20 MG/ML
INJECTION INTRAMUSCULAR; INTRAVENOUS AS NEEDED
Status: DISCONTINUED | OUTPATIENT
Start: 2018-08-07 | End: 2018-08-07 | Stop reason: SURG

## 2018-08-07 RX ORDER — ACETAMINOPHEN 325 MG/1
650 TABLET ORAL ONCE AS NEEDED
Status: DISCONTINUED | OUTPATIENT
Start: 2018-08-07 | End: 2018-08-07 | Stop reason: HOSPADM

## 2018-08-07 RX ORDER — ONDANSETRON 2 MG/ML
4 INJECTION INTRAMUSCULAR; INTRAVENOUS ONCE AS NEEDED
Status: DISCONTINUED | OUTPATIENT
Start: 2018-08-07 | End: 2018-08-07 | Stop reason: HOSPADM

## 2018-08-07 RX ORDER — NALOXONE HCL 0.4 MG/ML
0.2 VIAL (ML) INJECTION AS NEEDED
Status: DISCONTINUED | OUTPATIENT
Start: 2018-08-07 | End: 2018-08-07 | Stop reason: HOSPADM

## 2018-08-07 RX ORDER — SODIUM CHLORIDE, SODIUM GLUCONATE, SODIUM ACETATE, POTASSIUM CHLORIDE, AND MAGNESIUM CHLORIDE 526; 502; 368; 37; 30 MG/100ML; MG/100ML; MG/100ML; MG/100ML; MG/100ML
1000 INJECTION, SOLUTION INTRAVENOUS CONTINUOUS
Status: DISCONTINUED | OUTPATIENT
Start: 2018-08-07 | End: 2018-08-07 | Stop reason: HOSPADM

## 2018-08-07 RX ORDER — ONDANSETRON 2 MG/ML
INJECTION INTRAMUSCULAR; INTRAVENOUS AS NEEDED
Status: DISCONTINUED | OUTPATIENT
Start: 2018-08-07 | End: 2018-08-07 | Stop reason: SURG

## 2018-08-07 RX ORDER — OXYCODONE AND ACETAMINOPHEN 7.5; 325 MG/1; MG/1
1 TABLET ORAL ONCE AS NEEDED
Status: DISCONTINUED | OUTPATIENT
Start: 2018-08-07 | End: 2018-08-07 | Stop reason: HOSPADM

## 2018-08-07 RX ORDER — OXYCODONE HYDROCHLORIDE AND ACETAMINOPHEN 5; 325 MG/1; MG/1
1-2 TABLET ORAL EVERY 4 HOURS PRN
Qty: 20 TABLET | Refills: 0 | Status: SHIPPED | OUTPATIENT
Start: 2018-08-07 | End: 2018-08-15

## 2018-08-07 RX ORDER — BUPIVACAINE HYDROCHLORIDE AND EPINEPHRINE 5; 5 MG/ML; UG/ML
INJECTION, SOLUTION EPIDURAL; INTRACAUDAL; PERINEURAL AS NEEDED
Status: DISCONTINUED | OUTPATIENT
Start: 2018-08-07 | End: 2018-08-07 | Stop reason: HOSPADM

## 2018-08-07 RX ORDER — DIPHENHYDRAMINE HYDROCHLORIDE 50 MG/ML
12.5 INJECTION INTRAMUSCULAR; INTRAVENOUS
Status: DISCONTINUED | OUTPATIENT
Start: 2018-08-07 | End: 2018-08-07 | Stop reason: HOSPADM

## 2018-08-07 RX ORDER — MIDAZOLAM HYDROCHLORIDE 1 MG/ML
INJECTION INTRAMUSCULAR; INTRAVENOUS AS NEEDED
Status: DISCONTINUED | OUTPATIENT
Start: 2018-08-07 | End: 2018-08-07 | Stop reason: SURG

## 2018-08-07 RX ORDER — LIDOCAINE HYDROCHLORIDE 20 MG/ML
INJECTION, SOLUTION INFILTRATION; PERINEURAL AS NEEDED
Status: DISCONTINUED | OUTPATIENT
Start: 2018-08-07 | End: 2018-08-07 | Stop reason: SURG

## 2018-08-07 RX ORDER — MEPERIDINE HYDROCHLORIDE 50 MG/ML
12.5 INJECTION INTRAMUSCULAR; INTRAVENOUS; SUBCUTANEOUS
Status: DISCONTINUED | OUTPATIENT
Start: 2018-08-07 | End: 2018-08-07 | Stop reason: HOSPADM

## 2018-08-07 RX ORDER — KETOROLAC TROMETHAMINE 30 MG/ML
INJECTION, SOLUTION INTRAMUSCULAR; INTRAVENOUS AS NEEDED
Status: DISCONTINUED | OUTPATIENT
Start: 2018-08-07 | End: 2018-08-07 | Stop reason: SURG

## 2018-08-07 RX ORDER — ROCURONIUM BROMIDE 10 MG/ML
INJECTION, SOLUTION INTRAVENOUS AS NEEDED
Status: DISCONTINUED | OUTPATIENT
Start: 2018-08-07 | End: 2018-08-07 | Stop reason: SURG

## 2018-08-07 RX ORDER — ACETAMINOPHEN 650 MG/1
650 SUPPOSITORY RECTAL ONCE AS NEEDED
Status: DISCONTINUED | OUTPATIENT
Start: 2018-08-07 | End: 2018-08-07 | Stop reason: HOSPADM

## 2018-08-07 RX ORDER — FENTANYL CITRATE 50 UG/ML
INJECTION, SOLUTION INTRAMUSCULAR; INTRAVENOUS AS NEEDED
Status: DISCONTINUED | OUTPATIENT
Start: 2018-08-07 | End: 2018-08-07 | Stop reason: SURG

## 2018-08-07 RX ORDER — EPHEDRINE SULFATE 50 MG/ML
5 INJECTION, SOLUTION INTRAVENOUS ONCE AS NEEDED
Status: DISCONTINUED | OUTPATIENT
Start: 2018-08-07 | End: 2018-08-07 | Stop reason: HOSPADM

## 2018-08-07 RX ORDER — ONDANSETRON 4 MG/1
4 TABLET, ORALLY DISINTEGRATING ORAL EVERY 8 HOURS PRN
Qty: 20 TABLET | Refills: 0 | Status: SHIPPED | OUTPATIENT
Start: 2018-08-07 | End: 2018-08-15

## 2018-08-07 RX ADMIN — FENTANYL CITRATE 50 MCG: 50 INJECTION, SOLUTION INTRAMUSCULAR; INTRAVENOUS at 07:15

## 2018-08-07 RX ADMIN — ROCURONIUM BROMIDE 5 MG: 10 INJECTION INTRAVENOUS at 07:15

## 2018-08-07 RX ADMIN — KETOROLAC TROMETHAMINE 60 MG: 30 INJECTION, SOLUTION INTRAMUSCULAR at 07:55

## 2018-08-07 RX ADMIN — ONDANSETRON 4 MG: 2 INJECTION INTRAMUSCULAR; INTRAVENOUS at 07:45

## 2018-08-07 RX ADMIN — SODIUM CHLORIDE, SODIUM GLUCONATE, SODIUM ACETATE, POTASSIUM CHLORIDE, AND MAGNESIUM CHLORIDE 1000 ML: 526; 502; 368; 37; 30 INJECTION, SOLUTION INTRAVENOUS at 06:13

## 2018-08-07 RX ADMIN — PROPOFOL 50 MG: 10 INJECTION, EMULSION INTRAVENOUS at 07:45

## 2018-08-07 RX ADMIN — MIDAZOLAM 2 MG: 1 INJECTION INTRAMUSCULAR; INTRAVENOUS at 07:08

## 2018-08-07 RX ADMIN — LIDOCAINE HYDROCHLORIDE 80 MG: 20 INJECTION, SOLUTION INFILTRATION; PERINEURAL at 07:15

## 2018-08-07 RX ADMIN — SUCCINYLCHOLINE CHLORIDE 140 MG: 20 INJECTION, SOLUTION INTRAMUSCULAR; INTRAVENOUS at 07:15

## 2018-08-07 RX ADMIN — ROCURONIUM BROMIDE 20 MG: 10 INJECTION INTRAVENOUS at 07:21

## 2018-08-07 RX ADMIN — DEXAMETHASONE SODIUM PHOSPHATE 4 MG: 4 INJECTION, SOLUTION INTRAMUSCULAR; INTRAVENOUS at 07:45

## 2018-08-07 RX ADMIN — PROPOFOL 150 MG: 10 INJECTION, EMULSION INTRAVENOUS at 07:15

## 2018-08-07 RX ADMIN — FENTANYL CITRATE 50 MCG: 50 INJECTION, SOLUTION INTRAMUSCULAR; INTRAVENOUS at 07:25

## 2018-08-07 RX ADMIN — SODIUM CHLORIDE, SODIUM GLUCONATE, SODIUM ACETATE, POTASSIUM CHLORIDE, AND MAGNESIUM CHLORIDE: 526; 502; 368; 37; 30 INJECTION, SOLUTION INTRAVENOUS at 07:07

## 2018-08-07 RX ADMIN — HYDROMORPHONE HYDROCHLORIDE 0.5 MG: 1 INJECTION, SOLUTION INTRAMUSCULAR; INTRAVENOUS; SUBCUTANEOUS at 08:31

## 2018-08-07 NOTE — ANESTHESIA PROCEDURE NOTES
Airway  Urgency: elective    Airway not difficult    General Information and Staff    Patient location during procedure: OR  CRNA: DEEPA LAU    Indications and Patient Condition  Indications for airway management: airway protection    Preoxygenated: yes  Mask difficulty assessment: 2 - vent by mask + OA or adjuvant +/- NMBA    Final Airway Details  Final airway type: endotracheal airway      Successful airway: ETT  Cuffed: yes   Successful intubation technique: direct laryngoscopy  Facilitating devices/methods: intubating stylet  Blade: Whitley  Blade size: #4  ETT size: 7.0 mm  Cormack-Lehane Classification: grade I - full view of glottis  Placement verified by: chest auscultation and capnometry   Measured from: lips  ETT to lips (cm): 20  Number of attempts at approach: 1

## 2018-08-07 NOTE — H&P (VIEW-ONLY)
Subjective   Chief Complaint   Patient presents with   • Postpartum Care      Marleny Cnon is a 23 y.o. year old  presenting to be seen for her postpartum visit.  She had a .  Doing well.  No complaints or concerns.  Lochia stopped.  Baby doing well.  Bottle feeding.  Denies feeling more sad or tearful than usual.  Desires to have a tubal.   Knows this is permanent and desires to have done.  No issues with urination or bowel movements.      Since delivery she has not been sexually active.  She does not have concerns about post-partum blues/depression.   She is bottle feeding.    The following portions of the patient's history were reviewed and updated as appropriate:current medications and allergies     Past Medical History:   Diagnosis Date   • Acid reflux    • Allergic rhinitis    • Anxiety    • Bronchitis     probable   • Common cold    • Conjunctivitis    • Dysfunction of eustachian tube    • Examination of eyes and vision     general; NORMAL   • Gastroenteritis    • Headache    • Hemorrhoids     likely   • Infection of skin and subcutaneous tissue    • Migraine    • Nonvenomous insect bite    • Open wound of lower limb    • Pharyngitis    • Rhinitis    • Tick bite    • URI (upper respiratory infection)      Past Surgical History:   Procedure Laterality Date   • WISDOM TOOTH EXTRACTION       Social History     Social History   • Marital status: Single     Spouse name: N/A   • Number of children: N/A   • Years of education: N/A     Occupational History   • Not on file.     Social History Main Topics   • Smoking status: Former Smoker     Packs/day: 0.25     Years: 10.00     Types: Cigarettes     Quit date: 2017   • Smokeless tobacco: Never Used   • Alcohol use No   • Drug use: No   • Sexual activity: Yes     Partners: Male     Birth control/ protection: Pill     Other Topics Concern   • Not on file     Social History Narrative   • No narrative on file     Family History   Problem Relation Age  "of Onset   • Hypertension Brother    • Hypertension Paternal Grandmother    • Hypertension Maternal Grandmother    • Diabetes Maternal Grandmother    • No Known Problems Father    • No Known Problems Mother    • Heart attack Paternal Grandfather    • Hypertension Maternal Grandfather    • Sleep apnea Maternal Grandfather      Current Outpatient Prescriptions on File Prior to Visit   Medication Sig   • escitalopram (LEXAPRO) 10 MG tablet Take 1 tablet by mouth Daily.   • Prenatal Vit-Fe Fumarate-FA (PRENATAL, CLASSIC, VITAMIN) 28-0.8 MG tablet tablet Take  by mouth Daily.   • [DISCONTINUED] acetaminophen (TYLENOL) 325 MG tablet Take 2 tablets by mouth Every 4 (Four) Hours As Needed for Mild Pain .   • [DISCONTINUED] benzocaine-lanolin-aloe vera (DERMOPLAST) 20-0.5 % aerosol topical spray Apply  topically As Needed for Mild Pain  (perineal pain).   • [DISCONTINUED] hydrocortisone (ANUSOL-HC) 2.5 % rectal cream Insert 1 application into the rectum As Needed for Hemorrhoids.   • [DISCONTINUED] ibuprofen (ADVIL,MOTRIN) 800 MG tablet Take 1 tablet by mouth Every 6 (Six) Hours As Needed for Mild Pain .   • [DISCONTINUED] pramoxine-hydrocortisone 1-1 % foam Apply  topically As Needed for Hemorrhoids.     No current facility-administered medications on file prior to visit.      No Known Allergies    Smoking status: Former Smoker                                                              Packs/day: 0.25      Years: 10.00        Types: Cigarettes     Quit date: 1/7/2017  Smokeless tobacco: Never Used                        Review of Systems - comprehensive ROS performed and all negative.      Objective   /70   Ht 162.6 cm (64\")   Wt 109 kg (240 lb)   Breastfeeding? No   BMI 41.20 kg/m²     General:  well developed; well nourished  no acute distress   Heart: RRR, no m/r/g   Lungs: CTAB   Abdomen: soft, non-tender; no masses  no umbilical or inginual hernias are present  no hepato-splenomegaly   Ext: No edema or CT " bilaterally    Pelvis: Not performed.        Assessment   Normal 4 week postpartum exam  Sterilization consult      Plan   1. Normal 4 week PP visit  - No concerns for PP Depression   - Desires tubal for PPFP.     2. Sterilization   - Consents previously signed 6/21  - Discussed tubal ligation vs bilateral salpingectomy. Discussed that both are a permanent form of birth control and discussed the chance of regret.  Discussed other options including Essure, LARCs, OCPs, and use of Depo. Patient declined, would like tubal ligation. Discussed that with a tubal ligation there is a chance of failure, quoting 13 in 1000 over 5 year period, discussed that if failure occurred that she is at higher risk for ectopic pregnancy.  Discussed that there is some evidence that suggest ovarian cancer is of tubal origin and that if patient desires to have her tubes removed as form of sterilization, it would decrease her risk of developing ovarian cancer (ACOG committee opinion 620.)  Discussed lapaproscopic approach to both of these methods of sterilization.  Discussed that risk of surgery are the same for both approaches and there is a minimal increase in operating time with salpingectomy over the tubal ligation with cautery.  Discussed risk of laparoscopic surgery includes infection, bleeding, and potential damage to surrounding organs including but not limited to bowel, bladder, ureters, nerves, arteries, and veins.  Reviewed that if an injury did occur, additional procedures maybe required at the time of surgery.  Patient would like to proceed with laparoscopic bilateral salpingectomy.    - Discussed with the patient the risk to any surgery in general, including but not limited to death, risk of anesthesia, for which patient will discuss more with anesthesia the day of surgery, the potential development of a blood clot, and the potential development of a urinary tract infection or need for prolonged use of a indwelling catheter.   Discussed the potential for developing muscle weakness or nerve injury due to positioning during surgery.  Discussed the possibility of needed a blood transfusion.      Discussed recovery times and what to expect following surgery.  All questions and concerns were addressed.  Will schedule for a laparoscopic bilateral salpingectomy on 8/7.      This note was electronically signed.    Elizabeth Kenney MD  July 30, 2018

## 2018-08-07 NOTE — INTERVAL H&P NOTE
H&P reviewed. The patient was examined and there are no changes to the H&P.           This document has been electronically signed by Elizabeth Kenney MD on August 7, 2018 6:33 AM

## 2018-08-07 NOTE — OP NOTE
BILATERAL SALPINGECTOMY LAPAROSCOPIC  Progress Note    Marleny Conn  2018    Pre-op Diagnosis:   Sterilization consult [Z30.09]       Post-Op Diagnosis Codes:     * Sterilization consult [Z30.09]    Procedure/CPT® Codes:  NY LAP,RMV  ADNEXAL STRUCTURE [05309]    Procedure(s):  BILATERAL SALPINGECTOMY LAPAROSCOPIC    Surgeon(s):  FridayElizabeth MD    Anesthesia: General    Staff:   Circulator: Elizabeth Rueda RN  Scrub Person: Rocio Millard Stephanie  Assistant: Chante Camargo CSA    Estimated Blood Loss: minimal    Urine Voided: * No values recorded between 2018  7:09 AM and 2018  8:02 AM *    Specimens:                ID Type Source Tests Collected by Time   A :  Tissue Fallopian Tubes, Bilateral TISSUE PATHOLOGY EXAM FridayElizabeth MD 2018 [id number]     Drains:  None    Indications:   24 yo  who desired permanent sterilization.  Patient was counseled on her options and opted for a bilateral salpingectomy given evidence that supports ovarian cancer arises from fallopian tubes and it would provided a permanent form of sterilization. Risk, benefits, and alternatives were discussed and informed consent was obtained.      Findings:  8 week sized anteverted normal appearing uterus.  Normal appearing fallopian tubes bilaterally. Ovaries normal bilaterally.  Normal appearing liver and stomach edge.      Technique:   After all consents were signed, the patient was taken to the operating room where general anesthesia was obtained without difficulty. The patient was placed in the dorsal lithotomy position in [facility] and an examine under anesthesia revealed a mobile 8 week sized uterus with no adnexal masses bilaterally. The patient was prepped and draped in the normal sterile fashion.      A right angle retractors were placed anterior and posteriorly in the patient's vagina and the anterior lip of the cervix grasped with the Allis clamp.  A Meltyr uterine manipulator was  then advanced into the uterus to provide a means to manipulate the uterus. The right angle retractors were then removed from the vagina.     Attention was then turned to the patient's abdomen where a 5 mm skin incision was made in the infra-umbilical fold.     Under direct visualization with the opti-view, the trochar was introduced at a 90 degree angle while tenting the abdominal wall.  The trochar and sleeve were then advanced without difficulty into the abdomen and placement confirmed by direct visualization with the laparoscope and low opening pressure of 4mmHg. The abdominal cavity was insufflated to obtain pneumoperitoneum.       A second 5mm port was then placed in the left lower quadrants 4 finger breaths above the anterior iliac spine under direct visualization.  A third 5mm port was placed in then right lower quadrant under direct visualization.  Using a blunt probe, atraumatic graspers, as well as Trendelenburg positioning, the bowel was then gently moved out of the visual field, and evaluation of the pelvis revealed normal appearing uterus and normal appearing ovaries as well as fallopian tubes bilaterally.  A survey of the patient's abdomen revealed normal upper abdominal anatomy as well.     A Enseal was then advanced through the second port and the patient's left fallopian tube was identified and followed out to the fimbriated end. The Enseal was the used to transect the fallopian tube along the mesosalpinx down to the cornu of the uterus to preform a salpingectomy.  Hemostasis was noted. This process was then repeated on the patient's right fallopian tube.  Hemostasis was noted bilaterally.       All instruments were removed sequentially and under direct visualization from the patient's abdomen. The ports were taken out under direct visualization.   The pneumoperitoneum was evacuated through the right port prior to removal.       The port incisions were then closed with 3-0 Monocryl.      Attention  was turned back down to the vagina and the Kroner was removed and hemostasis was noted.       The patient tolerated the procedure well. Sponge, lap and needle counts were correct x 2. Anesthesia was reversed and patient was extubated and taken to the recovery room in stable condition.     Complications: none          This document has been electronically signed by Elizabeth Kenney MD on August 7, 2018 8:09 AM

## 2018-08-07 NOTE — ANESTHESIA PREPROCEDURE EVALUATION
Anesthesia Evaluation     Patient summary reviewed   NPO Solid Status: > 8 hours  NPO Liquid Status: < 2 hours           Airway   Mallampati: II  TM distance: <3 FB  Neck ROM: full  Narrow palate  Dental - normal exam     Pulmonary - normal exam   (+) recent URI resolved,   Cardiovascular - normal exam  Exercise tolerance: excellent (>7 METS)    NYHA Classification: I        Neuro/Psych  (+) headaches, psychiatric history Anxiety,     GI/Hepatic/Renal/Endo    (+) morbid obesity, GERD well controlled,      Musculoskeletal     Abdominal    Substance History      OB/GYN    (-)  Pregnant        Other                          Anesthesia Plan    ASA 2     general     intravenous induction   Anesthetic plan and risks discussed with patient.

## 2018-08-07 NOTE — ANESTHESIA POSTPROCEDURE EVALUATION
Patient: Marleny Conn    Procedure Summary     Date:  08/07/18 Room / Location:  [facility] OR [address] [place] [address] OR    Anesthesia Start:  0711 Anesthesia Stop:  0817    Procedure:  SALPINGECTOMY LAPAROSCOPIC (Bilateral Abdomen) Diagnosis:       Sterilization consult      (Sterilization consult [Z30.09])    Surgeon:  FridayElizabeth MD Provider:  Esequiel Hewitt MD    Anesthesia Type:  general ASA Status:  2          Anesthesia Type: general  Last vitals  BP   110/55 (08/07/18 0611)   Temp   98 °F (36.7 °C) (08/07/18 0611)   Pulse   62 (08/07/18 0611)   Resp   18 (08/07/18 0611)     SpO2   97 % (08/07/18 0611)     Post Anesthesia Care and Evaluation    Patient location during evaluation: PACU  Patient participation: complete - patient participated  Level of consciousness: awake and alert  Pain management: adequate  Airway patency: patent  Anesthetic complications: No anesthetic complications    Cardiovascular status: acceptable  Respiratory status: acceptable  Hydration status: acceptable

## 2018-08-08 LAB
LAB AP CASE REPORT: NORMAL
PATH REPORT.FINAL DX SPEC: NORMAL
PATH REPORT.GROSS SPEC: NORMAL

## 2018-08-15 ENCOUNTER — OFFICE VISIT (OUTPATIENT)
Dept: OBSTETRICS AND GYNECOLOGY | Facility: CLINIC | Age: 23
End: 2018-08-15

## 2018-08-15 VITALS
HEIGHT: 64 IN | WEIGHT: 233 LBS | SYSTOLIC BLOOD PRESSURE: 107 MMHG | BODY MASS INDEX: 39.78 KG/M2 | DIASTOLIC BLOOD PRESSURE: 66 MMHG

## 2018-08-15 DIAGNOSIS — Z98.890 POST-OPERATIVE STATE: Primary | ICD-10-CM

## 2018-08-15 PROCEDURE — 99024 POSTOP FOLLOW-UP VISIT: CPT | Performed by: OBSTETRICS & GYNECOLOGY

## 2018-08-15 NOTE — PROGRESS NOTES
"Subjective     Chief Complaint   Patient presents with   • Post-op       Marleny Conn is a 23 y.o.  presents today for a post op visit after a l/s bilateral salpingectomy.  No issues with pain.  No issues with urination or bowel movements.  No bleeding.  No CP or SOB.      No changes to PMH, family or social history since last visit.  No new medications or allergies.     Objective      Ht 162.6 cm (64\")   Wt 106 kg (233 lb)   BMI 39.99 kg/m²     Physical Exam  General:   Appears stated age, AAOx3, NAD   Abdomen: Soft, nttp, no masses palpated; port incision c/d/i healing well    Extremities: No CT or edema bilaterally    Neurologic: CN II - XII grossly intact     Pathology ():  FALLOPIAN TUBES (2), BILATERAL:  UNREMARKABLE FALLOPIAN TUBES (2).    Assessment/Plan     ASSESSMENT  1. Post-operative state        PLAN  1. Post op state  - Pathology reviewed with patient, copy given   - All post op restrictions lifted  - RTC in 1 year for annual exam.     Elizabeth Kenney MD  8/15/2018           "

## 2018-10-09 NOTE — THERAPY DISCHARGE NOTE
Outpatient Physical Therapy Discharge Summary         Patient Name: Marleny Conn  : 1995  MRN: [id number]    Today's Date: 10/9/2018  DC date: 18    Visit Dx:    ICD-10-CM ICD-9-CM   1. Back pain affecting pregnancy in third trimester O26.893 646.83    M54.9 724.5           OP PT Discharge Summary  Date of Discharge: 18  Reason for Discharge: Maximum functional potential achieved (Patient attended two visits having not returned in over 30 days.  Therefore, due to lapse of treatment, formal DC was completed.)  Outcomes Achieved: Patient able to partially acheive established goals, Refer to plan of care for updates on goals achieved  Discharge Destination: Home with home program  Discharge Instructions/Additional Comments: Patient given HEP instructions during PT sessions.  Encourage continuation and request new order should patient require additional PT services.       Time Calculation: 7464-3915       Therapy Suggested Charges     Code   Minutes Charges    None                       Eun Hartmann, PT  10/9/2018

## 2019-02-08 ENCOUNTER — TRANSCRIBE ORDERS (OUTPATIENT)
Dept: PHYSICAL THERAPY | Facility: HOSPITAL | Age: 24
End: 2019-02-08

## 2019-02-08 DIAGNOSIS — M54.41 CHRONIC RIGHT-SIDED LOW BACK PAIN WITH RIGHT-SIDED SCIATICA: Primary | ICD-10-CM

## 2019-02-08 DIAGNOSIS — G89.29 CHRONIC RIGHT-SIDED LOW BACK PAIN WITH RIGHT-SIDED SCIATICA: Primary | ICD-10-CM

## 2019-02-12 ENCOUNTER — OFFICE VISIT (OUTPATIENT)
Dept: OBSTETRICS AND GYNECOLOGY | Facility: CLINIC | Age: 24
End: 2019-02-12

## 2019-02-12 ENCOUNTER — APPOINTMENT (OUTPATIENT)
Dept: LAB | Facility: HOSPITAL | Age: 24
End: 2019-02-12

## 2019-02-12 VITALS
WEIGHT: 256 LBS | SYSTOLIC BLOOD PRESSURE: 103 MMHG | DIASTOLIC BLOOD PRESSURE: 60 MMHG | BODY MASS INDEX: 43.71 KG/M2 | HEART RATE: 111 BPM | HEIGHT: 64 IN

## 2019-02-12 DIAGNOSIS — R53.83 OTHER FATIGUE: ICD-10-CM

## 2019-02-12 DIAGNOSIS — N92.0 MENORRHAGIA WITH REGULAR CYCLE: ICD-10-CM

## 2019-02-12 DIAGNOSIS — R10.2 VAGINAL PAIN: ICD-10-CM

## 2019-02-12 DIAGNOSIS — R68.82 DECREASED LIBIDO: ICD-10-CM

## 2019-02-12 DIAGNOSIS — R63.5 ABNORMAL WEIGHT GAIN: ICD-10-CM

## 2019-02-12 DIAGNOSIS — N89.8 VAGINAL DRYNESS: ICD-10-CM

## 2019-02-12 DIAGNOSIS — E66.01 MORBIDLY OBESE (HCC): ICD-10-CM

## 2019-02-12 DIAGNOSIS — L68.0 HIRSUTISM: Primary | ICD-10-CM

## 2019-02-12 LAB — TSH SERPL DL<=0.05 MIU/L-ACNC: 1.39 MIU/ML (ref 0.46–4.68)

## 2019-02-12 PROCEDURE — 83001 ASSAY OF GONADOTROPIN (FSH): CPT | Performed by: ADVANCED PRACTICE MIDWIFE

## 2019-02-12 PROCEDURE — 83525 ASSAY OF INSULIN: CPT | Performed by: ADVANCED PRACTICE MIDWIFE

## 2019-02-12 PROCEDURE — 84144 ASSAY OF PROGESTERONE: CPT | Performed by: ADVANCED PRACTICE MIDWIFE

## 2019-02-12 PROCEDURE — 84270 ASSAY OF SEX HORMONE GLOBUL: CPT | Performed by: ADVANCED PRACTICE MIDWIFE

## 2019-02-12 PROCEDURE — 83002 ASSAY OF GONADOTROPIN (LH): CPT | Performed by: ADVANCED PRACTICE MIDWIFE

## 2019-02-12 PROCEDURE — 84443 ASSAY THYROID STIM HORMONE: CPT | Performed by: ADVANCED PRACTICE MIDWIFE

## 2019-02-12 PROCEDURE — 99213 OFFICE O/P EST LOW 20 MIN: CPT | Performed by: ADVANCED PRACTICE MIDWIFE

## 2019-02-12 PROCEDURE — 84402 ASSAY OF FREE TESTOSTERONE: CPT | Performed by: ADVANCED PRACTICE MIDWIFE

## 2019-02-12 PROCEDURE — 36415 COLL VENOUS BLD VENIPUNCTURE: CPT | Performed by: ADVANCED PRACTICE MIDWIFE

## 2019-02-12 PROCEDURE — 82670 ASSAY OF TOTAL ESTRADIOL: CPT | Performed by: ADVANCED PRACTICE MIDWIFE

## 2019-02-12 PROCEDURE — 84403 ASSAY OF TOTAL TESTOSTERONE: CPT | Performed by: ADVANCED PRACTICE MIDWIFE

## 2019-02-12 RX ORDER — BUPROPION HYDROCHLORIDE 150 MG/1
150 TABLET, EXTENDED RELEASE ORAL DAILY
Qty: 30 TABLET | Refills: 2 | Status: SHIPPED | OUTPATIENT
Start: 2019-02-12 | End: 2019-10-09

## 2019-02-12 RX ORDER — CYCLOBENZAPRINE HCL 10 MG
TABLET ORAL
Refills: 1 | COMMUNITY
Start: 2019-02-07 | End: 2019-02-12

## 2019-02-12 RX ORDER — METHYLPREDNISOLONE 4 MG/1
TABLET ORAL
Refills: 0 | COMMUNITY
Start: 2019-02-07 | End: 2019-02-26

## 2019-02-12 RX ORDER — CYCLOBENZAPRINE HCL 10 MG
10 TABLET ORAL 3 TIMES DAILY PRN
COMMUNITY
End: 2019-02-26

## 2019-02-12 RX ORDER — MELOXICAM 15 MG/1
TABLET ORAL
Refills: 1 | COMMUNITY
Start: 2019-02-07 | End: 2019-09-18

## 2019-02-13 LAB
ESTRADIOL SERPL HS-MCNC: 245.5 PG/ML
FSH SERPL-ACNC: 11.2 MIU/ML
INSULIN SERPL-ACNC: 57.1 UIU/ML (ref 2.6–24.9)
LH SERPL-ACNC: 65.5 MIU/ML
PROGEST SERPL-MCNC: 0.4 NG/ML
SHBG SERPL-SCNC: 45.4 NMOL/L (ref 24.6–122)

## 2019-02-14 ENCOUNTER — HOSPITAL ENCOUNTER (OUTPATIENT)
Dept: PHYSICAL THERAPY | Facility: HOSPITAL | Age: 24
Setting detail: THERAPIES SERIES
Discharge: HOME | End: 2019-02-14

## 2019-02-14 DIAGNOSIS — M54.5 RIGHT LOW BACK PAIN, UNSPECIFIED CHRONICITY, WITH SCIATICA PRESENCE UNSPECIFIED: Primary | ICD-10-CM

## 2019-02-14 DIAGNOSIS — M43.16 SPONDYLOLISTHESIS OF LUMBAR REGION: ICD-10-CM

## 2019-02-14 PROCEDURE — 97161 PT EVAL LOW COMPLEX 20 MIN: CPT | Performed by: PHYSICAL THERAPIST

## 2019-02-14 NOTE — THERAPY EVALUATION
Outpatient Physical Therapy Ortho Initial Evaluation  [facility]     Patient Name: Marleny Conn  : 1995  MRN: [id number]  Today's Date: 2019      Visit Date: 2019  Attendance:   Subjective % Improvement: N.A  Recert Date: 3/8/19  MD appointment: TBD    Therapy Diagnosis: Midline to Right low back pain    Patient Active Problem List   Diagnosis   • Rh negative state in antepartum period   • Rubella non-immune status, antepartum   • Back pain affecting pregnancy in third trimester   • Term pregnancy   • Sterilization consult   • Morbidly obese (CMS/HCC)        Past Medical History:   Diagnosis Date   • Acid reflux    • Allergic rhinitis    • Anxiety    • Anxiety    • Bronchitis     probable   • Common cold    • Conjunctivitis    • Dysfunction of eustachian tube    • Examination of eyes and vision     general; NORMAL   • Gastroenteritis    • Headache    • Hemorrhoids     likely   • Infection of skin and subcutaneous tissue    • Migraine    • Nonvenomous insect bite    • Open wound of lower limb    • Pharyngitis    • Rhinitis    • Tick bite    • URI (upper respiratory infection)         Past Surgical History:   Procedure Laterality Date   • SALPINGECTOMY Bilateral 2018    Procedure: SALPINGECTOMY LAPAROSCOPIC;  Surgeon: Friday, Elizabeth SMITH MD;  Location: [facility];  Service: Obstetrics/Gynecology   • WISDOM TOOTH EXTRACTION         Visit Dx:     ICD-10-CM ICD-9-CM   1. Right low back pain, unspecified chronicity, with sciatica presence unspecified M54.5 724.2   2. Spondylolisthesis of lumbar region M43.16 738.4       Patient History     Row Name 19 1439             History    Chief Complaint  Pain;Difficulty Walking;Difficulty with daily activities  -BB      Type of Pain  Back pain  -BB      Date Current Problem(s) Began  -- about 7-8 months ago  -BB      Brief Description of Current Complaint  Present today with back pain started while pregnant around 2nd trimester of 2nd  "child. Notes pain that is mid line to right side. Reports having xray and being told she has spondylothesis. Notes occasional radicular symptoms right LE. Notes \"alot of popping in my low back and pelvis\". Notes children delivered vaginally.   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  standing, walking   -BB      What Performance Factors Make the Current Problem(s) BETTER?  heat   -BB      Pain Comments  has flexeril with no improvements.   -BB      Is your sleep disturbed?  Yes  -BB         Fall Risk Assessment    Any falls in the past year:  No  -BB        User Key  (r) = Recorded By, (t) = Taken By, (c) = Cosigned By    Initials Name Provider Type    BB Iris Carroll PT Physical Therapist          PT Ortho     Row Name 02/14/19 0922       Subjective Comments    Subjective Comments  See patient history   -BB       Precautions and Contraindications    Precautions  \"minimal L5 spondylolisthesis\"  -BB       Subjective Pain    Able to rate subjective pain?  yes  -BB    Pre-Treatment Pain Level  9  -BB    Post-Treatment Pain Level  2  -BB       Posture/Observations    Posture/Observations Comments  Right anterior rotation of ASIS noted. Patient tends to lean to left in sitting.     -BB       Special Tests/Palpation    Special Tests/Palpation  Lumbar/SI  -BB       Lumbar/SI Special Tests    Trendelenburg Test (Gluteus Medius Weakness)  Bilateral:;Positive  " -BB    Slump Test (Neural Tension)  Left:;Positive  -BB    SI Compression Test (SI Dysfunction)  Right:;Positive  -BB    SI Distraction Test (SI Dysfunction)  Negative  -BB       Lumbosacral Palpation    Lumbosacral Palpation?  Yes  -BB    SI  Bilateral:;Tender  -BB    Lumbosacral Segment  -- tender L4-5-S1 and all segments are hypermobile   -BB    Piriformis  Right:;Tender;Guarded/taut  -BB    Gluteus Loy  Right:;Tender  -BB    Quadratus Lumborum  Right:;Tender  -BB       General ROM    Head/Neck/Trunk  Trunk Extension;Trunk Flexion;Trunk Rt Lateral Flexion;Trunk Lt Lateral Flexion;Trunk Lt Rotation;Trunk Rt Rotation  -BB       Head/Neck/Trunk    Trunk Extension AROM  30 degrees with pain   -BB    Trunk Flexion AROM  tips to mid shank with pain and decreased lumbar curve   -BB    Trunk Lt Lateral Flexion AROM  WNL with pull   -BB    Trunk Rt Lateral Flexion AROM  WNL with pain   -BB    Trunk Lt Rotation AROM  WNL   -BB    Trunk Rt Rotation AROM  WNL   -BB       MMT (Manual Muscle Testing)    General MMT Comments  Bilateral hip flex: and knees grossly 5/5. hip abd 4+/5 grossly   -BB       Sensation    Sensation WNL?  WNL  -BB    Light Touch  No apparent deficits  -BB       Transfers    Comment (Transfers)  Independent in all   -BB       Gait/Stairs Assessment/Training    Comment (Gait/Stairs)  no significant antalgics noted   -BB      User Key  (r) = Recorded By, (t) = Taken By, (c) = Cosigned By    Initials Name Provider Type    Iris Shearer, PT Physical Therapist                      Therapy Education  Education Details: ice to aide with soreness  Given: Symptoms/condition management  Program: New  How Provided: Verbal  Provided to: Patient  Level of Understanding: Verbalized     PT OP Goals     Row Name 02/14/19 5227          PT Short Term Goals    STG Date to Achieve  03/07/19  -BB     STG 1  Independent in HEP   -BB     STG 2  No increased pain with trunk AROM   -BB     STG 3  Maintain or be indepenent  in self correction of SI joint   -BB     STG 4  Hip abduction SL 5/5 bilaterally   -BB        Time Calculation    PT Goal Re-Cert Due Date  03/07/19  -BB       User Key  (r) = Recorded By, (t) = Taken By, (c) = Cosigned By    Initials Name Provider Type    Iris Shearer, PT Physical Therapist          PT Assessment/Plan     Row Name 02/14/19 2437          PT Assessment    Functional Limitations  Impaired gait;Limitation in home management;Limitations in functional capacity and performance  -BB     Impairments  Impaired muscle endurance;Muscle strength;Pain;Poor body mechanics;Range of motion;Posture  -BB     Assessment Comments  Patient presents with low back pain with intermittent right radiculopathy, strength is WFL with weakness of hip abductors grossly, right anterior rotation of ASIS noted that improved with MET and manual joint oscillations of SI, trunk ROM that follows a right side closing pattern and upon joint mobility has hypermobility. Patient could benefit from significant core stabilization, glute and hip strengthening for improved stability of lumbar spine.   -BB     Rehab Potential  Good  -BB     Patient/caregiver participated in establishment of treatment plan and goals  Yes  -BB     Patient would benefit from skilled therapy intervention  Yes  -BB        PT Plan    PT Frequency  2x/week  -BB     Predicted Duration of Therapy Intervention (Therapy Eval)  4 weeks   -BB     Planned CPT's?  PT EVAL LOW COMPLEXITY: 89993;PT RE-EVAL: 75978;PT THER PROC EA 15 MIN: 52338;PT THER ACT EA 15 MIN: 33793;PT MANUAL THERAPY EA 15 MIN: 01336;PT ELECTRICAL STIM UNATTEND: ;PT THER SUPP EA 15 MIN  -BB     PT Plan Comments  core stabilization, monitor and correct SI alignment, no lumbar joint mobes, stretching, strengthening of hips   -BB       User Key  (r) = Recorded By, (t) = Taken By, (c) = Cosigned By    Initials Name Provider Type    Iris Shearer, PT Physical Therapist            Exercises     Row  Name 02/14/19 1439             Subjective Comments    Subjective Comments  See patient history   -BB         Subjective Pain    Able to rate subjective pain?  yes  -BB      Pre-Treatment Pain Level  9  -BB      Post-Treatment Pain Level  2  -BB         Exercise 1    Exercise Name 1  correction for right anterior rotation MET and SI jt oscillations   -BB        User Key  (r) = Recorded By, (t) = Taken By, (c) = Cosigned By    Initials Name Provider Type    Iris Shearer, PT Physical Therapist                        Outcome Measure Options: Modifed Owestry  Modified Oswestry  Modified Oswestry Score/Comments: 44%      Time Calculation:         Start Time: 1439  Stop Time: 1505  Time Calculation (min): 26 min     Therapy Charges for Today     Code Description Service Date Service Provider Modifiers Qty    83795931106 HC PT EVAL LOW COMPLEXITY 2 2/14/2019 Iris Carroll, PT GP 1          PT G-Codes  Outcome Measure Options: Modifed Owestry  Modified Oswestry Score/Comments: 44%         Iris Carroll, PT  2/14/2019

## 2019-02-16 NOTE — PROGRESS NOTES
"Subjective   Marleny Conn is a 23 y.o. female.     Marleny is a 22 yo obese female who presents with several complaints. She has had a bilateral salpingectomy in August 2018. Since her last visit she states that \"I just don't feel right.\" She has had a 60 lb weight gain. Notable black facial hair that is enough that she has to shave. She is also experiencing fatigue, vaginal dryness & decreased libido. Her periods are regular but very heavy. She also states that around her vaginal introitus is irritated.  Pap is not due until July 2019        The following portions of the patient's history were reviewed and updated as appropriate: allergies, current medications, past family history, past medical history, past social history, past surgical history and problem list.    Review of Systems   Constitutional: Positive for fatigue and unexpected weight change.   HENT: Negative.    Eyes: Negative.    Respiratory: Negative.    Cardiovascular: Negative.    Gastrointestinal: Negative.    Endocrine: Negative.    Genitourinary:        Decreased libido, vaginal irritation, heavy periods   Musculoskeletal: Negative.    Skin: Negative.    Allergic/Immunologic: Negative.    Neurological: Negative.    Hematological: Negative.    Psychiatric/Behavioral: Negative.        Objective   Physical Exam   Constitutional: She is oriented to person, place, and time. She appears well-developed and well-nourished.   HENT:   Head: Normocephalic and atraumatic.   Eyes: EOM are normal. Pupils are equal, round, and reactive to light.   Neck: Normal range of motion. Neck supple.   Pulmonary/Chest: Effort normal.   Abdominal: Soft.   Genitourinary: Vagina normal.   Musculoskeletal: Normal range of motion.   Neurological: She is alert and oriented to person, place, and time.   Skin: Skin is warm and dry.   Psychiatric: She has a normal mood and affect. Her behavior is normal. Judgment and thought content normal.       Assessment/Plan   Diagnoses " and all orders for this visit:    Hirsutism  -     Estradiol  -     Follicle Stimulating Hormone  -     Insulin, Total  -     Luteinizing Hormone  -     Progesterone  -     Sex Horm Binding Globulin  -     Testosterone, F Eqlib & T LC / MS  -     TSH    Abnormal weight gain  -     Estradiol  -     Follicle Stimulating Hormone  -     Insulin, Total  -     Luteinizing Hormone  -     Progesterone  -     Sex Horm Binding Globulin  -     Testosterone, F Eqlib & T LC / MS  -     TSH    Vaginal dryness  -     Luteinizing Hormone  -     Progesterone  -     Sex Horm Binding Globulin  -     Testosterone, F Eqlib & T LC / MS  -     TSH    Decreased libido  -     Estradiol  -     Follicle Stimulating Hormone  -     Insulin, Total  -     Luteinizing Hormone  -     Progesterone  -     Sex Horm Binding Globulin  -     Testosterone, F Eqlib & T LC / MS  -     TSH    Vaginal pain    Other fatigue  -     Estradiol  -     Follicle Stimulating Hormone  -     Insulin, Total  -     Luteinizing Hormone  -     Progesterone  -     Sex Horm Binding Globulin  -     Testosterone, F Eqlib & T LC / MS  -     TSH    Menorrhagia with regular cycle    Morbidly obese (CMS/HCC)    Other orders  -     buPROPion SR (WELLBUTRIN SR) 150 MG 12 hr tablet; Take 1 tablet by mouth Daily.       Will stop Lexapro & start Wellbutrin 150 mg daily, may increase to 300 mg daily after 2 weeks if needed for depression, energy, & weight loss  Will F/U in the office in 1-2 weeks

## 2019-02-17 LAB
TESTOST FREE MFR SERPL: 1.32 % (ref 0.5–2.8)
TESTOST FREE SERPL-MCNC: 0.59 NG/DL (ref 0.1–0.85)
TESTOST SERPL-MCNC: 45 NG/DL (ref 10–55)

## 2019-02-21 ENCOUNTER — HOSPITAL ENCOUNTER (OUTPATIENT)
Dept: PHYSICAL THERAPY | Facility: HOSPITAL | Age: 24
Setting detail: THERAPIES SERIES
Discharge: HOME | End: 2019-02-21

## 2019-02-21 DIAGNOSIS — M54.5 RIGHT LOW BACK PAIN, UNSPECIFIED CHRONICITY, WITH SCIATICA PRESENCE UNSPECIFIED: Primary | ICD-10-CM

## 2019-02-21 DIAGNOSIS — M43.16 SPONDYLOLISTHESIS OF LUMBAR REGION: ICD-10-CM

## 2019-02-21 PROCEDURE — 97110 THERAPEUTIC EXERCISES: CPT

## 2019-02-21 NOTE — THERAPY TREATMENT NOTE
Outpatient Physical Therapy Ortho Treatment Note  [facility]     Patient Name: Marleny Conn  : 1995  MRN: [id number]  Today's Date: 2019      Visit Date: 2019     Sub imp 0%  Visit  (Eval + 8)  RE 3/8/19  MD TBD    Visit Dx:    ICD-10-CM ICD-9-CM   1. Right low back pain, unspecified chronicity, with sciatica presence unspecified M54.5 724.2   2. Spondylolisthesis of lumbar region M43.16 738.4       Patient Active Problem List   Diagnosis   • Rh negative state in antepartum period   • Rubella non-immune status, antepartum   • Back pain affecting pregnancy in third trimester   • Term pregnancy   • Sterilization consult   • Morbidly obese (CMS/HCC)        Past Medical History:   Diagnosis Date   • Acid reflux    • Allergic rhinitis    • Anxiety    • Anxiety    • Bronchitis     probable   • Common cold    • Conjunctivitis    • Dysfunction of eustachian tube    • Examination of eyes and vision     general; NORMAL   • Gastroenteritis    • Headache    • Hemorrhoids     likely   • Infection of skin and subcutaneous tissue    • Migraine    • Nonvenomous insect bite    • Open wound of lower limb    • Pharyngitis    • Rhinitis    • Tick bite    • URI (upper respiratory infection)         Past Surgical History:   Procedure Laterality Date   • SALPINGECTOMY Bilateral 2018    Procedure: SALPINGECTOMY LAPAROSCOPIC;  Surgeon: FridayElizabeth MD;  Location: [facility];  Service: Obstetrics/Gynecology   • WISDOM TOOTH EXTRACTION         PT Ortho     Row Name 19 1000       Posture/Observations    Posture/Observations Comments  Right anterior Innominate, negative shotgun  (Pended)   -KATHY      User Key  (r) = Recorded By, (t) = Taken By, (c) = Cosigned By    Initials Name Provider Type    Ward Hancock, ATC                       PT Assessment/Plan     Row Name 19 1038          PT Assessment    Assessment Comments  Improved pain post SI correction. Difficulty to  "maintain alignment. Had to recorrect post ex. Ex sheets given for HEP, demo'ed appropriately. Pt instructed in SI R ant self mobe.   (Pended)   -KATHY        PT Plan    PT Frequency  2x/week  (Pended)   -     Predicted Duration of Therapy Intervention (Therapy Eval)  4 weeks  (Pended)   -     PT Plan Comments  MET, No lumbar joint mobes. Core Stab. Bridge, Resisted Hip Abd next.   (Pended)   -       User Key  (r) = Recorded By, (t) = Taken By, (c) = Cosigned By    Initials Name Provider Type    Ward Hancock, [facility]           Modalities     Row Name 02/21/19 1000             Moist Heat    MH Applied  Yes  (Pended)   -      Location  LB  (Pended)   -KATHY      Rx Minutes  15 mins  (Pended)   -      MH S/P Rx  Yes  (Pended)   -        User Key  (r) = Recorded By, (t) = Taken By, (c) = Cosigned By    Initials Name Provider Type    Ward Hancock, [facility]           Exercises     Row Name 02/21/19 1000             Subjective Pain    Pre-Treatment Pain Level  6  (Pended)   -KATHY         Exercise 1    Exercise Name 1  MET R Anterior  (Pended)   -KATHY         Exercise 2    Exercise Name 2  supine piriformis stretch  (Pended)   -KATHY      Sets 2  3  (Pended)   -KATHY      Time 2  30\"  (Pended)   -KATHY         Exercise 3    Exercise Name 3  SKC  (Pended)   -KATHY      Sets 3  3  (Pended)   -KATHY      Time 3  30\"  (Pended)   -KATHY         Exercise 4    Exercise Name 4  LTR  (Pended)   -KATHY      Sets 4  1  (Pended)   -KATHY      Reps 4  15  (Pended)   -KATHY         Exercise 5    Exercise Name 5  Clamshell  (Pended)   -KATHY      Sets 5  2  (Pended)   -KATHY      Reps 5  10  (Pended)   -KATHY         Exercise 6    Exercise Name 6  Trans Ab w/ Add  (Pended)   -KATHY      Sets 6  2  (Pended)   -KATHY      Reps 6  10  (Pended)   -KATHY      Time 6  10  (Pended)   -KATHY         Exercise 7    Exercise Name 7  Standing Ham stretch  (Pended)   -KATHY      Sets 7  3  (Pended)   -KATHY      Time 7  30\"  (Pended)   -KATHY         Exercise 8    Exercise " Name 8  Recheck Alignment with Recorrrect  (Pended)   -KATHY         Exercise 9    Exercise Name 9  MHP  (Pended)   -KATHY      Time 9  15'  (Pended)   -KATHY        User Key  (r) = Recorded By, (t) = Taken By, (c) = Cosigned By    Initials Name Provider Type    Ward Hancock ATC                          PT OP Goals     Row Name 02/21/19 1000          PT Short Term Goals    STG Date to Achieve  03/07/19  (Pended)   -KATHY     STG 1  Independent in HEP   (Pended)   -KATHY     STG 2  No increased pain with trunk AROM   (Pended)   -KATHY     STG 3  Maintain or be indepenent in self correction of SI joint   (Pended)   -KATHY     STG 4  Hip abduction SL 5/5 bilaterally   (Pended)   -KATHY       User Key  (r) = Recorded By, (t) = Taken By, (c) = Cosigned By    Initials Name Provider Type    Ward Hancock ATC           Therapy Education  Given: (P) HEP  Program: (P) Reinforced, New  How Provided: (P) Verbal, Written  Provided to: (P) Patient  Level of Understanding: (P) Verbalized, Demonstrated              Time Calculation:   Start Time: (P) 1013  Stop Time: (P) 1056  Time Calculation (min): (P) 43 min  Total Timed Code Minutes- PT: (P) 28 minute(s)  Therapy Suggested Charges     Code   Minutes Charges    None           Therapy Charges for Today     Code Description Service Date Service Provider Modifiers Qty    50656730411 HC PT THER SUPP EA 15 MIN 2/21/2019 Ward Adrian ATC  1    34754639336 HC PT THER PROC EA 15 MIN 2/21/2019 Ward Adrian ATC  2                    Ward Adrian ATC  2/21/2019

## 2019-02-26 ENCOUNTER — OFFICE VISIT (OUTPATIENT)
Dept: OBSTETRICS AND GYNECOLOGY | Facility: CLINIC | Age: 24
End: 2019-02-26

## 2019-02-26 VITALS
BODY MASS INDEX: 42.51 KG/M2 | DIASTOLIC BLOOD PRESSURE: 64 MMHG | SYSTOLIC BLOOD PRESSURE: 108 MMHG | WEIGHT: 249 LBS | HEIGHT: 64 IN

## 2019-02-26 DIAGNOSIS — Z09 FOLLOW UP: Primary | ICD-10-CM

## 2019-02-26 PROCEDURE — 99213 OFFICE O/P EST LOW 20 MIN: CPT | Performed by: ADVANCED PRACTICE MIDWIFE

## 2019-02-28 ENCOUNTER — HOSPITAL ENCOUNTER (OUTPATIENT)
Dept: PHYSICAL THERAPY | Facility: HOSPITAL | Age: 24
Setting detail: THERAPIES SERIES
Discharge: HOME | End: 2019-02-28

## 2019-02-28 DIAGNOSIS — M43.16 SPONDYLOLISTHESIS OF LUMBAR REGION: ICD-10-CM

## 2019-02-28 DIAGNOSIS — M54.5 RIGHT LOW BACK PAIN, UNSPECIFIED CHRONICITY, WITH SCIATICA PRESENCE UNSPECIFIED: Primary | ICD-10-CM

## 2019-02-28 PROCEDURE — 97110 THERAPEUTIC EXERCISES: CPT

## 2019-03-03 NOTE — PROGRESS NOTES
"Subjective   Marleny Conn is a 23 y.o. female.     Marleny is here for follow up on medication changes & to review labs. She states that \"I feel so much better & I have lost 7 pounds.\" LMP 2-26-19         The following portions of the patient's history were reviewed and updated as appropriate: allergies, current medications, past family history, past medical history, past social history, past surgical history and problem list.    Review of Systems   Constitutional: Negative.    HENT: Negative.    Eyes: Negative.    Respiratory: Negative.    Cardiovascular: Negative.    Gastrointestinal: Negative.    Endocrine: Negative.    Genitourinary: Positive for menstrual problem.   Musculoskeletal: Negative.    Skin: Negative.    Allergic/Immunologic: Negative.    Neurological: Negative.    Hematological: Negative.    Psychiatric/Behavioral: Negative.        Objective   Physical Exam   Constitutional: She is oriented to person, place, and time. She appears well-developed and well-nourished.   Eyes: Conjunctivae and EOM are normal. Pupils are equal, round, and reactive to light.   Neck: Normal range of motion.   Pulmonary/Chest: Effort normal.   Musculoskeletal: Normal range of motion.   Neurological: She is alert and oriented to person, place, and time.   Skin: Skin is warm and dry.   Psychiatric: She has a normal mood and affect. Her behavior is normal. Judgment and thought content normal.         Assessment/Plan   1. Hyperinsulinemia    2. Metformin 500 mg daily  3. Continue with Wellbutrin, may increase to 300 mg daily  4. RTC for pap           "

## 2019-08-28 ENCOUNTER — HOSPITAL ENCOUNTER (EMERGENCY)
Facility: HOSPITAL | Age: 24
Discharge: HOME | End: 2019-08-28
Attending: EMERGENCY MEDICINE | Admitting: EMERGENCY MEDICINE

## 2019-08-28 ENCOUNTER — APPOINTMENT (OUTPATIENT)
Dept: GENERAL RADIOLOGY | Facility: HOSPITAL | Age: 24
End: 2019-08-28

## 2019-08-28 VITALS
RESPIRATION RATE: 16 BRPM | WEIGHT: 246 LBS | DIASTOLIC BLOOD PRESSURE: 57 MMHG | TEMPERATURE: 98.8 F | OXYGEN SATURATION: 98 % | SYSTOLIC BLOOD PRESSURE: 101 MMHG | HEIGHT: 64 IN | HEART RATE: 74 BPM | BODY MASS INDEX: 42 KG/M2

## 2019-08-28 DIAGNOSIS — R09.1 PLEURISY: Primary | ICD-10-CM

## 2019-08-28 DIAGNOSIS — F45.8 HYPERVENTILATION SYNDROME: ICD-10-CM

## 2019-08-28 LAB
ALBUMIN SERPL-MCNC: 4.6 G/DL (ref 3.5–5.2)
ALBUMIN/GLOB SERPL: 1.4 G/DL
ALP SERPL-CCNC: 79 U/L (ref 39–117)
ALT SERPL W P-5'-P-CCNC: 19 U/L (ref 1–33)
ANION GAP SERPL CALCULATED.3IONS-SCNC: 13 MMOL/L (ref 5–15)
AST SERPL-CCNC: 18 U/L (ref 1–32)
BASOPHILS # BLD AUTO: 0.07 10*3/MM3 (ref 0–0.2)
BASOPHILS NFR BLD AUTO: 0.5 % (ref 0–1.5)
BILIRUB SERPL-MCNC: 0.4 MG/DL (ref 0.2–1.2)
BUN BLD-MCNC: 10 MG/DL (ref 6–20)
BUN/CREAT SERPL: 13.9 (ref 7–25)
CALCIUM SPEC-SCNC: 9.3 MG/DL (ref 8.6–10.5)
CHLORIDE SERPL-SCNC: 102 MMOL/L (ref 98–107)
CO2 SERPL-SCNC: 24 MMOL/L (ref 22–29)
CREAT BLD-MCNC: 0.72 MG/DL (ref 0.57–1)
DEPRECATED RDW RBC AUTO: 41.1 FL (ref 37–54)
EOSINOPHIL # BLD AUTO: 0.43 10*3/MM3 (ref 0–0.4)
EOSINOPHIL NFR BLD AUTO: 3.1 % (ref 0.3–6.2)
ERYTHROCYTE [DISTWIDTH] IN BLOOD BY AUTOMATED COUNT: 12.7 % (ref 12.3–15.4)
GFR SERPL CREATININE-BSD FRML MDRD: 98 ML/MIN/1.73
GLOBULIN UR ELPH-MCNC: 3.4 GM/DL
GLUCOSE BLD-MCNC: 85 MG/DL (ref 65–99)
HCT VFR BLD AUTO: 42.4 % (ref 34–46.6)
HGB BLD-MCNC: 14.1 G/DL (ref 12–15.9)
HOLD SPECIMEN: NORMAL
HOLD SPECIMEN: NORMAL
IMM GRANULOCYTES # BLD AUTO: 0.06 10*3/MM3 (ref 0–0.05)
IMM GRANULOCYTES NFR BLD AUTO: 0.4 % (ref 0–0.5)
LYMPHOCYTES # BLD AUTO: 4.5 10*3/MM3 (ref 0.7–3.1)
LYMPHOCYTES NFR BLD AUTO: 32.9 % (ref 19.6–45.3)
MCH RBC QN AUTO: 29.3 PG (ref 26.6–33)
MCHC RBC AUTO-ENTMCNC: 33.3 G/DL (ref 31.5–35.7)
MCV RBC AUTO: 88 FL (ref 79–97)
MONOCYTES # BLD AUTO: 0.53 10*3/MM3 (ref 0.1–0.9)
MONOCYTES NFR BLD AUTO: 3.9 % (ref 5–12)
NEUTROPHILS # BLD AUTO: 8.09 10*3/MM3 (ref 1.7–7)
NEUTROPHILS NFR BLD AUTO: 59.2 % (ref 42.7–76)
NRBC BLD AUTO-RTO: 0 /100 WBC (ref 0–0.2)
PLATELET # BLD AUTO: 259 10*3/MM3 (ref 140–450)
PMV BLD AUTO: 10.7 FL (ref 6–12)
POTASSIUM BLD-SCNC: 3.9 MMOL/L (ref 3.5–5.2)
PROT SERPL-MCNC: 8 G/DL (ref 6–8.5)
RBC # BLD AUTO: 4.82 10*6/MM3 (ref 3.77–5.28)
SODIUM BLD-SCNC: 139 MMOL/L (ref 136–145)
TROPONIN T SERPL-MCNC: <0.01 NG/ML (ref 0–0.03)
WBC NRBC COR # BLD: 13.68 10*3/MM3 (ref 3.4–10.8)
WHOLE BLOOD HOLD SPECIMEN: NORMAL
WHOLE BLOOD HOLD SPECIMEN: NORMAL

## 2019-08-28 PROCEDURE — 93005 ELECTROCARDIOGRAM TRACING: CPT | Performed by: EMERGENCY MEDICINE

## 2019-08-28 PROCEDURE — 80053 COMPREHEN METABOLIC PANEL: CPT

## 2019-08-28 PROCEDURE — 93005 ELECTROCARDIOGRAM TRACING: CPT

## 2019-08-28 PROCEDURE — 84484 ASSAY OF TROPONIN QUANT: CPT

## 2019-08-28 PROCEDURE — 85025 COMPLETE CBC W/AUTO DIFF WBC: CPT

## 2019-08-28 PROCEDURE — 93010 ELECTROCARDIOGRAM REPORT: CPT | Performed by: INTERNAL MEDICINE

## 2019-08-28 PROCEDURE — 99284 EMERGENCY DEPT VISIT MOD MDM: CPT

## 2019-08-28 PROCEDURE — 71046 X-RAY EXAM CHEST 2 VIEWS: CPT

## 2019-08-28 RX ORDER — SODIUM CHLORIDE 0.9 % (FLUSH) 0.9 %
10 SYRINGE (ML) INJECTION AS NEEDED
Status: DISCONTINUED | OUTPATIENT
Start: 2019-08-28 | End: 2019-08-29 | Stop reason: HOSPADM

## 2019-08-28 RX ORDER — PREDNISONE 20 MG/1
20 TABLET ORAL ONCE
Status: DISCONTINUED | OUTPATIENT
Start: 2019-08-28 | End: 2019-08-29 | Stop reason: HOSPADM

## 2019-08-28 RX ORDER — HYDROXYZINE PAMOATE 25 MG/1
25 CAPSULE ORAL 3 TIMES DAILY PRN
Qty: 20 CAPSULE | Refills: 0 | Status: SHIPPED | OUTPATIENT
Start: 2019-08-28 | End: 2019-10-09

## 2019-08-28 RX ORDER — ALPRAZOLAM 0.25 MG/1
0.25 TABLET ORAL ONCE
Status: COMPLETED | OUTPATIENT
Start: 2019-08-28 | End: 2019-08-28

## 2019-08-28 RX ORDER — PREDNISONE 20 MG/1
20 TABLET ORAL DAILY
Qty: 5 TABLET | Refills: 0 | Status: SHIPPED | OUTPATIENT
Start: 2019-08-28 | End: 2019-10-09

## 2019-08-28 RX ADMIN — ALPRAZOLAM 0.25 MG: 0.25 TABLET ORAL at 21:29

## 2019-09-18 ENCOUNTER — OFFICE VISIT (OUTPATIENT)
Dept: FAMILY MEDICINE CLINIC | Facility: CLINIC | Age: 24
End: 2019-09-18

## 2019-09-18 VITALS
DIASTOLIC BLOOD PRESSURE: 56 MMHG | BODY MASS INDEX: 41.07 KG/M2 | TEMPERATURE: 97.4 F | OXYGEN SATURATION: 98 % | SYSTOLIC BLOOD PRESSURE: 98 MMHG | HEIGHT: 64 IN | HEART RATE: 84 BPM | WEIGHT: 240.6 LBS

## 2019-09-18 DIAGNOSIS — Z83.79 FAMILY HISTORY OF CROHN'S DISEASE: ICD-10-CM

## 2019-09-18 DIAGNOSIS — F41.9 ANXIETY: Primary | ICD-10-CM

## 2019-09-18 DIAGNOSIS — R06.83 SNORING: ICD-10-CM

## 2019-09-18 DIAGNOSIS — R10.33 UMBILICAL PAIN: ICD-10-CM

## 2019-09-18 PROCEDURE — 99204 OFFICE O/P NEW MOD 45 MIN: CPT | Performed by: FAMILY MEDICINE

## 2019-09-18 NOTE — PATIENT INSTRUCTIONS

## 2019-09-18 NOTE — PROGRESS NOTES
" Subjective   Marleny Conn is a 24 y.o. female.     History of Present Illness     Wants gi referral.  Cramps a lot, periumbilical pain, she was told she probably had umbilical hernia.  Family history of crohns.   Worried may have crohns.  Denies blood in stool.     Drinks sprite zero and water    5 times a day, sob and chest discomfort.     About a month or so.      Snores, and witnessed apneas    Review of Systems   Constitutional: Negative for chills, fatigue and fever.   HENT: Negative for congestion, ear discharge, ear pain, facial swelling, hearing loss, postnasal drip, rhinorrhea, sinus pressure, sore throat, trouble swallowing and voice change.    Eyes: Negative for discharge, redness and visual disturbance.   Respiratory: Positive for chest tightness. Negative for cough, shortness of breath and wheezing.    Cardiovascular: Negative for chest pain and palpitations.   Gastrointestinal: Positive for abdominal pain. Negative for blood in stool, constipation, diarrhea, nausea and vomiting.   Endocrine: Negative for polydipsia and polyuria.   Genitourinary: Negative for dysuria, flank pain, hematuria and urgency.   Musculoskeletal: Negative for arthralgias, back pain, joint swelling and myalgias.   Skin: Negative for rash.   Neurological: Negative for dizziness, weakness, numbness and headaches.   Hematological: Negative for adenopathy.   Psychiatric/Behavioral: Negative for confusion and sleep disturbance. The patient is not nervous/anxious.            BP 98/56 (BP Location: Left arm, Patient Position: Sitting, Cuff Size: Adult)   Pulse 84   Temp 97.4 °F (36.3 °C) (Temporal)   Ht 162.6 cm (64.02\")   Wt 109 kg (240 lb 9.6 oz)   SpO2 98%   BMI 41.28 kg/m²       Objective     Physical Exam   Constitutional: She is oriented to person, place, and time. She appears well-developed and well-nourished.   HENT:   Head: Normocephalic and atraumatic.   Right Ear: External ear normal.   Left Ear: External ear " normal.   Nose: Nose normal.   Narrow airway and tonsils   Eyes: Conjunctivae and EOM are normal. Pupils are equal, round, and reactive to light.   Neck: Normal range of motion. Neck supple.   Cardiovascular: Normal rate, regular rhythm and normal heart sounds. Exam reveals no gallop and no friction rub.   No murmur heard.  Pulmonary/Chest: Effort normal and breath sounds normal.   Abdominal: Soft. Bowel sounds are normal. She exhibits no distension. There is no tenderness. There is no rebound and no guarding.   Musculoskeletal: Normal range of motion. She exhibits no edema or deformity.   Neurological: She is alert and oriented to person, place, and time. No cranial nerve deficit.   Skin: Skin is warm and dry. No rash noted. No erythema.   Psychiatric: She has a normal mood and affect. Her behavior is normal. Judgment and thought content normal.   Nursing note and vitals reviewed.          PAST MEDICAL HISTORY     Past Medical History:   Diagnosis Date   • Acid reflux    • Allergic rhinitis    • Anxiety    • Anxiety    • Bronchitis     probable   • Common cold    • Conjunctivitis    • Dysfunction of eustachian tube    • Examination of eyes and vision     general; NORMAL   • Gastroenteritis    • Headache    • Hemorrhoids     likely   • Infection of skin and subcutaneous tissue    • Migraine    • Nonvenomous insect bite    • Open wound of lower limb    • Pharyngitis    • Rhinitis    • Tick bite    • URI (upper respiratory infection)       PAST SURGICAL HISTORY     Past Surgical History:   Procedure Laterality Date   • SALPINGECTOMY Bilateral 8/7/2018    Procedure: SALPINGECTOMY LAPAROSCOPIC;  Surgeon: Friday, Elizabeth SMITH MD;  Location: [facility];  Service: Obstetrics/Gynecology   • WISDOM TOOTH EXTRACTION        SOCIAL HISTORY     Social History     Socioeconomic History   • Marital status: Single     Spouse name: Not on file   • Number of children: Not on file   • Years of education: Not on file   • Highest education  level: Not on file   Tobacco Use   • Smoking status: Former Smoker     Packs/day: 0.25     Years: 10.00     Pack years: 2.50     Types: Cigarettes     Last attempt to quit: 2017     Years since quittin.6   • Smokeless tobacco: Never Used   Substance and Sexual Activity   • Alcohol use: No   • Drug use: No   • Sexual activity: Yes     Partners: Male     Birth control/protection: Pill      ALLERGIES   Patient has no known allergies.   MEDICATIONS     Current Outpatient Medications   Medication Sig Dispense Refill   • buPROPion SR (WELLBUTRIN SR) 150 MG 12 hr tablet Take 1 tablet by mouth Daily. 30 tablet 2   • hydrOXYzine pamoate (VISTARIL) 25 MG capsule Take 1 capsule by mouth 3 (Three) Times a Day As Needed for Anxiety. 20 capsule 0   • metFORMIN (GLUCOPHAGE) 500 MG tablet Take 1 tablet by mouth Daily With Breakfast. 30 tablet 11   • predniSONE (DELTASONE) 20 MG tablet Take 1 tablet by mouth Daily. 5 tablet 0   • sertraline (ZOLOFT) 50 MG tablet Take 0.5-1 tablets by mouth Daily. 30 tablet 11     No current facility-administered medications for this visit.         The following portions of the patient's history were reviewed and updated as appropriate: allergies, current medications, past family history, past medical history, past social history, past surgical history and problem list.        Assessment/Plan   Marleny was seen today for follow-up.    Diagnoses and all orders for this visit:    Anxiety    Snoring  -     Ambulatory Referral to Sleep Medicine    Umbilical pain  -     Ambulatory Referral to Gastroenterology    Family history of Crohn's disease  -     Ambulatory Referral to Gastroenterology    Other orders  -     sertraline (ZOLOFT) 50 MG tablet; Take 0.5-1 tablets by mouth Daily.    I think anxiety related.  I think sleep apnea    She had good response zoloft when young she says.   Start 25mg     Referral gi.                   No Follow-up on file.                  This document has been  electronically signed by Esteban Sands MD on September 18, 2019 6:00 PM

## 2019-10-09 ENCOUNTER — CONSULT (OUTPATIENT)
Dept: SLEEP MEDICINE | Facility: HOSPITAL | Age: 24
End: 2019-10-09

## 2019-10-09 VITALS
OXYGEN SATURATION: 98 % | DIASTOLIC BLOOD PRESSURE: 66 MMHG | HEIGHT: 64 IN | BODY MASS INDEX: 40.74 KG/M2 | SYSTOLIC BLOOD PRESSURE: 124 MMHG | HEART RATE: 50 BPM | WEIGHT: 238.6 LBS

## 2019-10-09 DIAGNOSIS — G47.33 OBSTRUCTIVE SLEEP APNEA, ADULT: Primary | ICD-10-CM

## 2019-10-09 PROCEDURE — 99203 OFFICE O/P NEW LOW 30 MIN: CPT | Performed by: INTERNAL MEDICINE

## 2019-10-09 RX ORDER — CYCLOBENZAPRINE HCL 5 MG
5 TABLET ORAL 3 TIMES DAILY PRN
COMMUNITY
End: 2019-11-04

## 2019-10-09 NOTE — PROGRESS NOTES
New Patient Sleep Medicine Consultation    Encounter Date: 10/9/2019         Patient's PCP: Esteban Sands MD  Referring provider: Esteban Sands MD  Reason for consultation chief complaint: snoring, excessive daytime sleepiness and unrefreshing sleep    Marleny Conn is a 24 y.o. female whose bedtime is ~ 2100. She  falls asleep after 30-60 minutes, and is up every hour. She wakes up ~ 0730. She endorses 4-5 hours of sleep. She drinks 1 cups of coffee, 0 teas, and 0-3 sodas per day. She drinks 0 alcoholic beverages per week.    Marleny Conn admits to snoring, unrestful sleep, working night shift or rotataing shifts, excessive daytime sleepiness, morning headaches, stop breathing during sleep, irritability, sleeping less than 6 hours per night, Disturbed or restless sleep, memory loss, choking duing sleep, Up to the bathroom at night, night sweats, difficulty falling asleep, difficulty staying asleep and takes medicine to help go to sleep. She denies cataplexy, sleep paralysis, or hypnagogic hallucinations.      She is a smoker. She does not take sedatives or hypnotics. She has some sleepiness with driving. She naps daily.     Past Medical History:   Diagnosis Date   • Acid reflux    • Allergic rhinitis    • Anxiety    • Anxiety    • Bronchitis     probable   • Common cold    • Conjunctivitis    • Dysfunction of eustachian tube    • Examination of eyes and vision     general; NORMAL   • Gastroenteritis    • Headache    • Hemorrhoids     likely   • Infection of skin and subcutaneous tissue    • Migraine    • Nonvenomous insect bite    • Open wound of lower limb    • Pharyngitis    • Rhinitis    • Tick bite    • URI (upper respiratory infection)      Social History     Socioeconomic History   • Marital status: Single     Spouse name: Not on file   • Number of children: Not on file   • Years of education: Not on file   • Highest education level: Not on file   Tobacco Use   • Smoking status: Former  "Smoker     Packs/day: 0.25     Years: 10.00     Pack years: 2.50     Types: Cigarettes     Last attempt to quit: 2017     Years since quittin.7   • Smokeless tobacco: Never Used   Substance and Sexual Activity   • Alcohol use: No   • Drug use: No   • Sexual activity: Yes     Partners: Male     Birth control/protection: Pill     Family History   Problem Relation Age of Onset   • Hypertension Brother    • Hypertension Paternal Grandmother    • Hypertension Maternal Grandmother    • Diabetes Maternal Grandmother    • No Known Problems Father    • No Known Problems Mother    • Heart attack Paternal Grandfather    • Hypertension Maternal Grandfather    • Sleep apnea Maternal Grandfather    single, dating  2 kids - 1 year old in her own bed in her own room  2 brothers and 0 sisters  Other family history + for: HTN, heart failure, DM, Crohn's  Smoking history: smoked 1 ppds from age 12 until present, and trying to quit  FH of sleep disorders: grandpa    [place] - 5    Review of Systems:  Constitutional: negative  Eyes: negative  Ears, nose, mouth, throat, and face: negative  Respiratory: negative  Cardiovascular: negative  Gastrointestinal: negative  Genitourinary:negative  Integument/breast: negative  Hematologic/lymphatic: negative  Musculoskeletal:positive for back pain  Neurological: negative  Behavioral/Psych: negative  Endocrine: negative  Allergic/Immunologic: negative Patient advised to discuss any positive ROS with PCP.      Vitals:    10/09/19 1516   BP: 124/66   Pulse: 50   SpO2: 98%           10/09/19  1516   Weight: 108 kg (238 lb 9.6 oz)       Body mass index is 40.94 kg/m². Patient's Body mass index is 40.94 kg/m². BMI is above normal parameters. Recommendations include: referral to primary care.  Neck circumference: 15.5\"          General: Alert. Cooperative. Well developed. No acute distress.             Head:  Normocephalic. Symmetrical. Atraumatic.              Eyes: Sclera clear. No icterus. " PERRLA. Normal EOM.             Ears: No deformities. Normal hearing.             Nose: No septal deviation. No drainage.          Throat: No oral lesions. No thrush. Moist mucous membranes. Trachea midline    Tongue is enlarged    Dentition is good       Pharynx: Posterior pharyngeal pillars are narrow    Mallampati score of IV (only hard palate visible)    Pharynx is nonerythematous, with both tonsils mildly enlarged   Chest Wall:  Normal shape. Symmetric expansion with respiration. No tenderness.          Lungs:  Clear to auscultation bilaterally. No wheezes. No rhonchi. No rales. Respirations regular, even and unlabored.            Heart:  Regular rhythm and normal rate. Normal S1 and S2. No murmur.     Abdomen:  Soft, non-tender and non-distended. Normal bowel sounds. No masses.  Extremities:  Moves all extremities well. No edema.           Pulses: Pulses palpable and equal bilaterally.               Skin: Dry. Intact. No bleeding. No rash.           Neuro: Moves all 4 extremities and cranial nerves grossly intact.  Psychiatric: Normal mood and affect.      Current Outpatient Medications:   •  cyclobenzaprine (FLEXERIL) 5 MG tablet, Take 5 mg by mouth 3 (Three) Times a Day As Needed for Muscle Spasms., Disp: , Rfl:   •  metFORMIN (GLUCOPHAGE) 500 MG tablet, Take 1 tablet by mouth Daily With Breakfast., Disp: 30 tablet, Rfl: 11  •  sertraline (ZOLOFT) 50 MG tablet, Take 0.5-1 tablets by mouth Daily., Disp: 30 tablet, Rfl: 11    Lab Results   Component Value Date    WBC 13.68 (H) 08/28/2019    HGB 14.1 08/28/2019    HCT 42.4 08/28/2019    MCV 88.0 08/28/2019     08/28/2019     Lab Results   Component Value Date    GLUCOSE 85 08/28/2019    CALCIUM 9.3 08/28/2019     08/28/2019    K 3.9 08/28/2019    CO2 24.0 08/28/2019     08/28/2019    BUN 10 08/28/2019    CREATININE 0.72 08/28/2019    EGFRIFNONA 98 08/28/2019    BCR 13.9 08/28/2019    ANIONGAP 13.0 08/28/2019     No results found for: INR,  PROTIME  Lab Results   Component Value Date    TROPONINT <0.010 08/28/2019       No results found for: PHART, LRC7KAY, PO2ART]    ASSESSMENT:  1. Obstructive sleep apnea New, further workup planned (4)  1. Check home sleep study   2. Obesity  3. Tonsillar hypertrophy       This document has been electronically signed by Pradip Pinedo MD on October 9, 2019         CC: Esteban Sands MD Hack, Michael L, MD

## 2019-10-17 ENCOUNTER — OFFICE VISIT (OUTPATIENT)
Dept: GASTROENTEROLOGY | Facility: CLINIC | Age: 24
End: 2019-10-17

## 2019-10-17 VITALS
WEIGHT: 236.6 LBS | HEIGHT: 65 IN | HEART RATE: 84 BPM | DIASTOLIC BLOOD PRESSURE: 68 MMHG | OXYGEN SATURATION: 99 % | SYSTOLIC BLOOD PRESSURE: 116 MMHG | BODY MASS INDEX: 39.42 KG/M2

## 2019-10-17 DIAGNOSIS — R10.13 EPIGASTRIC PAIN: Primary | ICD-10-CM

## 2019-10-17 PROCEDURE — 99202 OFFICE O/P NEW SF 15 MIN: CPT | Performed by: INTERNAL MEDICINE

## 2019-10-17 RX ORDER — DEXTROSE AND SODIUM CHLORIDE 5; .45 G/100ML; G/100ML
30 INJECTION, SOLUTION INTRAVENOUS CONTINUOUS PRN
Status: CANCELLED | OUTPATIENT
Start: 2019-11-11

## 2019-10-17 NOTE — PATIENT INSTRUCTIONS
Abdominal Pain, Adult    Many things can cause belly (abdominal) pain. Most times, belly pain is not dangerous. Many cases of belly pain can be watched and treated at home. Sometimes belly pain is serious, though. Your doctor will try to find the cause of your belly pain.  Follow these instructions at home:  · Take over-the-counter and prescription medicines only as told by your doctor. Do not take medicines that help you poop (laxatives) unless told to by your doctor.  · Drink enough fluid to keep your pee (urine) clear or pale yellow.  · Watch your belly pain for any changes.  · Keep all follow-up visits as told by your doctor. This is important.  Contact a doctor if:  · Your belly pain changes or gets worse.  · You are not hungry, or you lose weight without trying.  · You are having trouble pooping (constipated) or have watery poop (diarrhea) for more than 2-3 days.  · You have pain when you pee or poop.  · Your belly pain wakes you up at night.  · Your pain gets worse with meals, after eating, or with certain foods.  · You are throwing up and cannot keep anything down.  · You have a fever.  Get help right away if:  · Your pain does not go away as soon as your doctor says it should.  · You cannot stop throwing up.  · Your pain is only in areas of your belly, such as the right side or the left lower part of the belly.  · You have bloody or black poop, or poop that looks like tar.  · You have very bad pain, cramping, or bloating in your belly.  · You have signs of not having enough fluid or water in your body (dehydration), such as:  ? Dark pee, very little pee, or no pee.  ? Cracked lips.  ? Dry mouth.  ? Sunken eyes.  ? Sleepiness.  ? Weakness.  This information is not intended to replace advice given to you by your health care provider. Make sure you discuss any questions you have with your health care provider.  Document Released: 06/05/2009 Document Revised: 07/07/2017 Document Reviewed: 05/31/2017  [employer]  Interactive Patient Education © 2019 [employer]

## 2019-10-17 NOTE — PROGRESS NOTES
[facility]      Chief Complaint:   Chief Complaint   Patient presents with   • Umbilical Pain       Subjective     HPI:   Patient with epigastric abdominal discomfort.  Patient states that she feels as though she has an umbilical hernia.  On palpation of this area is tender but no hernias felt.  Patient states that some foods make this more comfortable her children jumping on her abdomen also makes more uncomfortable.  Patient denies any change in bowel habits or diarrhea.    Plan; schedule patient for EGD and CT scan of the abdomen and pelvis with p.o. and IV contrast patient follow-up following this and consider further work-up depending on results.    Past Medical History:   Past Medical History:   Diagnosis Date   • Acid reflux    • Allergic rhinitis    • Anxiety    • Anxiety    • Bronchitis     probable   • Common cold    • Conjunctivitis    • Dysfunction of eustachian tube    • Examination of eyes and vision     general; NORMAL   • Gastroenteritis    • Headache    • Hemorrhoids     likely   • Infection of skin and subcutaneous tissue    • Migraine    • Nonvenomous insect bite    • Open wound of lower limb    • Pharyngitis    • Rhinitis    • Tick bite    • URI (upper respiratory infection)        Past Surgical History:  Past Surgical History:   Procedure Laterality Date   • SALPINGECTOMY Bilateral 8/7/2018    Procedure: SALPINGECTOMY LAPAROSCOPIC;  Surgeon: Friday, Elizabeth SMITH MD;  Location: [facility];  Service: Obstetrics/Gynecology   • WISDOM TOOTH EXTRACTION         Family History:  Family History   Problem Relation Age of Onset   • Hypertension Brother    • Hypertension Paternal Grandmother    • Hypertension Maternal Grandmother    • Diabetes Maternal Grandmother    • No Known Problems Father    • No Known Problems Mother    • Heart attack Paternal Grandfather    • Hypertension Maternal Grandfather    • Sleep apnea Maternal Grandfather    • Heart disease Other    • Stroke Other    •  "Hypertension Other    • Diabetes Other    • Crohn's disease Other         Maternal Grandmother - Ana Rosa Santamaria   • Diverticulitis Other         Maternal Grandmother - Ana Rosa Santamaria   • Colon polyps Other         Maternal Grandmother - Ana Rosa Santamaria       Social History:   reports that she has been smoking cigarettes.  She has a 13.00 pack-year smoking history. She has never used smokeless tobacco. She reports that she does not drink alcohol or use drugs.    Medications:   Prior to Admission medications    Medication Sig Start Date End Date Taking? Authorizing Provider   cyclobenzaprine (FLEXERIL) 5 MG tablet Take 5 mg by mouth 3 (Three) Times a Day As Needed for Muscle Spasms.   Yes Provider, MD Divya   metFORMIN (GLUCOPHAGE) 500 MG tablet Take 1 tablet by mouth Daily With Breakfast. 2/26/19 2/26/20 Yes Diandra Robins APRN   sertraline (ZOLOFT) 50 MG tablet Take 0.5-1 tablets by mouth Daily. 9/18/19  Yes Esteban Sands MD       Allergies:  Patient has no known allergies.    ROS:    Review of Systems   Constitutional: Negative for activity change, appetite change, chills, diaphoresis, fatigue, fever and unexpected weight change.   HENT: Negative for sore throat and trouble swallowing.    Respiratory: Negative for shortness of breath.    Gastrointestinal: Positive for abdominal pain. Negative for abdominal distention, anal bleeding, blood in stool, constipation, diarrhea, nausea, rectal pain and vomiting.   Endocrine: Negative for polydipsia, polyphagia and polyuria.   Genitourinary: Negative for difficulty urinating.   Musculoskeletal: Negative for arthralgias.   Skin: Negative for pallor.   Allergic/Immunologic: Negative for food allergies.   Neurological: Negative for weakness and light-headedness.   Psychiatric/Behavioral: Negative for behavioral problems.     Objective     Blood pressure 116/68, pulse 84, height 163.8 cm (64.5\"), weight 107 kg (236 lb 9.6 oz), SpO2 99 %, not currently " breastfeeding.    Physical Exam   Constitutional: She is oriented to person, place, and time. She appears well-developed and well-nourished. No distress.   HENT:   Head: Normocephalic and atraumatic.   Cardiovascular: Normal rate, regular rhythm, normal heart sounds and intact distal pulses. Exam reveals no gallop and no friction rub.   No murmur heard.  Pulmonary/Chest: Breath sounds normal. No respiratory distress. She has no wheezes. She has no rales. She exhibits no tenderness.   Abdominal: Soft. Bowel sounds are normal. She exhibits no distension and no mass. There is tenderness. There is no rebound and no guarding. No hernia.   Musculoskeletal: Normal range of motion. She exhibits no edema.   Neurological: She is alert and oriented to person, place, and time.   Skin: Skin is warm and dry. No rash noted. She is not diaphoretic. No erythema. No pallor.   Psychiatric: She has a normal mood and affect. Her behavior is normal. Judgment and thought content normal.        Assessment/Plan   Marleny was seen today for umbilical pain.    Diagnoses and all orders for this visit:    Epigastric pain  -     CT Abdomen Pelvis With Contrast; Future  -     Case Request; Standing  -     dextrose 5 % and sodium chloride 0.45 % infusion  -     Case Request    Other orders  -     Follow Anesthesia Guidelines / Standing Orders; Future  -     Obtain Informed Consent; Future  -     Implement Anesthesia Orders Day of Procedure; Standing  -     Obtain Informed Consent; Standing  -     POC Glucose Once; Standing  -     Pregnancy, Urine -; Standing  -     Insert Peripheral IV; Standing        ESOPHAGOGASTRODUODENOSCOPY (N/A)     Diagnosis Plan   1. Epigastric pain  CT Abdomen Pelvis With Contrast    Case Request    dextrose 5 % and sodium chloride 0.45 % infusion    Case Request       Anticipated Surgical Procedure:  Orders Placed This Encounter   Procedures   • CT Abdomen Pelvis With Contrast     Standing Status:   Future     Standing  Expiration Date:   10/17/2020     Order Specific Question:   Patient Pregnant     Answer:   No     Order Specific Question:   Will Oral Contrast be needed for this procedure?     Answer:   Yes   • Follow Anesthesia Guidelines / Standing Orders     Standing Status:   Future   • Obtain Informed Consent     Standing Status:   Future     Order Specific Question:   Informed Consent Given For     Answer:   ESOPHAGOGASTRODUODENOSCOPY       The risks, benefits, and alternatives of this procedure have been discussed with the patient or the responsible party- the patient understands and agrees to proceed.

## 2019-10-18 PROBLEM — R10.13 EPIGASTRIC PAIN: Status: ACTIVE | Noted: 2019-10-18

## 2019-10-24 ENCOUNTER — TRANSCRIBE ORDERS (OUTPATIENT)
Dept: CT IMAGING | Facility: HOSPITAL | Age: 24
End: 2019-10-24

## 2019-10-24 DIAGNOSIS — R10.13 ABDOMINAL PAIN, EPIGASTRIC: Primary | ICD-10-CM

## 2019-10-25 ENCOUNTER — LAB (OUTPATIENT)
Dept: LAB | Facility: HOSPITAL | Age: 24
End: 2019-10-25

## 2019-10-25 ENCOUNTER — HOSPITAL ENCOUNTER (OUTPATIENT)
Dept: CT IMAGING | Facility: HOSPITAL | Age: 24
Discharge: HOME | End: 2019-10-25
Admitting: INTERNAL MEDICINE

## 2019-10-25 DIAGNOSIS — R10.13 EPIGASTRIC PAIN: ICD-10-CM

## 2019-10-25 DIAGNOSIS — R10.13 ABDOMINAL PAIN, EPIGASTRIC: ICD-10-CM

## 2019-10-25 LAB — B-HCG UR QL: NEGATIVE

## 2019-10-25 PROCEDURE — 25010000002 IOPAMIDOL 61 % SOLUTION: Performed by: INTERNAL MEDICINE

## 2019-10-25 PROCEDURE — 81025 URINE PREGNANCY TEST: CPT

## 2019-10-25 PROCEDURE — 74177 CT ABD & PELVIS W/CONTRAST: CPT

## 2019-10-25 RX ADMIN — IOPAMIDOL 95 ML: 612 INJECTION, SOLUTION INTRAVENOUS at 14:31

## 2019-10-30 ENCOUNTER — OFFICE VISIT (OUTPATIENT)
Dept: FAMILY MEDICINE CLINIC | Facility: CLINIC | Age: 24
End: 2019-10-30

## 2019-10-30 VITALS
TEMPERATURE: 97.5 F | HEART RATE: 70 BPM | DIASTOLIC BLOOD PRESSURE: 60 MMHG | BODY MASS INDEX: 41.89 KG/M2 | SYSTOLIC BLOOD PRESSURE: 100 MMHG | WEIGHT: 245.4 LBS | HEIGHT: 64 IN | OXYGEN SATURATION: 100 %

## 2019-10-30 DIAGNOSIS — F41.9 ANXIETY: Primary | ICD-10-CM

## 2019-10-30 DIAGNOSIS — R06.83 SNORING: ICD-10-CM

## 2019-10-30 DIAGNOSIS — M53.3 SACROILIAC JOINT DYSFUNCTION OF RIGHT SIDE: ICD-10-CM

## 2019-10-30 PROCEDURE — 99214 OFFICE O/P EST MOD 30 MIN: CPT | Performed by: FAMILY MEDICINE

## 2019-10-30 RX ORDER — IBUPROFEN 600 MG/1
600 TABLET ORAL EVERY 8 HOURS PRN
Qty: 90 TABLET | Refills: 1 | Status: SHIPPED | OUTPATIENT
Start: 2019-10-30 | End: 2022-04-22

## 2019-10-30 NOTE — PROGRESS NOTES
" Subjective   Marleny Conn is a 24 y.o. female.     History of Present Illness     6 week follow up regarding anxiety  Anxiety some better on 25mg zoloft  To have home sleep study soon  Back hurts.  Use to se dr sonya darden chiropractor    Review of Systems   Constitutional: Negative for chills, fatigue and fever.   HENT: Negative for congestion, ear discharge, ear pain, facial swelling, hearing loss, postnasal drip, rhinorrhea, sinus pressure, sore throat, trouble swallowing and voice change.    Eyes: Negative for discharge, redness and visual disturbance.   Respiratory: Negative for cough, chest tightness, shortness of breath and wheezing.    Cardiovascular: Negative for chest pain and palpitations.   Gastrointestinal: Negative for abdominal pain, blood in stool, constipation, diarrhea, nausea and vomiting.   Endocrine: Negative for polydipsia and polyuria.   Genitourinary: Negative for dysuria, flank pain, hematuria and urgency.   Musculoskeletal: Positive for back pain. Negative for arthralgias, joint swelling and myalgias.   Skin: Negative for rash.   Neurological: Negative for dizziness, weakness, numbness and headaches.   Hematological: Negative for adenopathy.   Psychiatric/Behavioral: Negative for confusion and sleep disturbance. The patient is nervous/anxious.            /60 (BP Location: Left arm, Patient Position: Sitting, Cuff Size: Adult)   Pulse 70   Temp 97.5 °F (36.4 °C) (Temporal)   Ht 163.8 cm (64.49\")   Wt 111 kg (245 lb 6.4 oz)   SpO2 100%   BMI 41.49 kg/m²       Objective     Physical Exam   Constitutional: She is oriented to person, place, and time. She appears well-developed and well-nourished.   HENT:   Head: Normocephalic and atraumatic.   Right Ear: External ear normal.   Left Ear: External ear normal.   Nose: Nose normal.   Eyes: Conjunctivae and EOM are normal. Pupils are equal, round, and reactive to light.   Neck: Normal range of motion.   Pulmonary/Chest: Effort " normal.   Musculoskeletal: Normal range of motion.   Right hemipelvis higher than left, si joint tenderness right side palpation   Neurological: She is alert and oriented to person, place, and time.   Psychiatric: She has a normal mood and affect. Her behavior is normal. Judgment and thought content normal.   Nursing note and vitals reviewed.          PAST MEDICAL HISTORY     Past Medical History:   Diagnosis Date   • Acid reflux    • Allergic rhinitis    • Anxiety    • Anxiety    • Bronchitis     probable   • Common cold    • Conjunctivitis    • Dysfunction of eustachian tube    • Examination of eyes and vision     general; NORMAL   • Gastroenteritis    • Headache    • Hemorrhoids     likely   • Infection of skin and subcutaneous tissue    • Migraine    • Nonvenomous insect bite    • Open wound of lower limb    • Pharyngitis    • Rhinitis    • Tick bite    • URI (upper respiratory infection)       PAST SURGICAL HISTORY     Past Surgical History:   Procedure Laterality Date   • SALPINGECTOMY Bilateral 2018    Procedure: SALPINGECTOMY LAPAROSCOPIC;  Surgeon: Friday, Elizabeth SMITH MD;  Location: [facility];  Service: Obstetrics/Gynecology   • WISDOM TOOTH EXTRACTION        SOCIAL HISTORY     Social History     Socioeconomic History   • Marital status: Single     Spouse name: Not on file   • Number of children: 2   • Years of education: Not on file   • Highest education level: Not on file   Occupational History   • Occupation:       Employer: [employer]     Comment: Patient resides with children.   Tobacco Use   • Smoking status: Former Smoker     Packs/day: 1.00     Years: 13.00     Pack years: 13.00     Last attempt to quit: 2019     Years since quittin.0   • Smokeless tobacco: Never Used   Substance and Sexual Activity   • Alcohol use: No   • Drug use: No   • Sexual activity: Yes     Partners: Male     Birth control/protection: Tubal ligation      ALLERGIES   Patient has no known allergies.    MEDICATIONS     Current Outpatient Medications   Medication Sig Dispense Refill   • cyclobenzaprine (FLEXERIL) 5 MG tablet Take 5 mg by mouth 3 (Three) Times a Day As Needed for Muscle Spasms.     • ibuprofen (ADVIL,MOTRIN) 600 MG tablet Take 1 tablet by mouth Every 8 (Eight) Hours As Needed for Mild Pain . 90 tablet 1   • metFORMIN (GLUCOPHAGE) 500 MG tablet Take 1 tablet by mouth Daily With Breakfast. 30 tablet 11   • sertraline (ZOLOFT) 50 MG tablet Take 1 tablet by mouth Daily. 90 tablet 3     No current facility-administered medications for this visit.         The following portions of the patient's history were reviewed and updated as appropriate: allergies, current medications, past family history, past medical history, past social history, past surgical history and problem list.        Assessment/Plan   Marleny was seen today for follow-up.    Diagnoses and all orders for this visit:    Anxiety    Sacroiliac joint dysfunction of right side  -     Ambulatory Referral to Physical Therapy Evaluate and treat, Ortho    Snoring    Other orders  -     sertraline (ZOLOFT) 50 MG tablet; Take 1 tablet by mouth Daily.  -     ibuprofen (ADVIL,MOTRIN) 600 MG tablet; Take 1 tablet by mouth Every 8 (Eight) Hours As Needed for Mild Pain .      Increase zoloft 50mg.  Call if any problems   Pt for back, probably need orthotics.                  No Follow-up on file.                  This document has been electronically signed by Esteban Sands MD on October 30, 2019 2:43 PM

## 2019-10-30 NOTE — PATIENT INSTRUCTIONS

## 2019-11-11 ENCOUNTER — ANESTHESIA EVENT (OUTPATIENT)
Dept: GASTROENTEROLOGY | Facility: HOSPITAL | Age: 24
End: 2019-11-11

## 2019-11-11 ENCOUNTER — ANESTHESIA (OUTPATIENT)
Dept: GASTROENTEROLOGY | Facility: HOSPITAL | Age: 24
End: 2019-11-11

## 2019-11-11 ENCOUNTER — HOSPITAL ENCOUNTER (OUTPATIENT)
Facility: HOSPITAL | Age: 24
Setting detail: HOSPITAL OUTPATIENT SURGERY
Discharge: HOME | End: 2019-11-11
Attending: INTERNAL MEDICINE | Admitting: INTERNAL MEDICINE

## 2019-11-11 VITALS
HEART RATE: 104 BPM | TEMPERATURE: 97.8 F | BODY MASS INDEX: 40.87 KG/M2 | SYSTOLIC BLOOD PRESSURE: 111 MMHG | HEIGHT: 64 IN | WEIGHT: 239.42 LBS | RESPIRATION RATE: 14 BRPM | DIASTOLIC BLOOD PRESSURE: 62 MMHG | OXYGEN SATURATION: 96 %

## 2019-11-11 DIAGNOSIS — R10.13 EPIGASTRIC PAIN: ICD-10-CM

## 2019-11-11 LAB — GLUCOSE BLDC GLUCOMTR-MCNC: 87 MG/DL (ref 70–130)

## 2019-11-11 PROCEDURE — 43239 EGD BIOPSY SINGLE/MULTIPLE: CPT | Performed by: INTERNAL MEDICINE

## 2019-11-11 PROCEDURE — 25010000002 PROPOFOL 10 MG/ML EMULSION: Performed by: NURSE ANESTHETIST, CERTIFIED REGISTERED

## 2019-11-11 PROCEDURE — 25010000002 MIDAZOLAM PER 1 MG: Performed by: NURSE ANESTHETIST, CERTIFIED REGISTERED

## 2019-11-11 PROCEDURE — 88305 TISSUE EXAM BY PATHOLOGIST: CPT | Performed by: INTERNAL MEDICINE

## 2019-11-11 PROCEDURE — 82962 GLUCOSE BLOOD TEST: CPT

## 2019-11-11 PROCEDURE — 88305 TISSUE EXAM BY PATHOLOGIST: CPT | Performed by: PATHOLOGY

## 2019-11-11 RX ORDER — LIDOCAINE HYDROCHLORIDE 20 MG/ML
INJECTION, SOLUTION INTRAVENOUS AS NEEDED
Status: DISCONTINUED | OUTPATIENT
Start: 2019-11-11 | End: 2019-11-11 | Stop reason: SURG

## 2019-11-11 RX ORDER — PROMETHAZINE HYDROCHLORIDE 25 MG/1
25 SUPPOSITORY RECTAL ONCE AS NEEDED
Status: DISCONTINUED | OUTPATIENT
Start: 2019-11-11 | End: 2019-11-11 | Stop reason: HOSPADM

## 2019-11-11 RX ORDER — PROMETHAZINE HYDROCHLORIDE 25 MG/ML
12.5 INJECTION, SOLUTION INTRAMUSCULAR; INTRAVENOUS ONCE AS NEEDED
Status: DISCONTINUED | OUTPATIENT
Start: 2019-11-11 | End: 2019-11-11 | Stop reason: HOSPADM

## 2019-11-11 RX ORDER — ONDANSETRON 2 MG/ML
4 INJECTION INTRAMUSCULAR; INTRAVENOUS ONCE AS NEEDED
Status: DISCONTINUED | OUTPATIENT
Start: 2019-11-11 | End: 2019-11-11 | Stop reason: HOSPADM

## 2019-11-11 RX ORDER — MIDAZOLAM HYDROCHLORIDE 1 MG/ML
INJECTION INTRAMUSCULAR; INTRAVENOUS AS NEEDED
Status: DISCONTINUED | OUTPATIENT
Start: 2019-11-11 | End: 2019-11-11 | Stop reason: SURG

## 2019-11-11 RX ORDER — PROPOFOL 10 MG/ML
VIAL (ML) INTRAVENOUS AS NEEDED
Status: DISCONTINUED | OUTPATIENT
Start: 2019-11-11 | End: 2019-11-11 | Stop reason: SURG

## 2019-11-11 RX ORDER — PROMETHAZINE HYDROCHLORIDE 25 MG/1
25 TABLET ORAL ONCE AS NEEDED
Status: DISCONTINUED | OUTPATIENT
Start: 2019-11-11 | End: 2019-11-11 | Stop reason: HOSPADM

## 2019-11-11 RX ORDER — DEXTROSE AND SODIUM CHLORIDE 5; .45 G/100ML; G/100ML
30 INJECTION, SOLUTION INTRAVENOUS CONTINUOUS PRN
Status: DISCONTINUED | OUTPATIENT
Start: 2019-11-11 | End: 2019-11-11 | Stop reason: HOSPADM

## 2019-11-11 RX ADMIN — PROPOFOL 50 MG: 10 INJECTION, EMULSION INTRAVENOUS at 14:10

## 2019-11-11 RX ADMIN — LIDOCAINE HYDROCHLORIDE 50 MG: 20 INJECTION, SOLUTION INTRAVENOUS at 14:07

## 2019-11-11 RX ADMIN — MIDAZOLAM HYDROCHLORIDE 2 MG: 2 INJECTION, SOLUTION INTRAMUSCULAR; INTRAVENOUS at 14:06

## 2019-11-11 RX ADMIN — PROPOFOL 50 MG: 10 INJECTION, EMULSION INTRAVENOUS at 14:09

## 2019-11-11 RX ADMIN — PROPOFOL 50 MG: 10 INJECTION, EMULSION INTRAVENOUS at 14:07

## 2019-11-11 RX ADMIN — DEXTROSE AND SODIUM CHLORIDE 30 ML/HR: 5; 450 INJECTION, SOLUTION INTRAVENOUS at 12:37

## 2019-11-11 RX ADMIN — PROPOFOL 50 MG: 10 INJECTION, EMULSION INTRAVENOUS at 14:08

## 2019-11-11 NOTE — ANESTHESIA POSTPROCEDURE EVALUATION
Patient: Marleny Conn    Procedure Summary     Date:  11/11/19 Room / Location:  [facility] ENDOSCOPY 1 / [facility]COPY    Anesthesia Start:  1404 Anesthesia Stop:  1414    Procedure:  ESOPHAGOGASTRODUODENOSCOPY (N/A ) Diagnosis:       Epigastric pain      (Epigastric pain [R10.13])    Surgeon:  Sterling Neal MD Provider:  Frank Stuart CRNA    Anesthesia Type:  MAC ASA Status:  3          Anesthesia Type: MAC  Last vitals  BP   106/51 (11/11/19 1231)   Temp   97.4 °F (36.3 °C) (11/11/19 1231)   Pulse   66 (11/11/19 1231)   Resp   18 (11/11/19 1231)     SpO2   99 % (11/11/19 1231)     Post Anesthesia Care and Evaluation    Patient location during evaluation: bedside  Patient participation: complete - patient cannot participate  Level of consciousness: awake  Pain score: 0  Pain management: adequate  Airway patency: patent  Anesthetic complications: No anesthetic complications  PONV Status: none  Cardiovascular status: acceptable  Respiratory status: acceptable  Hydration status: acceptable

## 2019-11-11 NOTE — ANESTHESIA PREPROCEDURE EVALUATION
Anesthesia Evaluation     NPO Solid Status: > 8 hours  NPO Liquid Status: > 8 hours           Airway   Mallampati: III  TM distance: >3 FB  Neck ROM: full  No difficulty expected  Dental - normal exam     Pulmonary - normal exam   Cardiovascular - normal exam        Neuro/Psych  GI/Hepatic/Renal/Endo      Musculoskeletal     Abdominal    Substance History      OB/GYN          Other                        Anesthesia Plan    ASA 3     MAC     intravenous induction     Anesthetic plan, all risks, benefits, and alternatives have been provided, discussed and informed consent has been obtained with: patient.

## 2019-11-12 ENCOUNTER — APPOINTMENT (OUTPATIENT)
Dept: SLEEP MEDICINE | Facility: HOSPITAL | Age: 24
End: 2019-11-12

## 2019-11-13 LAB
LAB AP CASE REPORT: NORMAL
PATH REPORT.FINAL DX SPEC: NORMAL
PATH REPORT.GROSS SPEC: NORMAL

## 2019-11-18 ENCOUNTER — APPOINTMENT (OUTPATIENT)
Dept: LAB | Facility: HOSPITAL | Age: 24
End: 2019-11-18

## 2019-11-18 ENCOUNTER — OFFICE VISIT (OUTPATIENT)
Dept: GASTROENTEROLOGY | Facility: CLINIC | Age: 24
End: 2019-11-18

## 2019-11-18 VITALS
WEIGHT: 244.2 LBS | DIASTOLIC BLOOD PRESSURE: 80 MMHG | BODY MASS INDEX: 40.69 KG/M2 | HEART RATE: 86 BPM | SYSTOLIC BLOOD PRESSURE: 118 MMHG | HEIGHT: 65 IN

## 2019-11-18 DIAGNOSIS — K21.00 GASTROESOPHAGEAL REFLUX DISEASE WITH ESOPHAGITIS: Primary | ICD-10-CM

## 2019-11-18 DIAGNOSIS — R93.2 ABNORMAL CT OF LIVER: ICD-10-CM

## 2019-11-18 DIAGNOSIS — K29.50 CHRONIC GASTRITIS WITHOUT BLEEDING, UNSPECIFIED GASTRITIS TYPE: ICD-10-CM

## 2019-11-18 PROCEDURE — 80053 COMPREHEN METABOLIC PANEL: CPT | Performed by: NURSE PRACTITIONER

## 2019-11-18 PROCEDURE — 36415 COLL VENOUS BLD VENIPUNCTURE: CPT | Performed by: NURSE PRACTITIONER

## 2019-11-18 PROCEDURE — 99214 OFFICE O/P EST MOD 30 MIN: CPT | Performed by: NURSE PRACTITIONER

## 2019-11-18 RX ORDER — PANTOPRAZOLE SODIUM 40 MG/1
40 TABLET, DELAYED RELEASE ORAL DAILY
Qty: 30 TABLET | Refills: 5 | Status: SHIPPED | OUTPATIENT
Start: 2019-11-18 | End: 2021-04-09

## 2019-11-18 NOTE — PATIENT INSTRUCTIONS

## 2019-11-18 NOTE — PROGRESS NOTES
Chief Complaint   Patient presents with   • Abdominal Pain       Subjective    Marleny Conn is a 24 y.o. female. she is here today for follow-up.    History of Present Illness  24-year-old female presents for follow-up after CT and EGD.  States still has constant epigastric abdominal pain also has chronic back pain.  Describes pain as sharp and wakes her up in the middle the night.  Radiates to her right upper quadrant and lingers there.  Reports she has been trying to lose weight but has been unable to successfully.  CT IMPRESSION:  Appendix is normal.  Small amount of pelvic free fluid.  No evidence of bowel obstruction or perienteric inflammation.  No evidence of obstructive uropathy.  1.2 cm focus of low-attenuation in the right lower liver on image  16 of series 2, which may represent possible hemangioma, among  other etiologies. Further assessment with MRI utilizing hepatic  protocol is helpful.  Grade 1 spondylolisthesis of L5 on S1 with bilateral pars defect  of L5..    EGD noted mildly severe esophagitis, gastritis and normal duodenum.  Antrum biopsy no reactive gastropathy.  Esophagus biopsy noted chronic esophagitis.       The following portions of the patient's history were reviewed and updated as appropriate:   Past Medical History:   Diagnosis Date   • Acid reflux    • Allergic rhinitis    • Anxiety    • Anxiety    • Bronchitis     probable   • Common cold    • Conjunctivitis    • Dysfunction of eustachian tube    • Examination of eyes and vision     general; NORMAL   • Gastroenteritis    • Headache    • Hemorrhoids     likely   • Infection of skin and subcutaneous tissue    • Migraine    • Nonvenomous insect bite    • Open wound of lower limb    • Pharyngitis    • Rhinitis    • Tick bite    • URI (upper respiratory infection)      Past Surgical History:   Procedure Laterality Date   • ENDOSCOPY N/A 11/11/2019    Procedure: ESOPHAGOGASTRODUODENOSCOPY;  Surgeon: Sterling Neal MD;  Location:   [facility] ENDOSCOPY;  Service: Gastroenterology   • SALPINGECTOMY Bilateral 2018    Procedure: SALPINGECTOMY LAPAROSCOPIC;  Surgeon: Elizabeth Kenney MD;  Location: [facility];  Service: Obstetrics/Gynecology   • WISDOM TOOTH EXTRACTION       Family History   Problem Relation Age of Onset   • Hypertension Brother    • Hypertension Paternal Grandmother    • Hypertension Maternal Grandmother    • Diabetes Maternal Grandmother    • No Known Problems Father    • No Known Problems Mother    • Heart attack Paternal Grandfather    • Hypertension Maternal Grandfather    • Sleep apnea Maternal Grandfather    • Heart disease Other    • Stroke Other    • Hypertension Other    • Diabetes Other    • Crohn's disease Other         Maternal Grandmother - Ana Rosa Santamaria   • Diverticulitis Other         Maternal Grandmother - Ana Rosa Santamaria   • Colon polyps Other         Maternal Grandmother - Ana Rosa Santamaria     OB History      Para Term  AB Living    2 2 2     2    SAB TAB Ectopic Molar Multiple Live Births            0 2        Prior to Admission medications    Medication Sig Start Date End Date Taking? Authorizing Provider   ibuprofen (ADVIL,MOTRIN) 600 MG tablet Take 1 tablet by mouth Every 8 (Eight) Hours As Needed for Mild Pain . 10/30/19  Yes Esteban Sands MD   metFORMIN (GLUCOPHAGE) 500 MG tablet Take 1 tablet by mouth Daily With Breakfast. 19 Yes Diandra Robins APRN   sertraline (ZOLOFT) 50 MG tablet Take 1 tablet by mouth Daily. 10/30/19  Yes Esteban Sands MD     No Known Allergies  Social History     Socioeconomic History   • Marital status: Single     Spouse name: Not on file   • Number of children: 2   • Years of education: Not on file   • Highest education level: Not on file   Occupational History   • Occupation:       Employer: [employer]     Comment: Patient resides with children.   Tobacco Use   • Smoking status: Former Smoker     Packs/day: 1.00     Years: 13.00     Pack  "years: 13.00     Last attempt to quit: 2019     Years since quittin.1   • Smokeless tobacco: Never Used   Substance and Sexual Activity   • Alcohol use: No   • Drug use: No   • Sexual activity: Yes     Partners: Male     Birth control/protection: Tubal ligation       Review of Systems  Review of Systems   Constitutional: Positive for fatigue. Negative for activity change, appetite change, chills, diaphoresis, fever and unexpected weight change.   HENT: Negative for sore throat and trouble swallowing.    Respiratory: Negative for shortness of breath.    Gastrointestinal: Positive for abdominal pain and nausea. Negative for abdominal distention, anal bleeding, blood in stool, constipation, diarrhea, rectal pain and vomiting.   Musculoskeletal: Positive for back pain. Negative for arthralgias.   Skin: Negative for pallor.   Neurological: Negative for light-headedness.        /80 (BP Location: Left arm)   Pulse 86   Ht 163.8 cm (64.5\")   Wt 111 kg (244 lb 3.2 oz)   LMP 10/30/2019   BMI 41.27 kg/m²     Objective    Physical Exam   Constitutional: She is oriented to person, place, and time. She appears well-developed and well-nourished. She is cooperative. No distress.   HENT:   Head: Normocephalic and atraumatic.   Neck: Normal range of motion. Neck supple. No thyromegaly present.   Cardiovascular: Normal rate, regular rhythm and normal heart sounds.   Pulmonary/Chest: Effort normal and breath sounds normal. She has no wheezes. She has no rhonchi. She has no rales.   Abdominal: Soft. Normal appearance and bowel sounds are normal. She exhibits no distension. There is no hepatosplenomegaly. There is tenderness in the right upper quadrant and epigastric area. There is no rigidity and no guarding. No hernia.   Lymphadenopathy:     She has no cervical adenopathy.   Neurological: She is alert and oriented to person, place, and time.   Skin: Skin is warm, dry and intact. No rash noted. No pallor. "   Psychiatric: She has a normal mood and affect. Her speech is normal.     Admission on 11/11/2019, Discharged on 11/11/2019   Component Date Value Ref Range Status   • Glucose 11/11/2019 87  70 - 130 mg/dL Final    : [id number] LIBBY Rao ID: [id number]   • Case Report 11/11/2019    Final                    Value:Surgical Pathology Report                         Case: [id number]                                  Authorizing Provider:  Sterling Neal MD        Collected:           11/11/2019 02:13 PM          Ordering Location:     [facility]             Received:            11/12/2019 06:59 AM                                 [place] [facility]                                                     Pathologist:           Porfirio Quarles MD                                                           Specimens:   1) - Gastric, Antrum                                                                                2) - Esophagus, Distal                                                                    • Final Diagnosis 11/11/2019    Final                    Value:This result contains rich text formatting which cannot be displayed here.   • Gross Description 11/11/2019    Final                    Value:This result contains rich text formatting which cannot be displayed here.     Assessment/Plan      1. Gastroesophageal reflux disease with esophagitis    2. Chronic gastritis without bleeding, unspecified gastritis type    3. Abnormal CT of liver    .   Start patient on PPI daily recommend avoidance of ibuprofen and NSAIDs.  Discussed standard antireflux measures avoidance of gastric irritants and continue to attempt to lose weight.  We will obtain MRI of liver to further evaluate abnormality on liver.  Follow-up after test return office sooner if needed    Orders placed during this encounter include:  Orders Placed This Encounter   Procedures   • MRI Abdomen With Contrast     Standing Status:    Future     Standing Expiration Date:   11/18/2020     Order Specific Question:   Patient Pregnant     Answer:   No   • MRI Abdomen With & Without Contrast     Standing Status:   Future     Standing Expiration Date:   11/18/2020     Order Specific Question:   Patient Pregnant     Answer:   No   • Comprehensive Metabolic Panel       * Surgery not found *    Review and/or summary of lab tests, radiology, procedures, medications. Review and summary of old records and obtaining of history. The risks and benefits of my recommendations, as well as other treatment options were discussed with the patient today. Questions were answered.    New Medications Ordered This Visit   Medications   • pantoprazole (PROTONIX) 40 MG EC tablet     Sig: Take 1 tablet by mouth Daily.     Dispense:  30 tablet     Refill:  5       Follow-up: Return in about 4 weeks (around 12/16/2019).          This document has been electronically signed by PETTY Marshall on November 19, 2019 9:28 AM             Results for orders placed or performed in visit on 11/18/19   Comprehensive Metabolic Panel   Result Value Ref Range    Glucose 79 65 - 99 mg/dL    BUN 17 6 - 20 mg/dL    Creatinine 0.64 0.57 - 1.00 mg/dL    Sodium 136 136 - 145 mmol/L    Potassium 4.2 3.5 - 5.2 mmol/L    Chloride 101 98 - 107 mmol/L    CO2 23.5 22.0 - 29.0 mmol/L    Calcium 9.2 8.6 - 10.5 mg/dL    Total Protein 7.3 6.0 - 8.5 g/dL    Albumin 4.50 3.50 - 5.20 g/dL    ALT (SGPT) 19 1 - 33 U/L    AST (SGOT) 14 1 - 32 U/L    Alkaline Phosphatase 75 39 - 117 U/L    Total Bilirubin 0.2 0.2 - 1.2 mg/dL    eGFR Non African Amer 112 >60 mL/min/1.73    Globulin 2.8 gm/dL    A/G Ratio 1.6 g/dL    BUN/Creatinine Ratio 26.6 (H) 7.0 - 25.0    Anion Gap 11.5 5.0 - 15.0 mmol/L   Results for orders placed or performed during the hospital encounter of 11/11/19   Tissue Pathology Exam   Result Value Ref Range    Case Report       Surgical Pathology Report                         Case: [id number]     "                              Authorizing Provider:  Sterling Neal MD        Collected:           11/11/2019 02:13 PM          Ordering Location:     [facility]             Received:            11/12/2019 06:59 AM                                 [place] [facility]                                                     Pathologist:           Porfirio Quarles MD                                                           Specimens:   1) - Gastric, Antrum                                                                                2) - Esophagus, Distal                                                                     Final Diagnosis       1.  GASTRIC ANTRUM, BIOPSY:  REACTIVE GASTROPATHY.    2.  DISTAL ESOPHAGUS, BIOPSY:  CHRONIC ESOPHAGITIS.      Gross Description       1.  Specimen #1 is labeled \"ant\".  Three tan fragments measure 0.5 x 0.3 x 0.2 cm together.  Totally submitted.    2.  Specimen #2 is labeled \"DE\".  Two gray fragments measure 0.6 x 0.5 x 0.1 cm together.  Totally submitted.     POC Glucose Once   Result Value Ref Range    Glucose 87 70 - 130 mg/dL   Results for orders placed or performed in visit on 10/25/19   Pregnancy, Urine - Urine, Clean Catch   Result Value Ref Range    HCG, Urine QL Negative Negative   Results for orders placed or performed during the hospital encounter of 08/28/19   Gold Top - SST   Result Value Ref Range    Extra Tube Hold for add-ons.    Green Top (Gel)   Result Value Ref Range    Extra Tube Hold for add-ons.    CBC Auto Differential   Result Value Ref Range    WBC 13.68 (H) 3.40 - 10.80 10*3/mm3    RBC 4.82 3.77 - 5.28 10*6/mm3    Hemoglobin 14.1 12.0 - 15.9 g/dL    Hematocrit 42.4 34.0 - 46.6 %    MCV 88.0 79.0 - 97.0 fL    MCH 29.3 26.6 - 33.0 pg    MCHC 33.3 31.5 - 35.7 g/dL    RDW 12.7 12.3 - 15.4 %    RDW-SD 41.1 37.0 - 54.0 fl    MPV 10.7 6.0 - 12.0 fL    Platelets 259 140 - 450 10*3/mm3    Neutrophil % 59.2 42.7 - 76.0 %    Lymphocyte % 32.9 19.6 - 45.3 % "    Monocyte % 3.9 (L) 5.0 - 12.0 %    Eosinophil % 3.1 0.3 - 6.2 %    Basophil % 0.5 0.0 - 1.5 %    Immature Grans % 0.4 0.0 - 0.5 %    Neutrophils, Absolute 8.09 (H) 1.70 - 7.00 10*3/mm3    Lymphocytes, Absolute 4.50 (H) 0.70 - 3.10 10*3/mm3    Monocytes, Absolute 0.53 0.10 - 0.90 10*3/mm3    Eosinophils, Absolute 0.43 (H) 0.00 - 0.40 10*3/mm3    Basophils, Absolute 0.07 0.00 - 0.20 10*3/mm3    Immature Grans, Absolute 0.06 (H) 0.00 - 0.05 10*3/mm3    nRBC 0.0 0.0 - 0.2 /100 WBC   Lavender Top   Result Value Ref Range    Extra Tube hold for add-on    Light Blue Top   Result Value Ref Range    Extra Tube hold for add-on    Troponin   Result Value Ref Range    Troponin T <0.010 0.000 - 0.030 ng/mL   Comprehensive Metabolic Panel   Result Value Ref Range    Glucose 85 65 - 99 mg/dL    BUN 10 6 - 20 mg/dL    Creatinine 0.72 0.57 - 1.00 mg/dL    Sodium 139 136 - 145 mmol/L    Potassium 3.9 3.5 - 5.2 mmol/L    Chloride 102 98 - 107 mmol/L    CO2 24.0 22.0 - 29.0 mmol/L    Calcium 9.3 8.6 - 10.5 mg/dL    Total Protein 8.0 6.0 - 8.5 g/dL    Albumin 4.60 3.50 - 5.20 g/dL    ALT (SGPT) 19 1 - 33 U/L    AST (SGOT) 18 1 - 32 U/L    Alkaline Phosphatase 79 39 - 117 U/L    Total Bilirubin 0.4 0.2 - 1.2 mg/dL    eGFR Non African Amer 98 >60 mL/min/1.73    Globulin 3.4 gm/dL    A/G Ratio 1.4 g/dL    BUN/Creatinine Ratio 13.9 7.0 - 25.0    Anion Gap 13.0 5.0 - 15.0 mmol/L   Results for orders placed or performed in visit on 02/12/19   Testosterone, F Eqlib & T LC / MS   Result Value Ref Range    Testosterone, Total 45.0 10.0 - 55.0 ng/dL    Testosterone, Free 0.59 0.10 - 0.85 ng/dL    Testosterone, Free % 1.32 0.50 - 2.80 %   Sex Horm Binding Globulin   Result Value Ref Range    Sex Hormone Binding Globulin 45.4 24.6 - 122.0 nmol/L   Progesterone   Result Value Ref Range    Progesterone 0.4 ng/mL   Insulin, Total   Result Value Ref Range    Insulin 57.1 (H) 2.6 - 24.9 uIU/mL   Estradiol   Result Value Ref Range    Estradiol 245.5  "pg/mL   TSH   Result Value Ref Range    TSH 1.390 0.460 - 4.680 mIU/mL   Luteinizing Hormone   Result Value Ref Range    LH 65.50 mIU/mL   Follicle Stimulating Hormone   Result Value Ref Range    FSH 11.20 mIU/mL   Results for orders placed or performed during the hospital encounter of 08/07/18   PREVIOUS HISTORY   Result Value Ref Range    Previous History Previous Record on File    Tissue Pathology Exam   Result Value Ref Range    Case Report       Surgical Pathology Report                         Case: [id number]                                  Authorizing Provider:  FridayElizabeth MD        Collected:           08/07/2018 07:44 AM          Ordering Location:     [facility]             Received:            08/07/2018 08:36 AM                                 [place] [facility]                                                              Pathologist:           Pedro Arteaga MD                                                         Specimen:    Fallopian Tubes, Bilateral                                                                 Final Diagnosis       FALLOPIAN TUBES (2), BILATERAL:  UNREMARKABLE FALLOPIAN TUBES (2).      Gross Description       The container is labeled \"bilateral fallopian tubes\" and has 7 segments of uterine tubal tissue.  The largest specimen measures 3.0 x 0.7 x 0.7 cm.  It is attached to a paratubal serous cyst measuring 0.9 cm in greatest dimension.  The second fragment of tissue has delicate fimbria and measures 2.0 x 0.7 x 0.7 cm.  The third fragment of tissue measures 4.0 x 0.8 x 0.7 cm.  Its fimbria are delicate.  The fourth fragment of tissue measures 2.0 x 0.8 x 0.7 cm.  The remaining 3 fragments measure 1.0 cc in aggregate.  Almost all of the tissue is embedded as 1A and 1B.     Antibody Identification   Result Value Ref Range    Anti-D ANTI-D    Type & Screen   Result Value Ref Range    ABO Type O     RH type Negative     Antibody Screen Positive     T&S Expiration Date " 8/10/2018 11:59:59 PM    Results for orders placed or performed in visit on 08/03/18   PREVIOUS HISTORY   Result Value Ref Range    Previous History Previous Record on File    CBC Auto Differential   Result Value Ref Range    WBC 10.27 (H) 3.20 - 9.80 10*3/mm3    RBC 4.60 3.77 - 5.16 10*6/mm3    Hemoglobin 14.1 12.0 - 15.5 g/dL    Hematocrit 41.5 35.0 - 45.0 %    MCV 90.2 80.0 - 98.0 fL    MCH 30.7 26.5 - 34.0 pg    MCHC 34.0 31.4 - 36.0 g/dL    RDW 12.4 11.5 - 14.5 %    RDW-SD 40.3 36.4 - 46.3 fl    MPV 10.4 8.0 - 12.0 fL    Platelets 292 150 - 450 10*3/mm3    Neutrophil % 56.3 37.0 - 80.0 %    Lymphocyte % 36.7 10.0 - 50.0 %    Monocyte % 4.7 0.0 - 12.0 %    Eosinophil % 1.9 0.0 - 7.0 %    Basophil % 0.1 0.0 - 2.0 %    Immature Grans % 0.3 0.0 - 0.5 %    Neutrophils, Absolute 5.79 2.00 - 8.60 10*3/mm3    Lymphocytes, Absolute 3.77 0.60 - 4.20 10*3/mm3    Monocytes, Absolute 0.48 0.00 - 0.90 10*3/mm3    Eosinophils, Absolute 0.19 0.00 - 0.70 10*3/mm3    Basophils, Absolute 0.01 0.00 - 0.20 10*3/mm3    Immature Grans, Absolute 0.03 (H) 0.00 - 0.02 10*3/mm3     *Note: Due to a large number of results and/or encounters for the requested time period, some results have not been displayed. A complete set of results can be found in Results Review.

## 2019-11-19 ENCOUNTER — TREATMENT (OUTPATIENT)
Dept: PHYSICAL THERAPY | Facility: CLINIC | Age: 24
End: 2019-11-19

## 2019-11-19 DIAGNOSIS — M53.3 SI (SACROILIAC) JOINT DYSFUNCTION: Primary | ICD-10-CM

## 2019-11-19 DIAGNOSIS — M43.16 SPONDYLOLISTHESIS OF LUMBAR REGION: ICD-10-CM

## 2019-11-19 LAB
ALBUMIN SERPL-MCNC: 4.5 G/DL (ref 3.5–5.2)
ALBUMIN/GLOB SERPL: 1.6 G/DL
ALP SERPL-CCNC: 75 U/L (ref 39–117)
ALT SERPL W P-5'-P-CCNC: 19 U/L (ref 1–33)
ANION GAP SERPL CALCULATED.3IONS-SCNC: 11.5 MMOL/L (ref 5–15)
AST SERPL-CCNC: 14 U/L (ref 1–32)
BILIRUB SERPL-MCNC: 0.2 MG/DL (ref 0.2–1.2)
BUN BLD-MCNC: 17 MG/DL (ref 6–20)
BUN/CREAT SERPL: 26.6 (ref 7–25)
CALCIUM SPEC-SCNC: 9.2 MG/DL (ref 8.6–10.5)
CHLORIDE SERPL-SCNC: 101 MMOL/L (ref 98–107)
CO2 SERPL-SCNC: 23.5 MMOL/L (ref 22–29)
CREAT BLD-MCNC: 0.64 MG/DL (ref 0.57–1)
GFR SERPL CREATININE-BSD FRML MDRD: 112 ML/MIN/1.73
GLOBULIN UR ELPH-MCNC: 2.8 GM/DL
GLUCOSE BLD-MCNC: 79 MG/DL (ref 65–99)
POTASSIUM BLD-SCNC: 4.2 MMOL/L (ref 3.5–5.2)
PROT SERPL-MCNC: 7.3 G/DL (ref 6–8.5)
SODIUM BLD-SCNC: 136 MMOL/L (ref 136–145)

## 2019-11-19 PROCEDURE — 97161 PT EVAL LOW COMPLEX 20 MIN: CPT | Performed by: PHYSICAL THERAPIST

## 2019-11-19 NOTE — PATIENT INSTRUCTIONS
HEP -  Mid back stretch, supine hamstring stretch, supine piriformis stretch, trans abs with add, quadratus stretch
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
## 2019-11-20 ENCOUNTER — HOSPITAL ENCOUNTER (OUTPATIENT)
Dept: MRI IMAGING | Facility: HOSPITAL | Age: 24
Discharge: HOME | End: 2019-11-20
Admitting: NURSE PRACTITIONER

## 2019-11-20 DIAGNOSIS — R93.2 ABNORMAL CT OF LIVER: ICD-10-CM

## 2019-11-20 PROCEDURE — 74181 MRI ABDOMEN W/O CONTRAST: CPT

## 2019-11-26 ENCOUNTER — OFFICE VISIT (OUTPATIENT)
Dept: GASTROENTEROLOGY | Facility: CLINIC | Age: 24
End: 2019-11-26

## 2019-11-26 ENCOUNTER — TREATMENT (OUTPATIENT)
Dept: PHYSICAL THERAPY | Facility: CLINIC | Age: 24
End: 2019-11-26

## 2019-11-26 VITALS
WEIGHT: 242.6 LBS | DIASTOLIC BLOOD PRESSURE: 70 MMHG | BODY MASS INDEX: 41.42 KG/M2 | SYSTOLIC BLOOD PRESSURE: 120 MMHG | HEIGHT: 64 IN | OXYGEN SATURATION: 98 % | HEART RATE: 76 BPM

## 2019-11-26 DIAGNOSIS — M53.3 SI (SACROILIAC) JOINT DYSFUNCTION: Primary | ICD-10-CM

## 2019-11-26 DIAGNOSIS — M43.16 SPONDYLOLISTHESIS OF LUMBAR REGION: ICD-10-CM

## 2019-11-26 DIAGNOSIS — R10.13 EPIGASTRIC PAIN: ICD-10-CM

## 2019-11-26 DIAGNOSIS — R93.2 ABNORMAL CT OF LIVER: Primary | ICD-10-CM

## 2019-11-26 PROCEDURE — 97110 THERAPEUTIC EXERCISES: CPT | Performed by: PHYSICAL THERAPIST

## 2019-11-26 PROCEDURE — 99213 OFFICE O/P EST LOW 20 MIN: CPT | Performed by: NURSE PRACTITIONER

## 2019-11-26 NOTE — PROGRESS NOTES
Physical Therapy Daily Progress Note      Patient: Marleny Conn   : 1995  Referring practitioner: Esteban Sands MD  Date of Initial Visit: Type: THERAPY  Noted: 2019  Today's Date: 2019  Patient seen for 2 sessions/3 scheduled (7) exp 1/3/20          Marleny Conn reports: Pain 7/10  Subjective Questionnaire:       Subjective     Objective   See Exercise, Manual, and Modality Logs for complete treatment.   NAG. Overweight and deconditioned. Poor core stability and strength.    Assessment/Plan  Pain with tensor stretch. Add Ice and stim after exercises. Check alignment.  Visit Diagnoses:    ICD-10-CM ICD-9-CM   1. SI (sacroiliac) joint dysfunction M53.3 724.6   2. Spondylolisthesis of lumbar region M43.16 738.4       Progress per Plan of Care and Progress strengthening /stabilization /functional activity           Timed:  Manual Therapy:         mins  13049;  Therapeutic Exercise:   47      mins  11997;     Neuromuscular Samantha:        mins  48789;    Therapeutic Activity:          mins  06237;     Gait Training:           mins  07782;     Ultrasound:          mins  70973;    Electrical Stimulation:         mins  25425 ( );    Untimed:  Electrical Stimulation:         mins  98277 ( );  Mechanical Traction:         mins  60674;     Timed Treatment:  47    mins   Total Treatment:     47  mins  Franny Barrow PT DPT  Physical Therapist

## 2019-11-26 NOTE — PROGRESS NOTES
Chief Complaint   Patient presents with   • Abdominal Pain     MRI results       Subjective    Marleny Conn is a 24 y.o. female. she is here today for follow-up.    Abdominal Pain   This is a recurrent problem. The current episode started more than 1 month ago. The onset quality is gradual. The problem occurs daily. The pain is located in the epigastric region. The pain is moderate. The quality of the pain is sharp. The abdominal pain radiates to the back and RUQ. Pertinent negatives include no anorexia (decreased appetite ), arthralgias, belching, constipation, diarrhea, fever, nausea or vomiting. The pain is aggravated by eating and movement. The pain is relieved by nothing. The treatment provided mild relief. Prior diagnostic workup includes GI consult, upper endoscopy and CT scan. Her past medical history is significant for GERD.   Patient attempted to have MRI of abdomen to evaluate hepatic lesion however they were unable to get an IV and right hepatic lesion still indeterminate may represent atypical hemangioma but a malignant process is not excluded at this point.  Radiologist recommended repeat follow-up liver protocol MRI if able to obtain IV access.  She reports pain is continual and states she has no appetite and frequent early satiety.  Her CT noted normal gallbladder.  She cannot tell significant difference with taking Protonix daily.       The following portions of the patient's history were reviewed and updated as appropriate:   Past Medical History:   Diagnosis Date   • Acid reflux    • Allergic rhinitis    • Anxiety    • Anxiety    • Bronchitis     probable   • Common cold    • Conjunctivitis    • Dysfunction of eustachian tube    • Examination of eyes and vision     general; NORMAL   • Gastroenteritis    • Headache    • Hemorrhoids     likely   • Infection of skin and subcutaneous tissue    • Migraine    • Nonvenomous insect bite    • Open wound of lower limb    • Pharyngitis    • Rhinitis     • Tick bite    • URI (upper respiratory infection)      Past Surgical History:   Procedure Laterality Date   • ENDOSCOPY N/A 2019    Procedure: ESOPHAGOGASTRODUODENOSCOPY;  Surgeon: Sterling Neal MD;  Location: [facility] ENDOSCOPY;  Service: Gastroenterology   • SALPINGECTOMY Bilateral 2018    Procedure: SALPINGECTOMY LAPAROSCOPIC;  Surgeon: FridayElizabeth MD;  Location: [facility] OR;  Service: Obstetrics/Gynecology   • WISDOM TOOTH EXTRACTION       Family History   Problem Relation Age of Onset   • Hypertension Brother    • Hypertension Paternal Grandmother    • Hypertension Maternal Grandmother    • Diabetes Maternal Grandmother    • No Known Problems Father    • No Known Problems Mother    • Heart attack Paternal Grandfather    • Hypertension Maternal Grandfather    • Sleep apnea Maternal Grandfather    • Heart disease Other    • Stroke Other    • Hypertension Other    • Diabetes Other    • Crohn's disease Other         Maternal Grandmother - Ana Rosa Santamaria   • Diverticulitis Other         Maternal Grandmother - Ana Rosa Santamaria   • Colon polyps Other         Maternal Grandmother - Ana Rosa Santamaria     OB History      Para Term  AB Living    2 2 2     2    SAB TAB Ectopic Molar Multiple Live Births            0 2        Prior to Admission medications    Medication Sig Start Date End Date Taking? Authorizing Provider   ibuprofen (ADVIL,MOTRIN) 600 MG tablet Take 1 tablet by mouth Every 8 (Eight) Hours As Needed for Mild Pain . 10/30/19  Yes Esteban Sands MD   metFORMIN (GLUCOPHAGE) 500 MG tablet Take 1 tablet by mouth Daily With Breakfast. 19 Yes Diandra Robins APRN   pantoprazole (PROTONIX) 40 MG EC tablet Take 1 tablet by mouth Daily. 19  Yes Lata Kenny APRN   sertraline (ZOLOFT) 50 MG tablet Take 1 tablet by mouth Daily. 10/30/19  Yes Esteban Sands MD     No Known Allergies  Social History     Socioeconomic History   • Marital status: Single     Spouse name:  "Not on file   • Number of children: 2   • Years of education: Not on file   • Highest education level: Not on file   Occupational History   • Occupation:       Employer: [employer]     Comment: Patient resides with children.   Tobacco Use   • Smoking status: Former Smoker     Packs/day: 1.00     Years: 13.00     Pack years: 13.00     Last attempt to quit: 2019     Years since quittin.1   • Smokeless tobacco: Never Used   Substance and Sexual Activity   • Alcohol use: No   • Drug use: No   • Sexual activity: Yes     Partners: Male     Birth control/protection: Tubal ligation       Review of Systems  Review of Systems   Constitutional: Positive for appetite change and fatigue. Negative for activity change, chills, diaphoresis, fever and unexpected weight change.   HENT: Negative for sore throat and trouble swallowing.    Respiratory: Negative for shortness of breath.    Gastrointestinal: Positive for abdominal pain. Negative for abdominal distention, anal bleeding, anorexia (decreased appetite ), blood in stool, constipation, diarrhea, nausea, rectal pain and vomiting.   Musculoskeletal: Negative for arthralgias.   Skin: Negative for pallor.   Neurological: Negative for light-headedness.        /70 (BP Location: Left arm, Patient Position: Sitting)   Pulse 76   Ht 162.6 cm (64\")   Wt 110 kg (242 lb 9.6 oz)   LMP 10/30/2019   SpO2 98%   BMI 41.64 kg/m²     Objective    Physical Exam   Constitutional: She is oriented to person, place, and time. She appears well-developed and well-nourished. She is cooperative. No distress.   HENT:   Head: Normocephalic and atraumatic.   Neck: Normal range of motion. Neck supple. No thyromegaly present.   Cardiovascular: Normal rate, regular rhythm and normal heart sounds.   Pulmonary/Chest: Effort normal and breath sounds normal. She has no wheezes. She has no rhonchi. She has no rales.   Abdominal: Soft. Normal appearance and bowel sounds are normal. She " exhibits no distension. There is no hepatosplenomegaly. There is tenderness in the right upper quadrant, epigastric area and left upper quadrant. There is no rigidity and no guarding. No hernia.   Lymphadenopathy:     She has no cervical adenopathy.   Neurological: She is alert and oriented to person, place, and time.   Skin: Skin is warm, dry and intact. No rash noted. No pallor.   Psychiatric: She has a normal mood and affect. Her speech is normal.     Office Visit on 11/18/2019   Component Date Value Ref Range Status   • Glucose 11/18/2019 79  65 - 99 mg/dL Final   • BUN 11/18/2019 17  6 - 20 mg/dL Final   • Creatinine 11/18/2019 0.64  0.57 - 1.00 mg/dL Final   • Sodium 11/18/2019 136  136 - 145 mmol/L Final   • Potassium 11/18/2019 4.2  3.5 - 5.2 mmol/L Final   • Chloride 11/18/2019 101  98 - 107 mmol/L Final   • CO2 11/18/2019 23.5  22.0 - 29.0 mmol/L Final   • Calcium 11/18/2019 9.2  8.6 - 10.5 mg/dL Final   • Total Protein 11/18/2019 7.3  6.0 - 8.5 g/dL Final   • Albumin 11/18/2019 4.50  3.50 - 5.20 g/dL Final   • ALT (SGPT) 11/18/2019 19  1 - 33 U/L Final   • AST (SGOT) 11/18/2019 14  1 - 32 U/L Final   • Alkaline Phosphatase 11/18/2019 75  39 - 117 U/L Final   • Total Bilirubin 11/18/2019 0.2  0.2 - 1.2 mg/dL Final   • eGFR Non African Amer 11/18/2019 112  >60 mL/min/1.73 Final   • Globulin 11/18/2019 2.8  gm/dL Final   • A/G Ratio 11/18/2019 1.6  g/dL Final   • BUN/Creatinine Ratio 11/18/2019 26.6* 7.0 - 25.0 Final   • Anion Gap 11/18/2019 11.5  5.0 - 15.0 mmol/L Final     Assessment/Plan      1. Abnormal CT of liver    2. Epigastric pain    .   We will plan repeat MRI due to inability to evaluate atypical hepatic lesion without contrast.  If this is normal we will proceed with HIDA scan to evaluate biliary function.  Follow-up after test return office sooner if needed    Orders placed during this encounter include:  Orders Placed This Encounter   Procedures   • MRI Abdomen With & Without Contrast      Standing Status:   Future     Standing Expiration Date:   11/26/2020     Order Specific Question:   Patient Pregnant     Answer:   No       * Surgery not found *    Review and/or summary of lab tests, radiology, procedures, medications. Review and summary of old records and obtaining of history. The risks and benefits of my recommendations, as well as other treatment options were discussed with the patient today. Questions were answered.    No orders of the defined types were placed in this encounter.      Follow-up: Return in about 2 weeks (around 12/10/2019).          This document has been electronically signed by PETTY Marshall on November 26, 2019 12:59 PM             Results for orders placed or performed in visit on 11/18/19   Comprehensive Metabolic Panel   Result Value Ref Range    Glucose 79 65 - 99 mg/dL    BUN 17 6 - 20 mg/dL    Creatinine 0.64 0.57 - 1.00 mg/dL    Sodium 136 136 - 145 mmol/L    Potassium 4.2 3.5 - 5.2 mmol/L    Chloride 101 98 - 107 mmol/L    CO2 23.5 22.0 - 29.0 mmol/L    Calcium 9.2 8.6 - 10.5 mg/dL    Total Protein 7.3 6.0 - 8.5 g/dL    Albumin 4.50 3.50 - 5.20 g/dL    ALT (SGPT) 19 1 - 33 U/L    AST (SGOT) 14 1 - 32 U/L    Alkaline Phosphatase 75 39 - 117 U/L    Total Bilirubin 0.2 0.2 - 1.2 mg/dL    eGFR Non African Amer 112 >60 mL/min/1.73    Globulin 2.8 gm/dL    A/G Ratio 1.6 g/dL    BUN/Creatinine Ratio 26.6 (H) 7.0 - 25.0    Anion Gap 11.5 5.0 - 15.0 mmol/L   Results for orders placed or performed during the hospital encounter of 11/11/19   Tissue Pathology Exam   Result Value Ref Range    Case Report       Surgical Pathology Report                         Case: [id number]                                  Authorizing Provider:  Sterling Neal MD        Collected:           11/11/2019 02:13 PM          Ordering Location:     [facility]             Received:            11/12/2019 06:59 AM                                 [facility]                           "                           Pathologist:           Porfirio Quarles MD                                                           Specimens:   1) - Gastric, Antrum                                                                                2) - Esophagus, Distal                                                                     Final Diagnosis       1.  GASTRIC ANTRUM, BIOPSY:  REACTIVE GASTROPATHY.    2.  DISTAL ESOPHAGUS, BIOPSY:  CHRONIC ESOPHAGITIS.      Gross Description       1.  Specimen #1 is labeled \"ant\".  Three tan fragments measure 0.5 x 0.3 x 0.2 cm together.  Totally submitted.    2.  Specimen #2 is labeled \"DE\".  Two gray fragments measure 0.6 x 0.5 x 0.1 cm together.  Totally submitted.     POC Glucose Once   Result Value Ref Range    Glucose 87 70 - 130 mg/dL   Results for orders placed or performed in visit on 10/25/19   Pregnancy, Urine - Urine, Clean Catch   Result Value Ref Range    HCG, Urine QL Negative Negative   Results for orders placed or performed during the hospital encounter of 08/28/19   Gold Top - SST   Result Value Ref Range    Extra Tube Hold for add-ons.    Green Top (Gel)   Result Value Ref Range    Extra Tube Hold for add-ons.    CBC Auto Differential   Result Value Ref Range    WBC 13.68 (H) 3.40 - 10.80 10*3/mm3    RBC 4.82 3.77 - 5.28 10*6/mm3    Hemoglobin 14.1 12.0 - 15.9 g/dL    Hematocrit 42.4 34.0 - 46.6 %    MCV 88.0 79.0 - 97.0 fL    MCH 29.3 26.6 - 33.0 pg    MCHC 33.3 31.5 - 35.7 g/dL    RDW 12.7 12.3 - 15.4 %    RDW-SD 41.1 37.0 - 54.0 fl    MPV 10.7 6.0 - 12.0 fL    Platelets 259 140 - 450 10*3/mm3    Neutrophil % 59.2 42.7 - 76.0 %    Lymphocyte % 32.9 19.6 - 45.3 %    Monocyte % 3.9 (L) 5.0 - 12.0 %    Eosinophil % 3.1 0.3 - 6.2 %    Basophil % 0.5 0.0 - 1.5 %    Immature Grans % 0.4 0.0 - 0.5 %    Neutrophils, Absolute 8.09 (H) 1.70 - 7.00 10*3/mm3    Lymphocytes, Absolute 4.50 (H) 0.70 - 3.10 10*3/mm3    Monocytes, Absolute 0.53 0.10 - 0.90 10*3/mm3    " Eosinophils, Absolute 0.43 (H) 0.00 - 0.40 10*3/mm3    Basophils, Absolute 0.07 0.00 - 0.20 10*3/mm3    Immature Grans, Absolute 0.06 (H) 0.00 - 0.05 10*3/mm3    nRBC 0.0 0.0 - 0.2 /100 WBC   Lavender Top   Result Value Ref Range    Extra Tube hold for add-on    Light Blue Top   Result Value Ref Range    Extra Tube hold for add-on    Troponin   Result Value Ref Range    Troponin T <0.010 0.000 - 0.030 ng/mL   Comprehensive Metabolic Panel   Result Value Ref Range    Glucose 85 65 - 99 mg/dL    BUN 10 6 - 20 mg/dL    Creatinine 0.72 0.57 - 1.00 mg/dL    Sodium 139 136 - 145 mmol/L    Potassium 3.9 3.5 - 5.2 mmol/L    Chloride 102 98 - 107 mmol/L    CO2 24.0 22.0 - 29.0 mmol/L    Calcium 9.3 8.6 - 10.5 mg/dL    Total Protein 8.0 6.0 - 8.5 g/dL    Albumin 4.60 3.50 - 5.20 g/dL    ALT (SGPT) 19 1 - 33 U/L    AST (SGOT) 18 1 - 32 U/L    Alkaline Phosphatase 79 39 - 117 U/L    Total Bilirubin 0.4 0.2 - 1.2 mg/dL    eGFR Non African Amer 98 >60 mL/min/1.73    Globulin 3.4 gm/dL    A/G Ratio 1.4 g/dL    BUN/Creatinine Ratio 13.9 7.0 - 25.0    Anion Gap 13.0 5.0 - 15.0 mmol/L   Results for orders placed or performed in visit on 02/12/19   Testosterone, F Eqlib & T LC / MS   Result Value Ref Range    Testosterone, Total 45.0 10.0 - 55.0 ng/dL    Testosterone, Free 0.59 0.10 - 0.85 ng/dL    Testosterone, Free % 1.32 0.50 - 2.80 %   Sex Horm Binding Globulin   Result Value Ref Range    Sex Hormone Binding Globulin 45.4 24.6 - 122.0 nmol/L   Progesterone   Result Value Ref Range    Progesterone 0.4 ng/mL   Insulin, Total   Result Value Ref Range    Insulin 57.1 (H) 2.6 - 24.9 uIU/mL   Estradiol   Result Value Ref Range    Estradiol 245.5 pg/mL   TSH   Result Value Ref Range    TSH 1.390 0.460 - 4.680 mIU/mL   Luteinizing Hormone   Result Value Ref Range    LH 65.50 mIU/mL   Follicle Stimulating Hormone   Result Value Ref Range    FSH 11.20 mIU/mL   Results for orders placed or performed during the hospital encounter of 08/07/18  "  PREVIOUS HISTORY   Result Value Ref Range    Previous History Previous Record on File    Tissue Pathology Exam   Result Value Ref Range    Case Report       Surgical Pathology Report                         Case: [id number]                                  Authorizing Provider:  FridayElizabeth MD        Collected:           08/07/2018 07:44 AM          Ordering Location:     [facility]             Received:            08/07/2018 08:36 AM                                 [facility]                                                              Pathologist:           Pedro Arteaga MD                                                         Specimen:    Fallopian Tubes, Bilateral                                                                 Final Diagnosis       FALLOPIAN TUBES (2), BILATERAL:  UNREMARKABLE FALLOPIAN TUBES (2).      Gross Description       The container is labeled \"bilateral fallopian tubes\" and has 7 segments of uterine tubal tissue.  The largest specimen measures 3.0 x 0.7 x 0.7 cm.  It is attached to a paratubal serous cyst measuring 0.9 cm in greatest dimension.  The second fragment of tissue has delicate fimbria and measures 2.0 x 0.7 x 0.7 cm.  The third fragment of tissue measures 4.0 x 0.8 x 0.7 cm.  Its fimbria are delicate.  The fourth fragment of tissue measures 2.0 x 0.8 x 0.7 cm.  The remaining 3 fragments measure 1.0 cc in aggregate.  Almost all of the tissue is embedded as 1A and 1B.     Antibody Identification   Result Value Ref Range    Anti-D ANTI-D    Type & Screen   Result Value Ref Range    ABO Type O     RH type Negative     Antibody Screen Positive     T&S Expiration Date 8/10/2018 11:59:59 PM    Results for orders placed or performed in visit on 08/03/18   PREVIOUS HISTORY   Result Value Ref Range    Previous History Previous Record on File    CBC Auto Differential   Result Value Ref Range    WBC 10.27 (H) 3.20 - 9.80 10*3/mm3    RBC 4.60 3.77 - 5.16 10*6/mm3    " Hemoglobin 14.1 12.0 - 15.5 g/dL    Hematocrit 41.5 35.0 - 45.0 %    MCV 90.2 80.0 - 98.0 fL    MCH 30.7 26.5 - 34.0 pg    MCHC 34.0 31.4 - 36.0 g/dL    RDW 12.4 11.5 - 14.5 %    RDW-SD 40.3 36.4 - 46.3 fl    MPV 10.4 8.0 - 12.0 fL    Platelets 292 150 - 450 10*3/mm3    Neutrophil % 56.3 37.0 - 80.0 %    Lymphocyte % 36.7 10.0 - 50.0 %    Monocyte % 4.7 0.0 - 12.0 %    Eosinophil % 1.9 0.0 - 7.0 %    Basophil % 0.1 0.0 - 2.0 %    Immature Grans % 0.3 0.0 - 0.5 %    Neutrophils, Absolute 5.79 2.00 - 8.60 10*3/mm3    Lymphocytes, Absolute 3.77 0.60 - 4.20 10*3/mm3    Monocytes, Absolute 0.48 0.00 - 0.90 10*3/mm3    Eosinophils, Absolute 0.19 0.00 - 0.70 10*3/mm3    Basophils, Absolute 0.01 0.00 - 0.20 10*3/mm3    Immature Grans, Absolute 0.03 (H) 0.00 - 0.02 10*3/mm3     *Note: Due to a large number of results and/or encounters for the requested time period, some results have not been displayed. A complete set of results can be found in Results Review.

## 2019-12-03 ENCOUNTER — APPOINTMENT (OUTPATIENT)
Dept: MRI IMAGING | Facility: HOSPITAL | Age: 24
End: 2019-12-03

## 2019-12-03 ENCOUNTER — TREATMENT (OUTPATIENT)
Dept: PHYSICAL THERAPY | Facility: CLINIC | Age: 24
End: 2019-12-03

## 2019-12-03 DIAGNOSIS — M53.3 SI (SACROILIAC) JOINT DYSFUNCTION: Primary | ICD-10-CM

## 2019-12-03 DIAGNOSIS — M43.16 SPONDYLOLISTHESIS OF LUMBAR REGION: ICD-10-CM

## 2019-12-03 PROCEDURE — 97014 ELECTRIC STIMULATION THERAPY: CPT | Performed by: PHYSICAL THERAPIST

## 2019-12-03 PROCEDURE — 97110 THERAPEUTIC EXERCISES: CPT | Performed by: PHYSICAL THERAPIST

## 2019-12-03 NOTE — PROGRESS NOTES
Physical Therapy Daily Progress Note      Patient: Marleny Conn   : 1995  Referring practitioner: Esteban Sands MD  Date of Initial Visit: Type: THERAPY  Noted: 2019  Today's Date: 12/3/2019  Patient seen for 3 sessions  Recert due:  12-10-19  MD followup:  kaelyn Conn reports: No improvement yet  Subjective Questionnaire:       Subjective  Pt reports having moderate pain at R SI and into hip.     Objective  Guarded ambulation.  R anterior rotated innominate.  TTP R PSIS and sacral base.     See Exercise, Manual, and Modality Logs for complete treatment.       Assessment & Plan     Assessment  Assessment details: Pt presented with anterior rotated R innominate that corrected easily with MET.  Very tender at R SI and along sacral base.  Initiated IFC/ice for pain relief.      Goals  Plan Goals: Plan Goals: 1.  Patient be independent home exercise program.  2.  Patient have lumbar flexion 6 inches from the floor.  3.  Patient have lateral flexion to the left without back pain.  4.  Patient have modified Oswestry score of 36% or less  5.  Patient report at least 50% subjective improvement.  6.  Patient will present with equal leg lengths x3 visits.  7.  Patient presents with level sacral base    Plan  Duration in visits: 12  Plan details: Cont monitoring alignment.          Visit Diagnoses:    ICD-10-CM ICD-9-CM   1. SI (sacroiliac) joint dysfunction M53.3 724.6   2. Spondylolisthesis of lumbar region M43.16 738.4                  Timed:  Manual Therapy:         mins  73911;  Therapeutic Exercise:    30     mins  09774;     Neuromuscular Samantha:        mins  61295;    Therapeutic Activity:          mins  69904;     Gait Training:           mins  55886;     Ultrasound:          mins  75955;    Electrical Stimulation:         mins  84873 ( );    Untimed:  Electrical Stimulation:    15     mins  54148 ( );  Mechanical Traction:         mins  59988;     Timed Treatment:   30    mins   Total Treatment:    45    mins  Cheryle Glover, PTA  Physical Therapist

## 2019-12-05 ENCOUNTER — TREATMENT (OUTPATIENT)
Dept: PHYSICAL THERAPY | Facility: CLINIC | Age: 24
End: 2019-12-05

## 2019-12-05 DIAGNOSIS — M53.3 SI (SACROILIAC) JOINT DYSFUNCTION: Primary | ICD-10-CM

## 2019-12-05 DIAGNOSIS — M43.16 SPONDYLOLISTHESIS OF LUMBAR REGION: ICD-10-CM

## 2019-12-05 PROCEDURE — 97110 THERAPEUTIC EXERCISES: CPT | Performed by: PHYSICAL THERAPIST

## 2019-12-05 PROCEDURE — 97140 MANUAL THERAPY 1/> REGIONS: CPT | Performed by: PHYSICAL THERAPIST

## 2019-12-05 PROCEDURE — 97014 ELECTRIC STIMULATION THERAPY: CPT | Performed by: PHYSICAL THERAPIST

## 2019-12-05 NOTE — PROGRESS NOTES
Physical Therapy Daily Progress Note      Patient: Marleny Conn   : 1995  Referring practitioner: Esteban Sands MD  Date of Initial Visit: Type: THERAPY  Noted: 2019  Today's Date: 2019  Patient seen for 4 sessions (7) exp /3  MD PRN  Recert 12/10       Marleny Conn reports: Pain 5/10 better      Subjective     Objective   See Exercise, Manual, and Modality Logs for complete treatment.       Assessment/Plan  Plan Goals:    1.  Patient be independent home exercise program. progressing  2.  Patient have lumbar flexion 6 inches from the floor.  3.  Patient have lateral flexion to the left without back pain.  4.  Patient have modified Oswestry score of 36% or less  5.  Patient report at least 50% subjective improvement. Not met  6.  Patient will present with equal leg lengths x3 visits. Not met  7.  Patient presents with level sacral base not  met  Visit Diagnoses:    ICD-10-CM ICD-9-CM   1. SI (sacroiliac) joint dysfunction M53.3 724.6   2. Spondylolisthesis of lumbar region M43.16 738.4       Progress per Plan of Care and Progress strengthening /stabilization /functional activity           Timed:  Manual Therapy:  10       mins  42920;  Therapeutic Exercise:   30     mins  23864;     Neuromuscular Samantha:        mins  63896;    Therapeutic Activity:          mins  17588;     Gait Training:           mins  74513;     Ultrasound:          mins  39438;    Electrical Stimulation:         mins  57583 ( );    Untimed:  Electrical Stimulation:    15     mins  96726 ( );  Mechanical Traction:         mins  94044;     Timed Treatment: 40     mins   Total Treatment:  55      mins  Franny Barrow PT DPT  Physical Therapist

## 2019-12-10 ENCOUNTER — HOSPITAL ENCOUNTER (OUTPATIENT)
Dept: MRI IMAGING | Facility: HOSPITAL | Age: 24
Discharge: HOME | End: 2019-12-10
Admitting: NURSE PRACTITIONER

## 2019-12-10 DIAGNOSIS — R93.2 ABNORMAL CT OF LIVER: ICD-10-CM

## 2019-12-10 DIAGNOSIS — R10.13 EPIGASTRIC PAIN: ICD-10-CM

## 2019-12-10 PROCEDURE — A9581 GADOXETATE DISODIUM INJ: HCPCS | Performed by: NURSE PRACTITIONER

## 2019-12-10 PROCEDURE — 25010000002 GADOXETATE 0.25 MOL/L SOLUTION: Performed by: NURSE PRACTITIONER

## 2019-12-10 PROCEDURE — 74183 MRI ABD W/O CNTR FLWD CNTR: CPT

## 2019-12-10 RX ADMIN — GADOXETATE DISODIUM 10 ML: 181.43 INJECTION, SOLUTION INTRAVENOUS at 11:34

## 2019-12-11 ENCOUNTER — HOSPITAL ENCOUNTER (OUTPATIENT)
Dept: SLEEP MEDICINE | Facility: HOSPITAL | Age: 24
Discharge: HOME | End: 2019-12-11

## 2019-12-11 DIAGNOSIS — G47.33 OBSTRUCTIVE SLEEP APNEA, ADULT: ICD-10-CM

## 2019-12-11 PROCEDURE — 95806 SLEEP STUDY UNATT&RESP EFFT: CPT

## 2019-12-12 ENCOUNTER — OFFICE VISIT (OUTPATIENT)
Dept: GASTROENTEROLOGY | Facility: CLINIC | Age: 24
End: 2019-12-12

## 2019-12-12 VITALS
WEIGHT: 245.2 LBS | SYSTOLIC BLOOD PRESSURE: 134 MMHG | DIASTOLIC BLOOD PRESSURE: 84 MMHG | HEIGHT: 64 IN | HEART RATE: 74 BPM | BODY MASS INDEX: 41.86 KG/M2

## 2019-12-12 DIAGNOSIS — K76.9 LESION OF LIVER GREATER THAN 1 CM IN DIAMETER WITH NO LIVER DISEASE OR HISTORY OF MALIGNANT NEOPLASM: ICD-10-CM

## 2019-12-12 DIAGNOSIS — R10.10 UPPER ABDOMINAL PAIN: ICD-10-CM

## 2019-12-12 DIAGNOSIS — K21.00 GASTROESOPHAGEAL REFLUX DISEASE WITH ESOPHAGITIS: Primary | ICD-10-CM

## 2019-12-12 DIAGNOSIS — R11.0 NAUSEA: ICD-10-CM

## 2019-12-12 PROCEDURE — 99213 OFFICE O/P EST LOW 20 MIN: CPT | Performed by: NURSE PRACTITIONER

## 2019-12-12 RX ORDER — SUCRALFATE 1 G/1
1 TABLET ORAL 4 TIMES DAILY
Qty: 120 TABLET | Refills: 2 | Status: SHIPPED | OUTPATIENT
Start: 2019-12-12 | End: 2020-02-12

## 2019-12-12 NOTE — PATIENT INSTRUCTIONS

## 2019-12-12 NOTE — PROGRESS NOTES
Chief Complaint   Patient presents with   • Abdominal Pain       Subjective    Marleny Conn is a 24 y.o. female. she is here today for follow-up.  24-year-old female presents to discuss MRCP results along with upper abdominal pain and nausea that has recently worsened.  Previous MRI had to be completed without contrast and recommended follow-up with contrast when able to access IV.  Repeat MRI notes stable lesion of liver 1 cm characteristics most likely benign hemangioma but recommend repeat in 1 year to document stability.  Reports she still having early satiety nausea and vomiting and upper abdominal pain.  Her gallbladder appeared normal on MRI    Abdominal Pain   Pertinent negatives include no belching or nausea. Her past medical history is significant for GERD.   Heartburn   She complains of abdominal pain, early satiety and heartburn. She reports no belching, no chest pain, no choking, no coughing, no dysphagia, no globus sensation, no hoarse voice or no nausea. This is a chronic problem.            The following portions of the patient's history were reviewed and updated as appropriate:   Past Medical History:   Diagnosis Date   • Acid reflux    • Allergic rhinitis    • Anxiety    • Anxiety    • Bronchitis     probable   • Common cold    • Conjunctivitis    • Dysfunction of eustachian tube    • Examination of eyes and vision     general; NORMAL   • Gastroenteritis    • Headache    • Hemorrhoids     likely   • Infection of skin and subcutaneous tissue    • Migraine    • Nonvenomous insect bite    • Open wound of lower limb    • Pharyngitis    • Rhinitis    • Tick bite    • URI (upper respiratory infection)      Past Surgical History:   Procedure Laterality Date   • ENDOSCOPY N/A 11/11/2019    Procedure: ESOPHAGOGASTRODUODENOSCOPY;  Surgeon: Sterling Neal MD;  Location: [facility] ENDOSCOPY;  Service: Gastroenterology   • SALPINGECTOMY Bilateral 8/7/2018    Procedure: SALPINGECTOMY LAPAROSCOPIC;   Surgeon: Elizabeth Kenney MD;  Location: [facility];  Service: Obstetrics/Gynecology   • WISDOM TOOTH EXTRACTION       Family History   Problem Relation Age of Onset   • Hypertension Brother    • Hypertension Paternal Grandmother    • Hypertension Maternal Grandmother    • Diabetes Maternal Grandmother    • No Known Problems Father    • No Known Problems Mother    • Heart attack Paternal Grandfather    • Hypertension Maternal Grandfather    • Sleep apnea Maternal Grandfather    • Heart disease Other    • Stroke Other    • Hypertension Other    • Diabetes Other    • Crohn's disease Other         Maternal Grandmother - Ana Rosa Santamaria   • Diverticulitis Other         Maternal Grandmother - Ana Rosa Santamaria   • Colon polyps Other         Maternal Grandmother - Ana Rosa Santamaria     OB History        2    Para   2    Term   2            AB        Living   2       SAB        TAB        Ectopic        Molar        Multiple   0    Live Births   2              Prior to Admission medications    Medication Sig Start Date End Date Taking? Authorizing Provider   ibuprofen (ADVIL,MOTRIN) 600 MG tablet Take 1 tablet by mouth Every 8 (Eight) Hours As Needed for Mild Pain . 10/30/19  Yes Esteban Sands MD   metFORMIN (GLUCOPHAGE) 500 MG tablet Take 1 tablet by mouth Daily With Breakfast. 19 Yes Diandra Robins APRN   pantoprazole (PROTONIX) 40 MG EC tablet Take 1 tablet by mouth Daily. 19  Yes Lata Kenny APRN   sertraline (ZOLOFT) 50 MG tablet Take 1 tablet by mouth Daily. 10/30/19  Yes Esteban Sands MD     No Known Allergies  Social History     Socioeconomic History   • Marital status: Single     Spouse name: Not on file   • Number of children: 2   • Years of education: Not on file   • Highest education level: Not on file   Occupational History   • Occupation:       Employer: [employer]     Comment: Patient resides with children.   Tobacco Use   • Smoking status: Former Smoker      "Packs/day: 1.00     Years: 13.00     Pack years: 13.00     Last attempt to quit: 2019     Years since quittin.2   • Smokeless tobacco: Never Used   Substance and Sexual Activity   • Alcohol use: No   • Drug use: No   • Sexual activity: Yes     Partners: Male     Birth control/protection: Tubal ligation       Review of Systems  Review of Systems   HENT: Negative for hoarse voice.    Respiratory: Negative for cough and choking.    Cardiovascular: Negative for chest pain.   Gastrointestinal: Positive for abdominal pain and heartburn. Negative for dysphagia and nausea.        /84 (BP Location: Left arm)   Pulse 74   Ht 162.6 cm (64\")   Wt 111 kg (245 lb 3.2 oz)   BMI 42.09 kg/m²     Objective    Physical Exam   Constitutional: She is oriented to person, place, and time. She appears well-developed and well-nourished. She is cooperative. No distress.   HENT:   Head: Normocephalic and atraumatic.   Neck: Normal range of motion. Neck supple. No thyromegaly present.   Cardiovascular: Normal rate, regular rhythm and normal heart sounds.   Pulmonary/Chest: Effort normal and breath sounds normal. She has no wheezes. She has no rhonchi. She has no rales.   Abdominal: Soft. Normal appearance and bowel sounds are normal. She exhibits no distension. There is no hepatosplenomegaly. There is tenderness in the right upper quadrant, epigastric area and left upper quadrant. There is no rigidity and no guarding. No hernia.   Lymphadenopathy:     She has no cervical adenopathy.   Neurological: She is alert and oriented to person, place, and time.   Skin: Skin is warm, dry and intact. No rash noted. No pallor.   Psychiatric: She has a normal mood and affect. Her speech is normal.     Office Visit on 2019   Component Date Value Ref Range Status   • Glucose 2019 79  65 - 99 mg/dL Final   • BUN 2019 17  6 - 20 mg/dL Final   • Creatinine 2019 0.64  0.57 - 1.00 mg/dL Final   • Sodium 2019 136  " 136 - 145 mmol/L Final   • Potassium 11/18/2019 4.2  3.5 - 5.2 mmol/L Final   • Chloride 11/18/2019 101  98 - 107 mmol/L Final   • CO2 11/18/2019 23.5  22.0 - 29.0 mmol/L Final   • Calcium 11/18/2019 9.2  8.6 - 10.5 mg/dL Final   • Total Protein 11/18/2019 7.3  6.0 - 8.5 g/dL Final   • Albumin 11/18/2019 4.50  3.50 - 5.20 g/dL Final   • ALT (SGPT) 11/18/2019 19  1 - 33 U/L Final   • AST (SGOT) 11/18/2019 14  1 - 32 U/L Final   • Alkaline Phosphatase 11/18/2019 75  39 - 117 U/L Final   • Total Bilirubin 11/18/2019 0.2  0.2 - 1.2 mg/dL Final   • eGFR Non African Amer 11/18/2019 112  >60 mL/min/1.73 Final   • Globulin 11/18/2019 2.8  gm/dL Final   • A/G Ratio 11/18/2019 1.6  g/dL Final   • BUN/Creatinine Ratio 11/18/2019 26.6* 7.0 - 25.0 Final   • Anion Gap 11/18/2019 11.5  5.0 - 15.0 mmol/L Final     Assessment/Plan      1. Gastroesophageal reflux disease with esophagitis    2. Nausea    3. Upper abdominal pain    4. Lesion of liver greater than 1 cm in diameter with no liver disease or history of malignant neoplasm    .   We will add Carafate as reflux may be worsening symptoms.  We will also obtain HIDA scan due to nausea upper abdominal pain with intake.  Follow-up after test for an office and if needed we will plan repeat MRCP in 1 year for surveillance of liver lesion.    Orders placed during this encounter include:  Orders Placed This Encounter   Procedures   • NM Hepatobiliary Without CCK     Standing Status:   Future     Standing Expiration Date:   12/12/2020     Order Specific Question:   Patient Pregnant     Answer:   No       * Surgery not found *    Review and/or summary of lab tests, radiology, procedures, medications. Review and summary of old records and obtaining of history. The risks and benefits of my recommendations, as well as other treatment options were discussed with the patient today. Questions were answered.    New Medications Ordered This Visit   Medications   • sucralfate (CARAFATE) 1 g  tablet     Sig: Take 1 tablet by mouth 4 (Four) Times a Day.     Dispense:  120 tablet     Refill:  2       Follow-up: Return in about 4 weeks (around 1/9/2020) for After test.          This document has been electronically signed by PETTY Marshall on December 13, 2019 10:48 AM             Results for orders placed or performed in visit on 11/18/19   Comprehensive Metabolic Panel   Result Value Ref Range    Glucose 79 65 - 99 mg/dL    BUN 17 6 - 20 mg/dL    Creatinine 0.64 0.57 - 1.00 mg/dL    Sodium 136 136 - 145 mmol/L    Potassium 4.2 3.5 - 5.2 mmol/L    Chloride 101 98 - 107 mmol/L    CO2 23.5 22.0 - 29.0 mmol/L    Calcium 9.2 8.6 - 10.5 mg/dL    Total Protein 7.3 6.0 - 8.5 g/dL    Albumin 4.50 3.50 - 5.20 g/dL    ALT (SGPT) 19 1 - 33 U/L    AST (SGOT) 14 1 - 32 U/L    Alkaline Phosphatase 75 39 - 117 U/L    Total Bilirubin 0.2 0.2 - 1.2 mg/dL    eGFR Non African Amer 112 >60 mL/min/1.73    Globulin 2.8 gm/dL    A/G Ratio 1.6 g/dL    BUN/Creatinine Ratio 26.6 (H) 7.0 - 25.0    Anion Gap 11.5 5.0 - 15.0 mmol/L   Results for orders placed or performed during the hospital encounter of 11/11/19   Tissue Pathology Exam   Result Value Ref Range    Case Report       Surgical Pathology Report                         Case: [id number]                                  Authorizing Provider:  Sterling Neal MD        Collected:           11/11/2019 02:13 PM          Ordering Location:     [facility]             Received:            11/12/2019 06:59 AM                                 [place] [facility]                                                     Pathologist:           Porfirio Quarles MD                                                           Specimens:   1) - Gastric, Antrum                                                                                2) - Esophagus, Distal                                                                     Final Diagnosis       1.  GASTRIC ANTRUM, BIOPSY:  REACTIVE  "GASTROPATHY.    2.  DISTAL ESOPHAGUS, BIOPSY:  CHRONIC ESOPHAGITIS.      Gross Description       1.  Specimen #1 is labeled \"ant\".  Three tan fragments measure 0.5 x 0.3 x 0.2 cm together.  Totally submitted.    2.  Specimen #2 is labeled \"DE\".  Two gray fragments measure 0.6 x 0.5 x 0.1 cm together.  Totally submitted.     POC Glucose Once   Result Value Ref Range    Glucose 87 70 - 130 mg/dL   Results for orders placed or performed in visit on 10/25/19   Pregnancy, Urine - Urine, Clean Catch   Result Value Ref Range    HCG, Urine QL Negative Negative   Results for orders placed or performed during the hospital encounter of 08/28/19   Gold Top - SST   Result Value Ref Range    Extra Tube Hold for add-ons.    Green Top (Gel)   Result Value Ref Range    Extra Tube Hold for add-ons.    CBC Auto Differential   Result Value Ref Range    WBC 13.68 (H) 3.40 - 10.80 10*3/mm3    RBC 4.82 3.77 - 5.28 10*6/mm3    Hemoglobin 14.1 12.0 - 15.9 g/dL    Hematocrit 42.4 34.0 - 46.6 %    MCV 88.0 79.0 - 97.0 fL    MCH 29.3 26.6 - 33.0 pg    MCHC 33.3 31.5 - 35.7 g/dL    RDW 12.7 12.3 - 15.4 %    RDW-SD 41.1 37.0 - 54.0 fl    MPV 10.7 6.0 - 12.0 fL    Platelets 259 140 - 450 10*3/mm3    Neutrophil % 59.2 42.7 - 76.0 %    Lymphocyte % 32.9 19.6 - 45.3 %    Monocyte % 3.9 (L) 5.0 - 12.0 %    Eosinophil % 3.1 0.3 - 6.2 %    Basophil % 0.5 0.0 - 1.5 %    Immature Grans % 0.4 0.0 - 0.5 %    Neutrophils, Absolute 8.09 (H) 1.70 - 7.00 10*3/mm3    Lymphocytes, Absolute 4.50 (H) 0.70 - 3.10 10*3/mm3    Monocytes, Absolute 0.53 0.10 - 0.90 10*3/mm3    Eosinophils, Absolute 0.43 (H) 0.00 - 0.40 10*3/mm3    Basophils, Absolute 0.07 0.00 - 0.20 10*3/mm3    Immature Grans, Absolute 0.06 (H) 0.00 - 0.05 10*3/mm3    nRBC 0.0 0.0 - 0.2 /100 WBC   Lavender Top   Result Value Ref Range    Extra Tube hold for add-on    Light Blue Top   Result Value Ref Range    Extra Tube hold for add-on    Troponin   Result Value Ref Range    Troponin T <0.010 0.000 - " 0.030 ng/mL   Comprehensive Metabolic Panel   Result Value Ref Range    Glucose 85 65 - 99 mg/dL    BUN 10 6 - 20 mg/dL    Creatinine 0.72 0.57 - 1.00 mg/dL    Sodium 139 136 - 145 mmol/L    Potassium 3.9 3.5 - 5.2 mmol/L    Chloride 102 98 - 107 mmol/L    CO2 24.0 22.0 - 29.0 mmol/L    Calcium 9.3 8.6 - 10.5 mg/dL    Total Protein 8.0 6.0 - 8.5 g/dL    Albumin 4.60 3.50 - 5.20 g/dL    ALT (SGPT) 19 1 - 33 U/L    AST (SGOT) 18 1 - 32 U/L    Alkaline Phosphatase 79 39 - 117 U/L    Total Bilirubin 0.4 0.2 - 1.2 mg/dL    eGFR Non African Amer 98 >60 mL/min/1.73    Globulin 3.4 gm/dL    A/G Ratio 1.4 g/dL    BUN/Creatinine Ratio 13.9 7.0 - 25.0    Anion Gap 13.0 5.0 - 15.0 mmol/L   Results for orders placed or performed in visit on 02/12/19   Testosterone, F Eqlib & T LC / MS   Result Value Ref Range    Testosterone, Total 45.0 10.0 - 55.0 ng/dL    Testosterone, Free 0.59 0.10 - 0.85 ng/dL    Testosterone, Free % 1.32 0.50 - 2.80 %   Sex Horm Binding Globulin   Result Value Ref Range    Sex Hormone Binding Globulin 45.4 24.6 - 122.0 nmol/L   Progesterone   Result Value Ref Range    Progesterone 0.4 ng/mL   Insulin, Total   Result Value Ref Range    Insulin 57.1 (H) 2.6 - 24.9 uIU/mL   Estradiol   Result Value Ref Range    Estradiol 245.5 pg/mL   TSH   Result Value Ref Range    TSH 1.390 0.460 - 4.680 mIU/mL   Luteinizing Hormone   Result Value Ref Range    LH 65.50 mIU/mL   Follicle Stimulating Hormone   Result Value Ref Range    FSH 11.20 mIU/mL   Results for orders placed or performed during the hospital encounter of 08/07/18   PREVIOUS HISTORY   Result Value Ref Range    Previous History Previous Record on File    Tissue Pathology Exam   Result Value Ref Range    Case Report       Surgical Pathology Report                         Case: [id number]                                  Authorizing Provider:  FridayElizabeth MD        Collected:           08/07/2018 07:44 AM          Ordering Location:     [facility]     "         Received:            08/07/2018 08:36 AM                                 [place] [facility]                                                              Pathologist:           Pedro Arteaga MD                                                         Specimen:    Fallopian Tubes, Bilateral                                                                 Final Diagnosis       FALLOPIAN TUBES (2), BILATERAL:  UNREMARKABLE FALLOPIAN TUBES (2).      Gross Description       The container is labeled \"bilateral fallopian tubes\" and has 7 segments of uterine tubal tissue.  The largest specimen measures 3.0 x 0.7 x 0.7 cm.  It is attached to a paratubal serous cyst measuring 0.9 cm in greatest dimension.  The second fragment of tissue has delicate fimbria and measures 2.0 x 0.7 x 0.7 cm.  The third fragment of tissue measures 4.0 x 0.8 x 0.7 cm.  Its fimbria are delicate.  The fourth fragment of tissue measures 2.0 x 0.8 x 0.7 cm.  The remaining 3 fragments measure 1.0 cc in aggregate.  Almost all of the tissue is embedded as 1A and 1B.     Antibody Identification   Result Value Ref Range    Anti-D ANTI-D    Type & Screen   Result Value Ref Range    ABO Type O     RH type Negative     Antibody Screen Positive     T&S Expiration Date 8/10/2018 11:59:59 PM    Results for orders placed or performed in visit on 08/03/18   PREVIOUS HISTORY   Result Value Ref Range    Previous History Previous Record on File    CBC Auto Differential   Result Value Ref Range    WBC 10.27 (H) 3.20 - 9.80 10*3/mm3    RBC 4.60 3.77 - 5.16 10*6/mm3    Hemoglobin 14.1 12.0 - 15.5 g/dL    Hematocrit 41.5 35.0 - 45.0 %    MCV 90.2 80.0 - 98.0 fL    MCH 30.7 26.5 - 34.0 pg    MCHC 34.0 31.4 - 36.0 g/dL    RDW 12.4 11.5 - 14.5 %    RDW-SD 40.3 36.4 - 46.3 fl    MPV 10.4 8.0 - 12.0 fL    Platelets 292 150 - 450 10*3/mm3    Neutrophil % 56.3 37.0 - 80.0 %    Lymphocyte % 36.7 10.0 - 50.0 %    Monocyte % 4.7 0.0 - 12.0 %    Eosinophil % 1.9 0.0 - 7.0 % "    Basophil % 0.1 0.0 - 2.0 %    Immature Grans % 0.3 0.0 - 0.5 %    Neutrophils, Absolute 5.79 2.00 - 8.60 10*3/mm3    Lymphocytes, Absolute 3.77 0.60 - 4.20 10*3/mm3    Monocytes, Absolute 0.48 0.00 - 0.90 10*3/mm3    Eosinophils, Absolute 0.19 0.00 - 0.70 10*3/mm3    Basophils, Absolute 0.01 0.00 - 0.20 10*3/mm3    Immature Grans, Absolute 0.03 (H) 0.00 - 0.02 10*3/mm3     *Note: Due to a large number of results and/or encounters for the requested time period, some results have not been displayed. A complete set of results can be found in Results Review.

## 2019-12-20 ENCOUNTER — APPOINTMENT (OUTPATIENT)
Dept: NUCLEAR MEDICINE | Facility: HOSPITAL | Age: 24
End: 2019-12-20

## 2020-01-02 ENCOUNTER — HOSPITAL ENCOUNTER (EMERGENCY)
Facility: HOSPITAL | Age: 25
Discharge: LEFT BEFORE DISCHARGE | End: 2020-01-02

## 2020-01-02 VITALS
HEIGHT: 65 IN | OXYGEN SATURATION: 100 % | WEIGHT: 249.5 LBS | DIASTOLIC BLOOD PRESSURE: 86 MMHG | SYSTOLIC BLOOD PRESSURE: 122 MMHG | RESPIRATION RATE: 16 BRPM | HEART RATE: 80 BPM | BODY MASS INDEX: 41.57 KG/M2 | TEMPERATURE: 97.7 F

## 2020-01-02 LAB
B-HCG UR QL: NEGATIVE
BILIRUB UR QL STRIP: NEGATIVE
CLARITY UR: CLEAR
COLOR UR: YELLOW
GLUCOSE UR STRIP-MCNC: NEGATIVE MG/DL
HGB UR QL STRIP.AUTO: NEGATIVE
KETONES UR QL STRIP: NEGATIVE
LEUKOCYTE ESTERASE UR QL STRIP.AUTO: NEGATIVE
NITRITE UR QL STRIP: NEGATIVE
PH UR STRIP.AUTO: <=5 [PH] (ref 5–9)
PROT UR QL STRIP: NEGATIVE
SP GR UR STRIP: 1.02 (ref 1–1.03)
UROBILINOGEN UR QL STRIP: NORMAL

## 2020-01-02 PROCEDURE — 99211 OFF/OP EST MAY X REQ PHY/QHP: CPT

## 2020-01-02 PROCEDURE — 81025 URINE PREGNANCY TEST: CPT | Performed by: EMERGENCY MEDICINE

## 2020-01-02 PROCEDURE — 81003 URINALYSIS AUTO W/O SCOPE: CPT | Performed by: EMERGENCY MEDICINE

## 2020-01-02 NOTE — ED TRIAGE NOTES
Pt presents to ED with c/o upper abdominal pain that radiates to her back with nausea and vomiting.

## 2020-01-06 ENCOUNTER — OFFICE VISIT (OUTPATIENT)
Dept: GASTROENTEROLOGY | Facility: CLINIC | Age: 25
End: 2020-01-06

## 2020-01-06 VITALS
DIASTOLIC BLOOD PRESSURE: 68 MMHG | SYSTOLIC BLOOD PRESSURE: 128 MMHG | HEART RATE: 72 BPM | WEIGHT: 247.6 LBS | BODY MASS INDEX: 42.27 KG/M2 | HEIGHT: 64 IN

## 2020-01-06 DIAGNOSIS — R19.4 CHANGE IN BOWEL HABITS: ICD-10-CM

## 2020-01-06 DIAGNOSIS — R19.7 DIARRHEA, UNSPECIFIED TYPE: ICD-10-CM

## 2020-01-06 DIAGNOSIS — R10.84 GENERALIZED ABDOMINAL PAIN: ICD-10-CM

## 2020-01-06 DIAGNOSIS — R11.0 NAUSEA: Primary | ICD-10-CM

## 2020-01-06 PROCEDURE — 99214 OFFICE O/P EST MOD 30 MIN: CPT | Performed by: NURSE PRACTITIONER

## 2020-01-06 RX ORDER — AZITHROMYCIN 500 MG/1
TABLET, FILM COATED ORAL
COMMUNITY
Start: 2020-01-03 | End: 2020-01-09

## 2020-01-06 RX ORDER — DEXTROSE AND SODIUM CHLORIDE 5; .45 G/100ML; G/100ML
30 INJECTION, SOLUTION INTRAVENOUS CONTINUOUS PRN
Status: CANCELLED | OUTPATIENT
Start: 2020-01-15

## 2020-01-06 RX ORDER — PREDNISONE 20 MG/1
TABLET ORAL
COMMUNITY
Start: 2020-01-03 | End: 2020-01-09

## 2020-01-06 RX ORDER — SODIUM CHLORIDE 0.9 % (FLUSH) 0.9 %
10 SYRINGE (ML) INJECTION AS NEEDED
Status: CANCELLED | OUTPATIENT
Start: 2020-01-15

## 2020-01-06 RX ORDER — SODIUM CHLORIDE 0.9 % (FLUSH) 0.9 %
3 SYRINGE (ML) INJECTION EVERY 12 HOURS SCHEDULED
Status: CANCELLED | OUTPATIENT
Start: 2020-01-15

## 2020-01-06 NOTE — PROGRESS NOTES
Chief Complaint   Patient presents with   • Heartburn   • Abdominal Pain       Subjective    Marleny Conn is a 24 y.o. female. she is here today for follow-up.  24-year-old female presents for follow-up regarding abdominal pain and now would like to discuss change in bowel habits to frequent diarrhea.  She has family history of Crohn's in her grandmother.     Diarrhea    This is a new problem. The current episode started more than 1 month ago. The problem occurs 5 to 10 times per day. The problem has been waxing and waning. The stool consistency is described as watery. The patient states that diarrhea awakens her from sleep. Associated symptoms include abdominal pain, bloating, increased flatus, sweats and vomiting. Pertinent negatives include no arthralgias, chills, coughing, fever, headaches, myalgias, URI or weight loss. Exacerbated by: spicy greasy foods, corn. The treatment provided mild relief.     At last visit she was still having right upper quadrant tenderness and we ordered HIDA scan which was not approved by her insurance so she did not have that exam completed.  MRI of the liver due to small lesion noted stable 1 cm lesion likely benign hemangioma but recommend repeat in 1 year to document stability.       The following portions of the patient's history were reviewed and updated as appropriate:   Past Medical History:   Diagnosis Date   • Acid reflux    • Allergic rhinitis    • Anxiety    • Anxiety    • Bronchitis     probable   • Common cold    • Conjunctivitis    • Dysfunction of eustachian tube    • Examination of eyes and vision     general; NORMAL   • Gastroenteritis    • Headache    • Hemorrhoids     likely   • Infection of skin and subcutaneous tissue    • Migraine    • Nonvenomous insect bite    • Open wound of lower limb    • Pharyngitis    • Rhinitis    • Tick bite    • URI (upper respiratory infection)      Past Surgical History:   Procedure Laterality Date   • ENDOSCOPY N/A 11/11/2019     Procedure: ESOPHAGOGASTRODUODENOSCOPY;  Surgeon: Sterling Neal MD;  Location: [facility] ENDOSCOPY;  Service: Gastroenterology   • SALPINGECTOMY Bilateral 2018    Procedure: SALPINGECTOMY LAPAROSCOPIC;  Surgeon: Elizabeth Kenney MD;  Location: [facility] OR;  Service: Obstetrics/Gynecology   • WISDOM TOOTH EXTRACTION       Family History   Problem Relation Age of Onset   • Hypertension Brother    • Hypertension Paternal Grandmother    • Hypertension Maternal Grandmother    • Diabetes Maternal Grandmother    • No Known Problems Father    • No Known Problems Mother    • Heart attack Paternal Grandfather    • Hypertension Maternal Grandfather    • Sleep apnea Maternal Grandfather    • Heart disease Other    • Stroke Other    • Hypertension Other    • Diabetes Other    • Crohn's disease Other         Maternal Grandmother - Ana Rosa Santamaria   • Diverticulitis Other         Maternal Grandmother - Ana Rosa Santamaria   • Colon polyps Other         Maternal Grandmother - Ana Rosa Santamaria     OB History        2    Para   2    Term   2            AB        Living   2       SAB        TAB        Ectopic        Molar        Multiple   0    Live Births   2              Prior to Admission medications    Medication Sig Start Date End Date Taking? Authorizing Provider   azithromycin (ZITHROMAX) 500 MG tablet TK 2 TS PO ONCE 1/3/20  Yes ProviderDivya MD   ibuprofen (ADVIL,MOTRIN) 600 MG tablet Take 1 tablet by mouth Every 8 (Eight) Hours As Needed for Mild Pain . 10/30/19  Yes Esteban Sands MD   metFORMIN (GLUCOPHAGE) 500 MG tablet Take 1 tablet by mouth Daily With Breakfast. 19 Yes Diandra Robins APRN   pantoprazole (PROTONIX) 40 MG EC tablet Take 1 tablet by mouth Daily. 19  Yes Lata Kenny APRN   predniSONE (DELTASONE) 20 MG tablet TK 1 T PO  BID FOR 5 DAYS 1/3/20  Yes ProviderDivya MD   sertraline (ZOLOFT) 50 MG tablet Take 1 tablet by mouth Daily. 10/30/19  Yes Esteban Sands  "MD JOHN   sucralfate (CARAFATE) 1 g tablet Take 1 tablet by mouth 4 (Four) Times a Day. 19  Yes Lata Kenny APRN     No Known Allergies  Social History     Socioeconomic History   • Marital status: Single     Spouse name: Not on file   • Number of children: 2   • Years of education: Not on file   • Highest education level: Not on file   Occupational History   • Occupation:       Employer: [employer]     Comment: Patient resides with children.   Tobacco Use   • Smoking status: Former Smoker     Packs/day: 1.00     Years: 13.00     Pack years: 13.00     Last attempt to quit: 2019     Years since quittin.2   • Smokeless tobacco: Never Used   Substance and Sexual Activity   • Alcohol use: No   • Drug use: No   • Sexual activity: Yes     Partners: Male     Birth control/protection: Tubal ligation       Review of Systems  Review of Systems   Constitutional: Positive for activity change, appetite change and fatigue. Negative for chills, diaphoresis, fever, unexpected weight change and weight loss.   HENT: Negative for sore throat and trouble swallowing.    Respiratory: Negative for cough and shortness of breath.    Gastrointestinal: Positive for abdominal pain, bloating, diarrhea, flatus, nausea and vomiting. Negative for abdominal distention, anal bleeding, blood in stool, constipation and rectal pain.   Musculoskeletal: Negative for arthralgias and myalgias.   Skin: Negative for pallor.   Neurological: Negative for light-headedness and headaches.        /68 (BP Location: Left arm)   Pulse 72   Ht 162.6 cm (64\")   Wt 112 kg (247 lb 9.6 oz)   BMI 42.50 kg/m²     Objective    Physical Exam   Constitutional: She is oriented to person, place, and time. She appears well-developed and well-nourished. She is cooperative. No distress.   HENT:   Head: Normocephalic and atraumatic.   Neck: Normal range of motion. Neck supple. No thyromegaly present.   Cardiovascular: Normal rate, regular rhythm and " normal heart sounds.   Pulmonary/Chest: Effort normal and breath sounds normal. She has no wheezes. She has no rhonchi. She has no rales.   Abdominal: Soft. Normal appearance and bowel sounds are normal. She exhibits no distension and no ascites. There is no hepatosplenomegaly. There is tenderness in the right upper quadrant, right lower quadrant, epigastric area, left upper quadrant and left lower quadrant. There is no rigidity and no guarding. No hernia.   Lymphadenopathy:     She has no cervical adenopathy.   Neurological: She is alert and oriented to person, place, and time.   Skin: Skin is warm, dry and intact. No rash noted. No pallor.   Psychiatric: She has a normal mood and affect. Her speech is normal.     Office Visit on 11/18/2019   Component Date Value Ref Range Status   • Glucose 11/18/2019 79  65 - 99 mg/dL Final   • BUN 11/18/2019 17  6 - 20 mg/dL Final   • Creatinine 11/18/2019 0.64  0.57 - 1.00 mg/dL Final   • Sodium 11/18/2019 136  136 - 145 mmol/L Final   • Potassium 11/18/2019 4.2  3.5 - 5.2 mmol/L Final   • Chloride 11/18/2019 101  98 - 107 mmol/L Final   • CO2 11/18/2019 23.5  22.0 - 29.0 mmol/L Final   • Calcium 11/18/2019 9.2  8.6 - 10.5 mg/dL Final   • Total Protein 11/18/2019 7.3  6.0 - 8.5 g/dL Final   • Albumin 11/18/2019 4.50  3.50 - 5.20 g/dL Final   • ALT (SGPT) 11/18/2019 19  1 - 33 U/L Final   • AST (SGOT) 11/18/2019 14  1 - 32 U/L Final   • Alkaline Phosphatase 11/18/2019 75  39 - 117 U/L Final   • Total Bilirubin 11/18/2019 0.2  0.2 - 1.2 mg/dL Final   • eGFR Non African Amer 11/18/2019 112  >60 mL/min/1.73 Final   • Globulin 11/18/2019 2.8  gm/dL Final   • A/G Ratio 11/18/2019 1.6  g/dL Final   • BUN/Creatinine Ratio 11/18/2019 26.6* 7.0 - 25.0 Final   • Anion Gap 11/18/2019 11.5  5.0 - 15.0 mmol/L Final     Assessment/Plan      1. Nausea    2. Change in bowel habits    3. Diarrhea, unspecified type    4. Generalized abdominal pain    .   Will reorder HIDA scan due to continual  right upper quadrant tenderness and nausea.  We will schedule patient for colonoscopy due to change in bowel habits diarrhea family history of Crohn disease.    Orders placed during this encounter include:  Orders Placed This Encounter   Procedures   • NM Hepatobiliary Without CCK     Standing Status:   Future     Standing Expiration Date:   1/6/2021     Order Specific Question:   Patient Pregnant     Answer:   No   • Follow Anesthesia Guidelines / Standing Orders     Standing Status:   Future   • Obtain Informed Consent     Standing Status:   Future     Order Specific Question:   Informed Consent Given For     Answer:   COLONOSCOPY       COLONOSCOPY (N/A)    Review and/or summary of lab tests, radiology, procedures, medications. Review and summary of old records and obtaining of history. The risks and benefits of my recommendations, as well as other treatment options were discussed with the patient today. Questions were answered.    New Medications Ordered This Visit   Medications   • polyethylene glycol (GoLYTELY) 236 g solution     Sig: Starting at noon on day prior to procedure, drink 8 ounces every 30 minutes until all gone or stools are clear. May add flavor packet.     Dispense:  4000 mL     Refill:  0       Follow-up: Return in about 4 weeks (around 2/3/2020) for After test.          This document has been electronically signed by PETTY Marshall on January 6, 2020 3:35 PM             Results for orders placed or performed in visit on 11/18/19   Comprehensive Metabolic Panel   Result Value Ref Range    Glucose 79 65 - 99 mg/dL    BUN 17 6 - 20 mg/dL    Creatinine 0.64 0.57 - 1.00 mg/dL    Sodium 136 136 - 145 mmol/L    Potassium 4.2 3.5 - 5.2 mmol/L    Chloride 101 98 - 107 mmol/L    CO2 23.5 22.0 - 29.0 mmol/L    Calcium 9.2 8.6 - 10.5 mg/dL    Total Protein 7.3 6.0 - 8.5 g/dL    Albumin 4.50 3.50 - 5.20 g/dL    ALT (SGPT) 19 1 - 33 U/L    AST (SGOT) 14 1 - 32 U/L    Alkaline Phosphatase 75 39 - 117 U/L  "   Total Bilirubin 0.2 0.2 - 1.2 mg/dL    eGFR Non African Amer 112 >60 mL/min/1.73    Globulin 2.8 gm/dL    A/G Ratio 1.6 g/dL    BUN/Creatinine Ratio 26.6 (H) 7.0 - 25.0    Anion Gap 11.5 5.0 - 15.0 mmol/L   Results for orders placed or performed during the hospital encounter of 11/11/19   Tissue Pathology Exam   Result Value Ref Range    Case Report       Surgical Pathology Report                         Case: [id number]                                  Authorizing Provider:  Sterling Neal MD        Collected:           11/11/2019 02:13 PM          Ordering Location:     [facility]             Received:            11/12/2019 06:59 AM                                 [facility]                                                     Pathologist:           Porfirio Quarles MD                                                           Specimens:   1) - Gastric, Antrum                                                                                2) - Esophagus, Distal                                                                     Final Diagnosis       1.  GASTRIC ANTRUM, BIOPSY:  REACTIVE GASTROPATHY.    2.  DISTAL ESOPHAGUS, BIOPSY:  CHRONIC ESOPHAGITIS.      Gross Description       1.  Specimen #1 is labeled \"ant\".  Three tan fragments measure 0.5 x 0.3 x 0.2 cm together.  Totally submitted.    2.  Specimen #2 is labeled \"DE\".  Two gray fragments measure 0.6 x 0.5 x 0.1 cm together.  Totally submitted.     POC Glucose Once   Result Value Ref Range    Glucose 87 70 - 130 mg/dL   Results for orders placed or performed in visit on 10/25/19   Pregnancy, Urine - Urine, Clean Catch   Result Value Ref Range    HCG, Urine QL Negative Negative   Results for orders placed or performed during the hospital encounter of 08/28/19   Gold Top - SST   Result Value Ref Range    Extra Tube Hold for add-ons.    Green Top (Gel)   Result Value Ref Range    Extra Tube Hold for add-ons.    CBC Auto Differential "   Result Value Ref Range    WBC 13.68 (H) 3.40 - 10.80 10*3/mm3    RBC 4.82 3.77 - 5.28 10*6/mm3    Hemoglobin 14.1 12.0 - 15.9 g/dL    Hematocrit 42.4 34.0 - 46.6 %    MCV 88.0 79.0 - 97.0 fL    MCH 29.3 26.6 - 33.0 pg    MCHC 33.3 31.5 - 35.7 g/dL    RDW 12.7 12.3 - 15.4 %    RDW-SD 41.1 37.0 - 54.0 fl    MPV 10.7 6.0 - 12.0 fL    Platelets 259 140 - 450 10*3/mm3    Neutrophil % 59.2 42.7 - 76.0 %    Lymphocyte % 32.9 19.6 - 45.3 %    Monocyte % 3.9 (L) 5.0 - 12.0 %    Eosinophil % 3.1 0.3 - 6.2 %    Basophil % 0.5 0.0 - 1.5 %    Immature Grans % 0.4 0.0 - 0.5 %    Neutrophils, Absolute 8.09 (H) 1.70 - 7.00 10*3/mm3    Lymphocytes, Absolute 4.50 (H) 0.70 - 3.10 10*3/mm3    Monocytes, Absolute 0.53 0.10 - 0.90 10*3/mm3    Eosinophils, Absolute 0.43 (H) 0.00 - 0.40 10*3/mm3    Basophils, Absolute 0.07 0.00 - 0.20 10*3/mm3    Immature Grans, Absolute 0.06 (H) 0.00 - 0.05 10*3/mm3    nRBC 0.0 0.0 - 0.2 /100 WBC   Lavender Top   Result Value Ref Range    Extra Tube hold for add-on    Light Blue Top   Result Value Ref Range    Extra Tube hold for add-on    Troponin   Result Value Ref Range    Troponin T <0.010 0.000 - 0.030 ng/mL   Comprehensive Metabolic Panel   Result Value Ref Range    Glucose 85 65 - 99 mg/dL    BUN 10 6 - 20 mg/dL    Creatinine 0.72 0.57 - 1.00 mg/dL    Sodium 139 136 - 145 mmol/L    Potassium 3.9 3.5 - 5.2 mmol/L    Chloride 102 98 - 107 mmol/L    CO2 24.0 22.0 - 29.0 mmol/L    Calcium 9.3 8.6 - 10.5 mg/dL    Total Protein 8.0 6.0 - 8.5 g/dL    Albumin 4.60 3.50 - 5.20 g/dL    ALT (SGPT) 19 1 - 33 U/L    AST (SGOT) 18 1 - 32 U/L    Alkaline Phosphatase 79 39 - 117 U/L    Total Bilirubin 0.4 0.2 - 1.2 mg/dL    eGFR Non African Amer 98 >60 mL/min/1.73    Globulin 3.4 gm/dL    A/G Ratio 1.4 g/dL    BUN/Creatinine Ratio 13.9 7.0 - 25.0    Anion Gap 13.0 5.0 - 15.0 mmol/L   Results for orders placed or performed in visit on 02/12/19   Testosterone, F Eqlib & T LC / MS   Result Value Ref Range     "Testosterone, Total 45.0 10.0 - 55.0 ng/dL    Testosterone, Free 0.59 0.10 - 0.85 ng/dL    Testosterone, Free % 1.32 0.50 - 2.80 %   Sex Horm Binding Globulin   Result Value Ref Range    Sex Hormone Binding Globulin 45.4 24.6 - 122.0 nmol/L   Progesterone   Result Value Ref Range    Progesterone 0.4 ng/mL   Insulin, Total   Result Value Ref Range    Insulin 57.1 (H) 2.6 - 24.9 uIU/mL   Estradiol   Result Value Ref Range    Estradiol 245.5 pg/mL   TSH   Result Value Ref Range    TSH 1.390 0.460 - 4.680 mIU/mL   Luteinizing Hormone   Result Value Ref Range    LH 65.50 mIU/mL   Follicle Stimulating Hormone   Result Value Ref Range    FSH 11.20 mIU/mL   Results for orders placed or performed during the hospital encounter of 08/07/18   PREVIOUS HISTORY   Result Value Ref Range    Previous History Previous Record on File    Tissue Pathology Exam   Result Value Ref Range    Case Report       Surgical Pathology Report                         Case: [id number]                                  Authorizing Provider:  Elizabeth Kenney MD        Collected:           08/07/2018 07:44 AM          Ordering Location:     [facility]             Received:            08/07/2018 08:36 AM                                 [place] [facility]                                                              Pathologist:           Pedro Arteaga MD                                                         Specimen:    Fallopian Tubes, Bilateral                                                                 Final Diagnosis       FALLOPIAN TUBES (2), BILATERAL:  UNREMARKABLE FALLOPIAN TUBES (2).      Gross Description       The container is labeled \"bilateral fallopian tubes\" and has 7 segments of uterine tubal tissue.  The largest specimen measures 3.0 x 0.7 x 0.7 cm.  It is attached to a paratubal serous cyst measuring 0.9 cm in greatest dimension.  The second fragment of tissue has delicate fimbria and measures 2.0 x 0.7 x 0.7 cm.  The third " fragment of tissue measures 4.0 x 0.8 x 0.7 cm.  Its fimbria are delicate.  The fourth fragment of tissue measures 2.0 x 0.8 x 0.7 cm.  The remaining 3 fragments measure 1.0 cc in aggregate.  Almost all of the tissue is embedded as 1A and 1B.     Antibody Identification   Result Value Ref Range    Anti-D ANTI-D    Type & Screen   Result Value Ref Range    ABO Type O     RH type Negative     Antibody Screen Positive     T&S Expiration Date 8/10/2018 11:59:59 PM    Results for orders placed or performed in visit on 08/03/18   PREVIOUS HISTORY   Result Value Ref Range    Previous History Previous Record on File    CBC Auto Differential   Result Value Ref Range    WBC 10.27 (H) 3.20 - 9.80 10*3/mm3    RBC 4.60 3.77 - 5.16 10*6/mm3    Hemoglobin 14.1 12.0 - 15.5 g/dL    Hematocrit 41.5 35.0 - 45.0 %    MCV 90.2 80.0 - 98.0 fL    MCH 30.7 26.5 - 34.0 pg    MCHC 34.0 31.4 - 36.0 g/dL    RDW 12.4 11.5 - 14.5 %    RDW-SD 40.3 36.4 - 46.3 fl    MPV 10.4 8.0 - 12.0 fL    Platelets 292 150 - 450 10*3/mm3    Neutrophil % 56.3 37.0 - 80.0 %    Lymphocyte % 36.7 10.0 - 50.0 %    Monocyte % 4.7 0.0 - 12.0 %    Eosinophil % 1.9 0.0 - 7.0 %    Basophil % 0.1 0.0 - 2.0 %    Immature Grans % 0.3 0.0 - 0.5 %    Neutrophils, Absolute 5.79 2.00 - 8.60 10*3/mm3    Lymphocytes, Absolute 3.77 0.60 - 4.20 10*3/mm3    Monocytes, Absolute 0.48 0.00 - 0.90 10*3/mm3    Eosinophils, Absolute 0.19 0.00 - 0.70 10*3/mm3    Basophils, Absolute 0.01 0.00 - 0.20 10*3/mm3    Immature Grans, Absolute 0.03 (H) 0.00 - 0.02 10*3/mm3     *Note: Due to a large number of results and/or encounters for the requested time period, some results have not been displayed. A complete set of results can be found in Results Review.

## 2020-01-06 NOTE — PATIENT INSTRUCTIONS

## 2020-01-06 NOTE — H&P (VIEW-ONLY)
Chief Complaint   Patient presents with   • Heartburn   • Abdominal Pain       Subjective    Marleny Conn is a 24 y.o. female. she is here today for follow-up.  24-year-old female presents for follow-up regarding abdominal pain and now would like to discuss change in bowel habits to frequent diarrhea.  She has family history of Crohn's in her grandmother.     Diarrhea    This is a new problem. The current episode started more than 1 month ago. The problem occurs 5 to 10 times per day. The problem has been waxing and waning. The stool consistency is described as watery. The patient states that diarrhea awakens her from sleep. Associated symptoms include abdominal pain, bloating, increased flatus, sweats and vomiting. Pertinent negatives include no arthralgias, chills, coughing, fever, headaches, myalgias, URI or weight loss. Exacerbated by: spicy greasy foods, corn. The treatment provided mild relief.     At last visit she was still having right upper quadrant tenderness and we ordered HIDA scan which was not approved by her insurance so she did not have that exam completed.  MRI of the liver due to small lesion noted stable 1 cm lesion likely benign hemangioma but recommend repeat in 1 year to document stability.       The following portions of the patient's history were reviewed and updated as appropriate:   Past Medical History:   Diagnosis Date   • Acid reflux    • Allergic rhinitis    • Anxiety    • Anxiety    • Bronchitis     probable   • Common cold    • Conjunctivitis    • Dysfunction of eustachian tube    • Examination of eyes and vision     general; NORMAL   • Gastroenteritis    • Headache    • Hemorrhoids     likely   • Infection of skin and subcutaneous tissue    • Migraine    • Nonvenomous insect bite    • Open wound of lower limb    • Pharyngitis    • Rhinitis    • Tick bite    • URI (upper respiratory infection)      Past Surgical History:   Procedure Laterality Date   • ENDOSCOPY N/A 11/11/2019     Procedure: ESOPHAGOGASTRODUODENOSCOPY;  Surgeon: Sterling Neal MD;  Location: [facility] ENDOSCOPY;  Service: Gastroenterology   • SALPINGECTOMY Bilateral 2018    Procedure: SALPINGECTOMY LAPAROSCOPIC;  Surgeon: Elizabeth Kenney MD;  Location: [facility] OR;  Service: Obstetrics/Gynecology   • WISDOM TOOTH EXTRACTION       Family History   Problem Relation Age of Onset   • Hypertension Brother    • Hypertension Paternal Grandmother    • Hypertension Maternal Grandmother    • Diabetes Maternal Grandmother    • No Known Problems Father    • No Known Problems Mother    • Heart attack Paternal Grandfather    • Hypertension Maternal Grandfather    • Sleep apnea Maternal Grandfather    • Heart disease Other    • Stroke Other    • Hypertension Other    • Diabetes Other    • Crohn's disease Other         Maternal Grandmother - Ana Rosa Santamaria   • Diverticulitis Other         Maternal Grandmother - Ana Rosa Santamaria   • Colon polyps Other         Maternal Grandmother - Ana Rosa Santamaria     OB History        2    Para   2    Term   2            AB        Living   2       SAB        TAB        Ectopic        Molar        Multiple   0    Live Births   2              Prior to Admission medications    Medication Sig Start Date End Date Taking? Authorizing Provider   azithromycin (ZITHROMAX) 500 MG tablet TK 2 TS PO ONCE 1/3/20  Yes ProviderDivya MD   ibuprofen (ADVIL,MOTRIN) 600 MG tablet Take 1 tablet by mouth Every 8 (Eight) Hours As Needed for Mild Pain . 10/30/19  Yes Esteban Sands MD   metFORMIN (GLUCOPHAGE) 500 MG tablet Take 1 tablet by mouth Daily With Breakfast. 19 Yes Diandra Robins APRN   pantoprazole (PROTONIX) 40 MG EC tablet Take 1 tablet by mouth Daily. 19  Yes Lata Kenny APRN   predniSONE (DELTASONE) 20 MG tablet TK 1 T PO  BID FOR 5 DAYS 1/3/20  Yes ProviderDivya MD   sertraline (ZOLOFT) 50 MG tablet Take 1 tablet by mouth Daily. 10/30/19  Yes Esteban Sands  "MD JOHN   sucralfate (CARAFATE) 1 g tablet Take 1 tablet by mouth 4 (Four) Times a Day. 19  Yes Lata Kenny APRN     No Known Allergies  Social History     Socioeconomic History   • Marital status: Single     Spouse name: Not on file   • Number of children: 2   • Years of education: Not on file   • Highest education level: Not on file   Occupational History   • Occupation:       Employer: [employer]     Comment: Patient resides with children.   Tobacco Use   • Smoking status: Former Smoker     Packs/day: 1.00     Years: 13.00     Pack years: 13.00     Last attempt to quit: 2019     Years since quittin.2   • Smokeless tobacco: Never Used   Substance and Sexual Activity   • Alcohol use: No   • Drug use: No   • Sexual activity: Yes     Partners: Male     Birth control/protection: Tubal ligation       Review of Systems  Review of Systems   Constitutional: Positive for activity change, appetite change and fatigue. Negative for chills, diaphoresis, fever, unexpected weight change and weight loss.   HENT: Negative for sore throat and trouble swallowing.    Respiratory: Negative for cough and shortness of breath.    Gastrointestinal: Positive for abdominal pain, bloating, diarrhea, flatus, nausea and vomiting. Negative for abdominal distention, anal bleeding, blood in stool, constipation and rectal pain.   Musculoskeletal: Negative for arthralgias and myalgias.   Skin: Negative for pallor.   Neurological: Negative for light-headedness and headaches.        /68 (BP Location: Left arm)   Pulse 72   Ht 162.6 cm (64\")   Wt 112 kg (247 lb 9.6 oz)   BMI 42.50 kg/m²     Objective    Physical Exam   Constitutional: She is oriented to person, place, and time. She appears well-developed and well-nourished. She is cooperative. No distress.   HENT:   Head: Normocephalic and atraumatic.   Neck: Normal range of motion. Neck supple. No thyromegaly present.   Cardiovascular: Normal rate, regular rhythm and " normal heart sounds.   Pulmonary/Chest: Effort normal and breath sounds normal. She has no wheezes. She has no rhonchi. She has no rales.   Abdominal: Soft. Normal appearance and bowel sounds are normal. She exhibits no distension and no ascites. There is no hepatosplenomegaly. There is tenderness in the right upper quadrant, right lower quadrant, epigastric area, left upper quadrant and left lower quadrant. There is no rigidity and no guarding. No hernia.   Lymphadenopathy:     She has no cervical adenopathy.   Neurological: She is alert and oriented to person, place, and time.   Skin: Skin is warm, dry and intact. No rash noted. No pallor.   Psychiatric: She has a normal mood and affect. Her speech is normal.     Office Visit on 11/18/2019   Component Date Value Ref Range Status   • Glucose 11/18/2019 79  65 - 99 mg/dL Final   • BUN 11/18/2019 17  6 - 20 mg/dL Final   • Creatinine 11/18/2019 0.64  0.57 - 1.00 mg/dL Final   • Sodium 11/18/2019 136  136 - 145 mmol/L Final   • Potassium 11/18/2019 4.2  3.5 - 5.2 mmol/L Final   • Chloride 11/18/2019 101  98 - 107 mmol/L Final   • CO2 11/18/2019 23.5  22.0 - 29.0 mmol/L Final   • Calcium 11/18/2019 9.2  8.6 - 10.5 mg/dL Final   • Total Protein 11/18/2019 7.3  6.0 - 8.5 g/dL Final   • Albumin 11/18/2019 4.50  3.50 - 5.20 g/dL Final   • ALT (SGPT) 11/18/2019 19  1 - 33 U/L Final   • AST (SGOT) 11/18/2019 14  1 - 32 U/L Final   • Alkaline Phosphatase 11/18/2019 75  39 - 117 U/L Final   • Total Bilirubin 11/18/2019 0.2  0.2 - 1.2 mg/dL Final   • eGFR Non African Amer 11/18/2019 112  >60 mL/min/1.73 Final   • Globulin 11/18/2019 2.8  gm/dL Final   • A/G Ratio 11/18/2019 1.6  g/dL Final   • BUN/Creatinine Ratio 11/18/2019 26.6* 7.0 - 25.0 Final   • Anion Gap 11/18/2019 11.5  5.0 - 15.0 mmol/L Final     Assessment/Plan      1. Nausea    2. Change in bowel habits    3. Diarrhea, unspecified type    4. Generalized abdominal pain    .   Will reorder HIDA scan due to continual  right upper quadrant tenderness and nausea.  We will schedule patient for colonoscopy due to change in bowel habits diarrhea family history of Crohn disease.    Orders placed during this encounter include:  Orders Placed This Encounter   Procedures   • NM Hepatobiliary Without CCK     Standing Status:   Future     Standing Expiration Date:   1/6/2021     Order Specific Question:   Patient Pregnant     Answer:   No   • Follow Anesthesia Guidelines / Standing Orders     Standing Status:   Future   • Obtain Informed Consent     Standing Status:   Future     Order Specific Question:   Informed Consent Given For     Answer:   COLONOSCOPY       COLONOSCOPY (N/A)    Review and/or summary of lab tests, radiology, procedures, medications. Review and summary of old records and obtaining of history. The risks and benefits of my recommendations, as well as other treatment options were discussed with the patient today. Questions were answered.    New Medications Ordered This Visit   Medications   • polyethylene glycol (GoLYTELY) 236 g solution     Sig: Starting at noon on day prior to procedure, drink 8 ounces every 30 minutes until all gone or stools are clear. May add flavor packet.     Dispense:  4000 mL     Refill:  0       Follow-up: Return in about 4 weeks (around 2/3/2020) for After test.          This document has been electronically signed by PETTY Marshall on January 6, 2020 3:35 PM             Results for orders placed or performed in visit on 11/18/19   Comprehensive Metabolic Panel   Result Value Ref Range    Glucose 79 65 - 99 mg/dL    BUN 17 6 - 20 mg/dL    Creatinine 0.64 0.57 - 1.00 mg/dL    Sodium 136 136 - 145 mmol/L    Potassium 4.2 3.5 - 5.2 mmol/L    Chloride 101 98 - 107 mmol/L    CO2 23.5 22.0 - 29.0 mmol/L    Calcium 9.2 8.6 - 10.5 mg/dL    Total Protein 7.3 6.0 - 8.5 g/dL    Albumin 4.50 3.50 - 5.20 g/dL    ALT (SGPT) 19 1 - 33 U/L    AST (SGOT) 14 1 - 32 U/L    Alkaline Phosphatase 75 39 - 117 U/L   "   Total Bilirubin 0.2 0.2 - 1.2 mg/dL    eGFR Non African Amer 112 >60 mL/min/1.73    Globulin 2.8 gm/dL    A/G Ratio 1.6 g/dL    BUN/Creatinine Ratio 26.6 (H) 7.0 - 25.0    Anion Gap 11.5 5.0 - 15.0 mmol/L   Results for orders placed or performed during the hospital encounter of 11/11/19   Tissue Pathology Exam   Result Value Ref Range    Case Report       Surgical Pathology Report                         Case: [id number]                                  Authorizing Provider:  Sterilng Neal MD        Collected:           11/11/2019 02:13 PM          Ordering Location:     [facility]             Received:            11/12/2019 06:59 AM                                 [facility]                                                     Pathologist:           Porfirio Quarles MD                                                           Specimens:   1) - Gastric, Antrum                                                                                2) - Esophagus, Distal                                                                     Final Diagnosis       1.  GASTRIC ANTRUM, BIOPSY:  REACTIVE GASTROPATHY.    2.  DISTAL ESOPHAGUS, BIOPSY:  CHRONIC ESOPHAGITIS.      Gross Description       1.  Specimen #1 is labeled \"ant\".  Three tan fragments measure 0.5 x 0.3 x 0.2 cm together.  Totally submitted.    2.  Specimen #2 is labeled \"DE\".  Two gray fragments measure 0.6 x 0.5 x 0.1 cm together.  Totally submitted.     POC Glucose Once   Result Value Ref Range    Glucose 87 70 - 130 mg/dL   Results for orders placed or performed in visit on 10/25/19   Pregnancy, Urine - Urine, Clean Catch   Result Value Ref Range    HCG, Urine QL Negative Negative   Results for orders placed or performed during the hospital encounter of 08/28/19   Gold Top - SST   Result Value Ref Range    Extra Tube Hold for add-ons.    Green Top (Gel)   Result Value Ref Range    Extra Tube Hold for add-ons.    CBC Auto Differential "   Result Value Ref Range    WBC 13.68 (H) 3.40 - 10.80 10*3/mm3    RBC 4.82 3.77 - 5.28 10*6/mm3    Hemoglobin 14.1 12.0 - 15.9 g/dL    Hematocrit 42.4 34.0 - 46.6 %    MCV 88.0 79.0 - 97.0 fL    MCH 29.3 26.6 - 33.0 pg    MCHC 33.3 31.5 - 35.7 g/dL    RDW 12.7 12.3 - 15.4 %    RDW-SD 41.1 37.0 - 54.0 fl    MPV 10.7 6.0 - 12.0 fL    Platelets 259 140 - 450 10*3/mm3    Neutrophil % 59.2 42.7 - 76.0 %    Lymphocyte % 32.9 19.6 - 45.3 %    Monocyte % 3.9 (L) 5.0 - 12.0 %    Eosinophil % 3.1 0.3 - 6.2 %    Basophil % 0.5 0.0 - 1.5 %    Immature Grans % 0.4 0.0 - 0.5 %    Neutrophils, Absolute 8.09 (H) 1.70 - 7.00 10*3/mm3    Lymphocytes, Absolute 4.50 (H) 0.70 - 3.10 10*3/mm3    Monocytes, Absolute 0.53 0.10 - 0.90 10*3/mm3    Eosinophils, Absolute 0.43 (H) 0.00 - 0.40 10*3/mm3    Basophils, Absolute 0.07 0.00 - 0.20 10*3/mm3    Immature Grans, Absolute 0.06 (H) 0.00 - 0.05 10*3/mm3    nRBC 0.0 0.0 - 0.2 /100 WBC   Lavender Top   Result Value Ref Range    Extra Tube hold for add-on    Light Blue Top   Result Value Ref Range    Extra Tube hold for add-on    Troponin   Result Value Ref Range    Troponin T <0.010 0.000 - 0.030 ng/mL   Comprehensive Metabolic Panel   Result Value Ref Range    Glucose 85 65 - 99 mg/dL    BUN 10 6 - 20 mg/dL    Creatinine 0.72 0.57 - 1.00 mg/dL    Sodium 139 136 - 145 mmol/L    Potassium 3.9 3.5 - 5.2 mmol/L    Chloride 102 98 - 107 mmol/L    CO2 24.0 22.0 - 29.0 mmol/L    Calcium 9.3 8.6 - 10.5 mg/dL    Total Protein 8.0 6.0 - 8.5 g/dL    Albumin 4.60 3.50 - 5.20 g/dL    ALT (SGPT) 19 1 - 33 U/L    AST (SGOT) 18 1 - 32 U/L    Alkaline Phosphatase 79 39 - 117 U/L    Total Bilirubin 0.4 0.2 - 1.2 mg/dL    eGFR Non African Amer 98 >60 mL/min/1.73    Globulin 3.4 gm/dL    A/G Ratio 1.4 g/dL    BUN/Creatinine Ratio 13.9 7.0 - 25.0    Anion Gap 13.0 5.0 - 15.0 mmol/L   Results for orders placed or performed in visit on 02/12/19   Testosterone, F Eqlib & T LC / MS   Result Value Ref Range     "Testosterone, Total 45.0 10.0 - 55.0 ng/dL    Testosterone, Free 0.59 0.10 - 0.85 ng/dL    Testosterone, Free % 1.32 0.50 - 2.80 %   Sex Horm Binding Globulin   Result Value Ref Range    Sex Hormone Binding Globulin 45.4 24.6 - 122.0 nmol/L   Progesterone   Result Value Ref Range    Progesterone 0.4 ng/mL   Insulin, Total   Result Value Ref Range    Insulin 57.1 (H) 2.6 - 24.9 uIU/mL   Estradiol   Result Value Ref Range    Estradiol 245.5 pg/mL   TSH   Result Value Ref Range    TSH 1.390 0.460 - 4.680 mIU/mL   Luteinizing Hormone   Result Value Ref Range    LH 65.50 mIU/mL   Follicle Stimulating Hormone   Result Value Ref Range    FSH 11.20 mIU/mL   Results for orders placed or performed during the hospital encounter of 08/07/18   PREVIOUS HISTORY   Result Value Ref Range    Previous History Previous Record on File    Tissue Pathology Exam   Result Value Ref Range    Case Report       Surgical Pathology Report                         Case: [id number]                                  Authorizing Provider:  Elizabeth Kenney MD        Collected:           08/07/2018 07:44 AM          Ordering Location:     [facility]             Received:            08/07/2018 08:36 AM                                 [place] OR                                                              Pathologist:           Pedro Arteaga MD                                                         Specimen:    Fallopian Tubes, Bilateral                                                                 Final Diagnosis       FALLOPIAN TUBES (2), BILATERAL:  UNREMARKABLE FALLOPIAN TUBES (2).      Gross Description       The container is labeled \"bilateral fallopian tubes\" and has 7 segments of uterine tubal tissue.  The largest specimen measures 3.0 x 0.7 x 0.7 cm.  It is attached to a paratubal serous cyst measuring 0.9 cm in greatest dimension.  The second fragment of tissue has delicate fimbria and measures 2.0 x 0.7 x 0.7 cm.  The third " fragment of tissue measures 4.0 x 0.8 x 0.7 cm.  Its fimbria are delicate.  The fourth fragment of tissue measures 2.0 x 0.8 x 0.7 cm.  The remaining 3 fragments measure 1.0 cc in aggregate.  Almost all of the tissue is embedded as 1A and 1B.     Antibody Identification   Result Value Ref Range    Anti-D ANTI-D    Type & Screen   Result Value Ref Range    ABO Type O     RH type Negative     Antibody Screen Positive     T&S Expiration Date 8/10/2018 11:59:59 PM    Results for orders placed or performed in visit on 08/03/18   PREVIOUS HISTORY   Result Value Ref Range    Previous History Previous Record on File    CBC Auto Differential   Result Value Ref Range    WBC 10.27 (H) 3.20 - 9.80 10*3/mm3    RBC 4.60 3.77 - 5.16 10*6/mm3    Hemoglobin 14.1 12.0 - 15.5 g/dL    Hematocrit 41.5 35.0 - 45.0 %    MCV 90.2 80.0 - 98.0 fL    MCH 30.7 26.5 - 34.0 pg    MCHC 34.0 31.4 - 36.0 g/dL    RDW 12.4 11.5 - 14.5 %    RDW-SD 40.3 36.4 - 46.3 fl    MPV 10.4 8.0 - 12.0 fL    Platelets 292 150 - 450 10*3/mm3    Neutrophil % 56.3 37.0 - 80.0 %    Lymphocyte % 36.7 10.0 - 50.0 %    Monocyte % 4.7 0.0 - 12.0 %    Eosinophil % 1.9 0.0 - 7.0 %    Basophil % 0.1 0.0 - 2.0 %    Immature Grans % 0.3 0.0 - 0.5 %    Neutrophils, Absolute 5.79 2.00 - 8.60 10*3/mm3    Lymphocytes, Absolute 3.77 0.60 - 4.20 10*3/mm3    Monocytes, Absolute 0.48 0.00 - 0.90 10*3/mm3    Eosinophils, Absolute 0.19 0.00 - 0.70 10*3/mm3    Basophils, Absolute 0.01 0.00 - 0.20 10*3/mm3    Immature Grans, Absolute 0.03 (H) 0.00 - 0.02 10*3/mm3     *Note: Due to a large number of results and/or encounters for the requested time period, some results have not been displayed. A complete set of results can be found in Results Review.

## 2020-01-09 ENCOUNTER — TELEPHONE (OUTPATIENT)
Dept: GASTROENTEROLOGY | Facility: CLINIC | Age: 25
End: 2020-01-09

## 2020-01-09 NOTE — TELEPHONE ENCOUNTER
precert called to inform office of denial of hida scan. Test cannot be resubmitted for approval for 45 days after the first order. Contacted patient and informed her, she will have endoscopy done, we will cancel hida and will reschedule at the 45 day halima. Patient voiced understanding.

## 2020-01-13 ENCOUNTER — APPOINTMENT (OUTPATIENT)
Dept: NUCLEAR MEDICINE | Facility: HOSPITAL | Age: 25
End: 2020-01-13

## 2020-01-15 ENCOUNTER — HOSPITAL ENCOUNTER (OUTPATIENT)
Facility: HOSPITAL | Age: 25
Setting detail: HOSPITAL OUTPATIENT SURGERY
Discharge: HOME | End: 2020-01-15
Attending: INTERNAL MEDICINE | Admitting: INTERNAL MEDICINE

## 2020-01-15 ENCOUNTER — ANESTHESIA EVENT (OUTPATIENT)
Dept: GASTROENTEROLOGY | Facility: HOSPITAL | Age: 25
End: 2020-01-15

## 2020-01-15 ENCOUNTER — ANESTHESIA (OUTPATIENT)
Dept: GASTROENTEROLOGY | Facility: HOSPITAL | Age: 25
End: 2020-01-15

## 2020-01-15 VITALS
HEIGHT: 64 IN | HEART RATE: 97 BPM | SYSTOLIC BLOOD PRESSURE: 103 MMHG | RESPIRATION RATE: 18 BRPM | BODY MASS INDEX: 41.22 KG/M2 | DIASTOLIC BLOOD PRESSURE: 59 MMHG | OXYGEN SATURATION: 100 % | TEMPERATURE: 96.8 F | WEIGHT: 241.44 LBS

## 2020-01-15 DIAGNOSIS — R19.7 DIARRHEA, UNSPECIFIED TYPE: ICD-10-CM

## 2020-01-15 DIAGNOSIS — R10.84 GENERALIZED ABDOMINAL PAIN: ICD-10-CM

## 2020-01-15 DIAGNOSIS — R11.0 NAUSEA: ICD-10-CM

## 2020-01-15 DIAGNOSIS — R19.4 CHANGE IN BOWEL HABITS: ICD-10-CM

## 2020-01-15 LAB — GLUCOSE BLDC GLUCOMTR-MCNC: 77 MG/DL (ref 70–130)

## 2020-01-15 PROCEDURE — 88305 TISSUE EXAM BY PATHOLOGIST: CPT | Performed by: INTERNAL MEDICINE

## 2020-01-15 PROCEDURE — 82962 GLUCOSE BLOOD TEST: CPT

## 2020-01-15 PROCEDURE — 25010000002 PROPOFOL 10 MG/ML EMULSION: Performed by: NURSE ANESTHETIST, CERTIFIED REGISTERED

## 2020-01-15 PROCEDURE — 25010000002 MIDAZOLAM PER 1 MG: Performed by: NURSE ANESTHETIST, CERTIFIED REGISTERED

## 2020-01-15 PROCEDURE — 88305 TISSUE EXAM BY PATHOLOGIST: CPT | Performed by: PATHOLOGY

## 2020-01-15 PROCEDURE — 45380 COLONOSCOPY AND BIOPSY: CPT | Performed by: INTERNAL MEDICINE

## 2020-01-15 RX ORDER — LIDOCAINE HYDROCHLORIDE 20 MG/ML
INJECTION, SOLUTION INTRAVENOUS AS NEEDED
Status: DISCONTINUED | OUTPATIENT
Start: 2020-01-15 | End: 2020-01-15 | Stop reason: SURG

## 2020-01-15 RX ORDER — ONDANSETRON 2 MG/ML
4 INJECTION INTRAMUSCULAR; INTRAVENOUS ONCE AS NEEDED
Status: DISCONTINUED | OUTPATIENT
Start: 2020-01-15 | End: 2020-01-15 | Stop reason: SDUPTHER

## 2020-01-15 RX ORDER — DEXTROSE AND SODIUM CHLORIDE 5; .45 G/100ML; G/100ML
30 INJECTION, SOLUTION INTRAVENOUS CONTINUOUS PRN
Status: DISCONTINUED | OUTPATIENT
Start: 2020-01-15 | End: 2020-01-15 | Stop reason: HOSPADM

## 2020-01-15 RX ORDER — PROMETHAZINE HYDROCHLORIDE 25 MG/ML
12.5 INJECTION, SOLUTION INTRAMUSCULAR; INTRAVENOUS ONCE AS NEEDED
Status: DISCONTINUED | OUTPATIENT
Start: 2020-01-15 | End: 2020-01-15 | Stop reason: SDUPTHER

## 2020-01-15 RX ORDER — ONDANSETRON 2 MG/ML
4 INJECTION INTRAMUSCULAR; INTRAVENOUS ONCE AS NEEDED
Status: DISCONTINUED | OUTPATIENT
Start: 2020-01-15 | End: 2020-01-15 | Stop reason: HOSPADM

## 2020-01-15 RX ORDER — PROPOFOL 10 MG/ML
VIAL (ML) INTRAVENOUS AS NEEDED
Status: DISCONTINUED | OUTPATIENT
Start: 2020-01-15 | End: 2020-01-15 | Stop reason: SURG

## 2020-01-15 RX ORDER — PROMETHAZINE HYDROCHLORIDE 25 MG/ML
12.5 INJECTION, SOLUTION INTRAMUSCULAR; INTRAVENOUS ONCE AS NEEDED
Status: DISCONTINUED | OUTPATIENT
Start: 2020-01-15 | End: 2020-01-15 | Stop reason: HOSPADM

## 2020-01-15 RX ORDER — PROMETHAZINE HYDROCHLORIDE 25 MG/1
25 SUPPOSITORY RECTAL ONCE AS NEEDED
Status: DISCONTINUED | OUTPATIENT
Start: 2020-01-15 | End: 2020-01-15 | Stop reason: HOSPADM

## 2020-01-15 RX ORDER — SODIUM CHLORIDE 0.9 % (FLUSH) 0.9 %
10 SYRINGE (ML) INJECTION AS NEEDED
Status: DISCONTINUED | OUTPATIENT
Start: 2020-01-15 | End: 2020-01-15 | Stop reason: HOSPADM

## 2020-01-15 RX ORDER — SODIUM CHLORIDE 0.9 % (FLUSH) 0.9 %
3 SYRINGE (ML) INJECTION EVERY 12 HOURS SCHEDULED
Status: DISCONTINUED | OUTPATIENT
Start: 2020-01-15 | End: 2020-01-15 | Stop reason: HOSPADM

## 2020-01-15 RX ORDER — PROMETHAZINE HYDROCHLORIDE 25 MG/1
25 TABLET ORAL ONCE AS NEEDED
Status: DISCONTINUED | OUTPATIENT
Start: 2020-01-15 | End: 2020-01-15 | Stop reason: SDUPTHER

## 2020-01-15 RX ORDER — MIDAZOLAM HYDROCHLORIDE 1 MG/ML
INJECTION INTRAMUSCULAR; INTRAVENOUS AS NEEDED
Status: DISCONTINUED | OUTPATIENT
Start: 2020-01-15 | End: 2020-01-15 | Stop reason: SURG

## 2020-01-15 RX ORDER — PROMETHAZINE HYDROCHLORIDE 25 MG/1
25 TABLET ORAL ONCE AS NEEDED
Status: DISCONTINUED | OUTPATIENT
Start: 2020-01-15 | End: 2020-01-15 | Stop reason: HOSPADM

## 2020-01-15 RX ORDER — PROMETHAZINE HYDROCHLORIDE 25 MG/1
25 SUPPOSITORY RECTAL ONCE AS NEEDED
Status: DISCONTINUED | OUTPATIENT
Start: 2020-01-15 | End: 2020-01-15 | Stop reason: SDUPTHER

## 2020-01-15 RX ADMIN — PROPOFOL 100 MG: 10 INJECTION, EMULSION INTRAVENOUS at 14:12

## 2020-01-15 RX ADMIN — PROPOFOL 100 MG: 10 INJECTION, EMULSION INTRAVENOUS at 14:09

## 2020-01-15 RX ADMIN — MIDAZOLAM HYDROCHLORIDE 2 MG: 2 INJECTION, SOLUTION INTRAMUSCULAR; INTRAVENOUS at 14:09

## 2020-01-15 RX ADMIN — PROPOFOL 100 MG: 10 INJECTION, EMULSION INTRAVENOUS at 14:17

## 2020-01-15 RX ADMIN — DEXTROSE AND SODIUM CHLORIDE 30 ML/HR: 5; 450 INJECTION, SOLUTION INTRAVENOUS at 13:51

## 2020-01-15 RX ADMIN — LIDOCAINE HYDROCHLORIDE 50 MG: 20 INJECTION, SOLUTION INTRAVENOUS at 14:09

## 2020-01-15 NOTE — ANESTHESIA POSTPROCEDURE EVALUATION
Patient: Marleny Conn    Procedure Summary     Date:  01/15/20 Room / Location:  [facility] ENDOSCOPY 1 / [facility] ENDOSCOPY    Anesthesia Start:  1408 Anesthesia Stop:  1422    Procedure:  COLONOSCOPY (N/A ) Diagnosis:       Nausea      Change in bowel habits      Diarrhea, unspecified type      Generalized abdominal pain      (Nausea [R11.0])      (Change in bowel habits [R19.4])      (Diarrhea, unspecified type [R19.7])      (Generalized abdominal pain [R10.84])    Surgeon:  Sterling Neal MD Provider:  Marco Antonio Rothman CRNA    Anesthesia Type:  MAC ASA Status:  3          Anesthesia Type: MAC    Vitals  No vitals data found for the desired time range.          Post Anesthesia Care and Evaluation    Patient location during evaluation: bedside  Patient participation: complete - patient participated  Level of consciousness: awake and alert  Pain score: 1  Pain management: adequate  Airway patency: patent  Anesthetic complications: No anesthetic complications  PONV Status: none  Cardiovascular status: acceptable  Respiratory status: acceptable  Hydration status: acceptable  Post Neuraxial Block status: Motor and sensory function returned to baseline

## 2020-01-15 NOTE — ANESTHESIA PREPROCEDURE EVALUATION
Anesthesia Evaluation     Patient summary reviewed and Nursing notes reviewed   NPO Solid Status: > 8 hours  NPO Liquid Status: > 8 hours           Airway   No difficulty expected  Dental      Pulmonary - normal exam   (+) recent URI,   Cardiovascular - normal exam        Neuro/Psych  (+) headaches, psychiatric history,     GI/Hepatic/Renal/Endo      Musculoskeletal     Abdominal   (+) obese,    Substance History      OB/GYN          Other                      Anesthesia Plan    ASA 3     MAC     intravenous induction     Anesthetic plan, all risks, benefits, and alternatives have been provided, discussed and informed consent has been obtained with: patient.    Plan discussed with CRNA.

## 2020-01-17 LAB
LAB AP CASE REPORT: NORMAL
PATH REPORT.FINAL DX SPEC: NORMAL
PATH REPORT.GROSS SPEC: NORMAL

## 2020-02-12 ENCOUNTER — OFFICE VISIT (OUTPATIENT)
Dept: GASTROENTEROLOGY | Facility: CLINIC | Age: 25
End: 2020-02-12

## 2020-02-12 VITALS
WEIGHT: 241 LBS | HEIGHT: 64 IN | BODY MASS INDEX: 41.15 KG/M2 | DIASTOLIC BLOOD PRESSURE: 64 MMHG | SYSTOLIC BLOOD PRESSURE: 122 MMHG | HEART RATE: 96 BPM

## 2020-02-12 DIAGNOSIS — K21.00 GASTROESOPHAGEAL REFLUX DISEASE WITH ESOPHAGITIS: Primary | ICD-10-CM

## 2020-02-12 DIAGNOSIS — R11.0 NAUSEA: ICD-10-CM

## 2020-02-12 DIAGNOSIS — K58.1 IRRITABLE BOWEL SYNDROME WITH CONSTIPATION: ICD-10-CM

## 2020-02-12 PROCEDURE — 99214 OFFICE O/P EST MOD 30 MIN: CPT | Performed by: NURSE PRACTITIONER

## 2020-02-12 RX ORDER — DICYCLOMINE HCL 20 MG
20 TABLET ORAL
Qty: 90 TABLET | Refills: 5 | Status: SHIPPED | OUTPATIENT
Start: 2020-02-12 | End: 2020-03-13

## 2020-02-12 NOTE — PATIENT INSTRUCTIONS

## 2020-02-12 NOTE — PROGRESS NOTES
Chief Complaint   Patient presents with   • Diarrhea   • Abdominal Pain       Subjective    Marleny Conn is a 24 y.o. female. she is here today for follow-up.  24-year-old female presents to discuss abdominal pain diarrhea and colonoscopy results.  Reports she still has frequent abdominal pain diarrhea and occasionally bouts of constipation.  Denies any melena or hematochezia.  States diarrhea occurs episodically.  Reports recently it has been much better, less frequent episodes.  Reports constipation has been more of an issue recently.  Patient had EGD previously along with ultrasound due to upper abdominal pain.  HIDA scan was ordered but not approved by her insurance so she did not have that completed.  MRI follow-up of liver lesion noted stable 1 cm lesion likely benign hemangioma with recommendation to repeat in 1 year to document stability.    Diarrhea    Associated symptoms include abdominal pain. Pertinent negatives include no arthralgias, chills, fever or vomiting.   Abdominal Pain   Associated symptoms include constipation. Pertinent negatives include no arthralgias, diarrhea, fever, nausea or vomiting.   Colonoscopy 1/15/20 noted normal perianal digital rectal exam.  Hemorrhoids were noted otherwise normal exam.  Terminal ileum and random colonic biopsy noted no significant histologic abnormality.         The following portions of the patient's history were reviewed and updated as appropriate:   Past Medical History:   Diagnosis Date   • Acid reflux    • Allergic rhinitis    • Anxiety    • Anxiety    • Bronchitis     probable   • Common cold    • Conjunctivitis    • Dysfunction of eustachian tube    • Examination of eyes and vision     general; NORMAL   • Gastroenteritis    • Headache    • Hemorrhoids     likely   • Infection of skin and subcutaneous tissue    • Migraine    • Nonvenomous insect bite    • Open wound of lower limb    • Pharyngitis    • Rhinitis    • Tick bite    • URI (upper  respiratory infection)      Past Surgical History:   Procedure Laterality Date   • COLONOSCOPY N/A 1/15/2020    Procedure: COLONOSCOPY;  Surgeon: Sterling Neal MD;  Location: [facility] ENDOSCOPY;  Service: Gastroenterology   • ENDOSCOPY N/A 2019    Procedure: ESOPHAGOGASTRODUODENOSCOPY;  Surgeon: Sterling Neal MD;  Location: [facility] ENDOSCOPY;  Service: Gastroenterology   • SALPINGECTOMY Bilateral 2018    Procedure: SALPINGECTOMY LAPAROSCOPIC;  Surgeon: Elizabeth Kenney MD;  Location: [facility] OR;  Service: Obstetrics/Gynecology   • WISDOM TOOTH EXTRACTION       Family History   Problem Relation Age of Onset   • Hypertension Brother    • Hypertension Paternal Grandmother    • Hypertension Maternal Grandmother    • Diabetes Maternal Grandmother    • No Known Problems Father    • No Known Problems Mother    • Heart attack Paternal Grandfather    • Hypertension Maternal Grandfather    • Sleep apnea Maternal Grandfather    • Heart disease Other    • Stroke Other    • Hypertension Other    • Diabetes Other    • Crohn's disease Other         Maternal Grandmother - Ana Rosa Santamaria   • Diverticulitis Other         Maternal Grandmother - Ana Rosa Santamaria   • Colon polyps Other         Maternal Grandmother - Ana Rosa Santamaria     OB History        2    Para   2    Term   2            AB        Living   2       SAB        TAB        Ectopic        Molar        Multiple   0    Live Births   2              Prior to Admission medications    Medication Sig Start Date End Date Taking? Authorizing Provider   ibuprofen (ADVIL,MOTRIN) 600 MG tablet Take 1 tablet by mouth Every 8 (Eight) Hours As Needed for Mild Pain . 10/30/19  Yes Esteban Sands MD   metFORMIN (GLUCOPHAGE) 500 MG tablet Take 1 tablet by mouth Daily With Breakfast. 19 Yes Diandra Robins APRN   pantoprazole (PROTONIX) 40 MG EC tablet Take 1 tablet by mouth Daily. 19  Yes Lata Kenny APRN   sertraline (ZOLOFT) 50 MG tablet  "Take 1 tablet by mouth Daily. 10/30/19  Yes Esteban Sands MD   polyethylene glycol (GoLYTELY) 236 g solution Starting at noon on day prior to procedure, drink 8 ounces every 30 minutes until all gone or stools are clear. May add flavor packet. 20 Yes Lata Kenny APRN   sucralfate (CARAFATE) 1 g tablet Take 1 tablet by mouth 4 (Four) Times a Day. 19 Yes Lata Kenny APRN     No Known Allergies  Social History     Socioeconomic History   • Marital status: Single     Spouse name: Not on file   • Number of children: 2   • Years of education: Not on file   • Highest education level: Not on file   Occupational History   • Occupation:       Employer: [employer]     Comment: Patient resides with children.   Tobacco Use   • Smoking status: Former Smoker     Packs/day: 1.00     Years: 13.00     Pack years: 13.00     Last attempt to quit: 2019     Years since quittin.3   • Smokeless tobacco: Never Used   Substance and Sexual Activity   • Alcohol use: No   • Drug use: No   • Sexual activity: Yes     Partners: Male     Birth control/protection: Tubal ligation       Review of Systems  Review of Systems   Constitutional: Positive for fatigue. Negative for activity change, appetite change, chills, diaphoresis, fever and unexpected weight change.   HENT: Negative for sore throat and trouble swallowing.    Respiratory: Negative for shortness of breath.    Gastrointestinal: Positive for abdominal pain and constipation. Negative for abdominal distention, anal bleeding, blood in stool, diarrhea, nausea, rectal pain and vomiting.   Musculoskeletal: Negative for arthralgias.   Skin: Negative for pallor.   Neurological: Negative for light-headedness.        /64 (BP Location: Left arm)   Pulse 96   Ht 162.6 cm (64\")   Wt 109 kg (241 lb)   BMI 41.37 kg/m²     Objective    Physical Exam   Constitutional: She is oriented to person, place, and time. She appears well-developed and " well-nourished. She is cooperative. No distress.   HENT:   Head: Normocephalic and atraumatic.   Neck: Normal range of motion. Neck supple. No thyromegaly present.   Cardiovascular: Normal rate, regular rhythm and normal heart sounds.   Pulmonary/Chest: Effort normal and breath sounds normal. She has no wheezes. She has no rhonchi. She has no rales.   Abdominal: Soft. Normal appearance and bowel sounds are normal. She exhibits no distension. There is no hepatosplenomegaly. There is generalized tenderness. There is no rigidity and no guarding. No hernia.   Lymphadenopathy:     She has no cervical adenopathy.   Neurological: She is alert and oriented to person, place, and time.   Skin: Skin is warm, dry and intact. No rash noted. No pallor.   Psychiatric: She has a normal mood and affect. Her speech is normal.     Admission on 01/15/2020, Discharged on 01/15/2020   Component Date Value Ref Range Status   • Glucose 01/15/2020 77  70 - 130 mg/dL Final    [facility] [id number] [id number]   • Case Report 01/15/2020    Final                    Value:Surgical Pathology Report                         Case: [id number]                                  Authorizing Provider:  Sterling Neal MD        Collected:           01/15/2020 02:19 PM          Ordering Location:     [facility]             Received:            01/16/2020 06:58 AM                                 [place] ENDO SUITES                                                     Pathologist:           Pedro Arteaga MD                                                         Specimens:   1) - Small Intestine, Ileum, terminal ileum                                                         2) - Large Intestine, colonic mucosa                                                      • Final Diagnosis 01/15/2020    Final                    Value:This result contains rich text formatting which cannot be displayed here.   • Gross Description  01/15/2020    Final                    Value:This result contains rich text formatting which cannot be displayed here.     Assessment/Plan      1. Gastroesophageal reflux disease with esophagitis    2. Nausea    3. Irritable bowel syndrome with constipation    .   Recommend she continue PPI daily.  Start patient on dicyclomine for irritable bowel syndrome but discussed with patient can worsen constipation and other side effects of medication she will contact office if she cannot tolerate medication.  Recommend fiber daily to help regulate bowel pattern.  Follow-up in 2 month for recheck sooner if needed    Orders placed during this encounter include:  No orders of the defined types were placed in this encounter.      * Surgery not found *    Review and/or summary of lab tests, radiology, procedures, medications. Review and summary of old records and obtaining of history. The risks and benefits of my recommendations, as well as other treatment options were discussed with the patient today. Questions were answered.    New Medications Ordered This Visit   Medications   • dicyclomine (BENTYL) 20 MG tablet     Sig: Take 1 tablet by mouth 4 (Four) Times a Day Before Meals & at Bedtime As Needed (cramping) for up to 30 days.     Dispense:  90 tablet     Refill:  5       Follow-up: Return in about 8 weeks (around 4/8/2020).          This document has been electronically signed by PETTY Marshall on February 13, 2020 11:43 AM             Results for orders placed or performed during the hospital encounter of 01/15/20   Tissue Pathology Exam   Result Value Ref Range    Case Report       Surgical Pathology Report                         Case: [id number]                                  Authorizing Provider:  Sterling Neal MD        Collected:           01/15/2020 02:19 PM          Ordering Location:     [facility]             Received:            01/16/2020 06:58 AM                                 [facility]  "SUITES                                                     Pathologist:           Pedro Arteaga MD                                                         Specimens:   1) - Small Intestine, Ileum, terminal ileum                                                         2) - Large Intestine, colonic mucosa                                                       Final Diagnosis       1.  MUCOSA, TERMINAL ILEUM:  NO SIGNIFICANT PATHOLOGIC DIAGNOSIS.    2.  MUCOSA, COLON:  NO SIGNIFICANT HISTOLOGIC ABNORMALITY.      Gross Description       1.  The first container is labeled \"terminal ileum\" and has nodular bits of white soft tissue measuring 0.4 cc in aggregate.  The entire specimen is embedded as 1A.    2.  The second container is labeled \"mucosa of colon\" and has nodular bits of white soft tissue measuring 0.5 cc in aggregate.  The entire specimen is embedded as 2A.     POC Glucose Once   Result Value Ref Range    Glucose 77 70 - 130 mg/dL   Results for orders placed or performed in visit on 11/18/19   Comprehensive Metabolic Panel   Result Value Ref Range    Glucose 79 65 - 99 mg/dL    BUN 17 6 - 20 mg/dL    Creatinine 0.64 0.57 - 1.00 mg/dL    Sodium 136 136 - 145 mmol/L    Potassium 4.2 3.5 - 5.2 mmol/L    Chloride 101 98 - 107 mmol/L    CO2 23.5 22.0 - 29.0 mmol/L    Calcium 9.2 8.6 - 10.5 mg/dL    Total Protein 7.3 6.0 - 8.5 g/dL    Albumin 4.50 3.50 - 5.20 g/dL    ALT (SGPT) 19 1 - 33 U/L    AST (SGOT) 14 1 - 32 U/L    Alkaline Phosphatase 75 39 - 117 U/L    Total Bilirubin 0.2 0.2 - 1.2 mg/dL    eGFR Non African Amer 112 >60 mL/min/1.73    Globulin 2.8 gm/dL    A/G Ratio 1.6 g/dL    BUN/Creatinine Ratio 26.6 (H) 7.0 - 25.0    Anion Gap 11.5 5.0 - 15.0 mmol/L   Results for orders placed or performed during the hospital encounter of 11/11/19   Tissue Pathology Exam   Result Value Ref Range    Case Report       Surgical Pathology Report                         Case: [id number]                                " "  Authorizing Provider:  Sterling Neal MD        Collected:           11/11/2019 02:13 PM          Ordering Location:     [facility]             Received:            11/12/2019 06:59 AM                                 [place] ENDO SUITES                                                     Pathologist:           Porfirio Quarles MD                                                           Specimens:   1) - Gastric, Antrum                                                                                2) - Esophagus, Distal                                                                     Final Diagnosis       1.  GASTRIC ANTRUM, BIOPSY:  REACTIVE GASTROPATHY.    2.  DISTAL ESOPHAGUS, BIOPSY:  CHRONIC ESOPHAGITIS.      Gross Description       1.  Specimen #1 is labeled \"ant\".  Three tan fragments measure 0.5 x 0.3 x 0.2 cm together.  Totally submitted.    2.  Specimen #2 is labeled \"DE\".  Two gray fragments measure 0.6 x 0.5 x 0.1 cm together.  Totally submitted.     POC Glucose Once   Result Value Ref Range    Glucose 87 70 - 130 mg/dL   Results for orders placed or performed in visit on 10/25/19   Pregnancy, Urine - Urine, Clean Catch   Result Value Ref Range    HCG, Urine QL Negative Negative   Results for orders placed or performed during the hospital encounter of 08/28/19   Gold Top - SST   Result Value Ref Range    Extra Tube Hold for add-ons.    Green Top (Gel)   Result Value Ref Range    Extra Tube Hold for add-ons.    CBC Auto Differential   Result Value Ref Range    WBC 13.68 (H) 3.40 - 10.80 10*3/mm3    RBC 4.82 3.77 - 5.28 10*6/mm3    Hemoglobin 14.1 12.0 - 15.9 g/dL    Hematocrit 42.4 34.0 - 46.6 %    MCV 88.0 79.0 - 97.0 fL    MCH 29.3 26.6 - 33.0 pg    MCHC 33.3 31.5 - 35.7 g/dL    RDW 12.7 12.3 - 15.4 %    RDW-SD 41.1 37.0 - 54.0 fl    MPV 10.7 6.0 - 12.0 fL    Platelets 259 140 - 450 10*3/mm3    Neutrophil % 59.2 42.7 - 76.0 %    Lymphocyte % 32.9 19.6 - 45.3 %    Monocyte % 3.9 (L) 5.0 - " 12.0 %    Eosinophil % 3.1 0.3 - 6.2 %    Basophil % 0.5 0.0 - 1.5 %    Immature Grans % 0.4 0.0 - 0.5 %    Neutrophils, Absolute 8.09 (H) 1.70 - 7.00 10*3/mm3    Lymphocytes, Absolute 4.50 (H) 0.70 - 3.10 10*3/mm3    Monocytes, Absolute 0.53 0.10 - 0.90 10*3/mm3    Eosinophils, Absolute 0.43 (H) 0.00 - 0.40 10*3/mm3    Basophils, Absolute 0.07 0.00 - 0.20 10*3/mm3    Immature Grans, Absolute 0.06 (H) 0.00 - 0.05 10*3/mm3    nRBC 0.0 0.0 - 0.2 /100 WBC   Lavender Top   Result Value Ref Range    Extra Tube hold for add-on    Light Blue Top   Result Value Ref Range    Extra Tube hold for add-on    Troponin   Result Value Ref Range    Troponin T <0.010 0.000 - 0.030 ng/mL   Comprehensive Metabolic Panel   Result Value Ref Range    Glucose 85 65 - 99 mg/dL    BUN 10 6 - 20 mg/dL    Creatinine 0.72 0.57 - 1.00 mg/dL    Sodium 139 136 - 145 mmol/L    Potassium 3.9 3.5 - 5.2 mmol/L    Chloride 102 98 - 107 mmol/L    CO2 24.0 22.0 - 29.0 mmol/L    Calcium 9.3 8.6 - 10.5 mg/dL    Total Protein 8.0 6.0 - 8.5 g/dL    Albumin 4.60 3.50 - 5.20 g/dL    ALT (SGPT) 19 1 - 33 U/L    AST (SGOT) 18 1 - 32 U/L    Alkaline Phosphatase 79 39 - 117 U/L    Total Bilirubin 0.4 0.2 - 1.2 mg/dL    eGFR Non African Amer 98 >60 mL/min/1.73    Globulin 3.4 gm/dL    A/G Ratio 1.4 g/dL    BUN/Creatinine Ratio 13.9 7.0 - 25.0    Anion Gap 13.0 5.0 - 15.0 mmol/L   Results for orders placed or performed in visit on 02/12/19   Testosterone, F Eqlib & T LC / MS   Result Value Ref Range    Testosterone, Total 45.0 10.0 - 55.0 ng/dL    Testosterone, Free 0.59 0.10 - 0.85 ng/dL    Testosterone, Free % 1.32 0.50 - 2.80 %   Sex Horm Binding Globulin   Result Value Ref Range    Sex Hormone Binding Globulin 45.4 24.6 - 122.0 nmol/L   Progesterone   Result Value Ref Range    Progesterone 0.4 ng/mL   Insulin, Total   Result Value Ref Range    Insulin 57.1 (H) 2.6 - 24.9 uIU/mL   Estradiol   Result Value Ref Range    Estradiol 245.5 pg/mL   TSH   Result Value  "Ref Range    TSH 1.390 0.460 - 4.680 mIU/mL   Luteinizing Hormone   Result Value Ref Range    LH 65.50 mIU/mL   Follicle Stimulating Hormone   Result Value Ref Range    FSH 11.20 mIU/mL   Results for orders placed or performed during the hospital encounter of 08/07/18   PREVIOUS HISTORY   Result Value Ref Range    Previous History Previous Record on File    Tissue Pathology Exam   Result Value Ref Range    Case Report       Surgical Pathology Report                         Case: [id number]                                  Authorizing Provider:  FridayElizabeth MD        Collected:           08/07/2018 07:44 AM          Ordering Location:     [facility]             Received:            08/07/2018 08:36 AM                                 [place] OR                                                              Pathologist:           Pedro Arteaga MD                                                         Specimen:    Fallopian Tubes, Bilateral                                                                 Final Diagnosis       FALLOPIAN TUBES (2), BILATERAL:  UNREMARKABLE FALLOPIAN TUBES (2).      Gross Description       The container is labeled \"bilateral fallopian tubes\" and has 7 segments of uterine tubal tissue.  The largest specimen measures 3.0 x 0.7 x 0.7 cm.  It is attached to a paratubal serous cyst measuring 0.9 cm in greatest dimension.  The second fragment of tissue has delicate fimbria and measures 2.0 x 0.7 x 0.7 cm.  The third fragment of tissue measures 4.0 x 0.8 x 0.7 cm.  Its fimbria are delicate.  The fourth fragment of tissue measures 2.0 x 0.8 x 0.7 cm.  The remaining 3 fragments measure 1.0 cc in aggregate.  Almost all of the tissue is embedded as 1A and 1B.     Antibody Identification   Result Value Ref Range    Anti-D ANTI-D    Type & Screen   Result Value Ref Range    ABO Type O     RH type Negative     Antibody Screen Positive     T&S Expiration Date 8/10/2018 11:59:59 PM  "   Results for orders placed or performed in visit on 08/03/18   PREVIOUS HISTORY   Result Value Ref Range    Previous History Previous Record on File    CBC Auto Differential   Result Value Ref Range    WBC 10.27 (H) 3.20 - 9.80 10*3/mm3    RBC 4.60 3.77 - 5.16 10*6/mm3    Hemoglobin 14.1 12.0 - 15.5 g/dL    Hematocrit 41.5 35.0 - 45.0 %    MCV 90.2 80.0 - 98.0 fL    MCH 30.7 26.5 - 34.0 pg    MCHC 34.0 31.4 - 36.0 g/dL    RDW 12.4 11.5 - 14.5 %    RDW-SD 40.3 36.4 - 46.3 fl    MPV 10.4 8.0 - 12.0 fL    Platelets 292 150 - 450 10*3/mm3    Neutrophil % 56.3 37.0 - 80.0 %    Lymphocyte % 36.7 10.0 - 50.0 %    Monocyte % 4.7 0.0 - 12.0 %    Eosinophil % 1.9 0.0 - 7.0 %    Basophil % 0.1 0.0 - 2.0 %    Immature Grans % 0.3 0.0 - 0.5 %    Neutrophils, Absolute 5.79 2.00 - 8.60 10*3/mm3    Lymphocytes, Absolute 3.77 0.60 - 4.20 10*3/mm3    Monocytes, Absolute 0.48 0.00 - 0.90 10*3/mm3    Eosinophils, Absolute 0.19 0.00 - 0.70 10*3/mm3    Basophils, Absolute 0.01 0.00 - 0.20 10*3/mm3    Immature Grans, Absolute 0.03 (H) 0.00 - 0.02 10*3/mm3     *Note: Due to a large number of results and/or encounters for the requested time period, some results have not been displayed. A complete set of results can be found in Results Review.

## 2020-03-03 ENCOUNTER — OFFICE VISIT (OUTPATIENT)
Dept: FAMILY MEDICINE CLINIC | Facility: CLINIC | Age: 25
End: 2020-03-03

## 2020-03-03 VITALS
SYSTOLIC BLOOD PRESSURE: 130 MMHG | BODY MASS INDEX: 42.44 KG/M2 | HEART RATE: 94 BPM | OXYGEN SATURATION: 94 % | WEIGHT: 248.6 LBS | HEIGHT: 64 IN | TEMPERATURE: 98.4 F | DIASTOLIC BLOOD PRESSURE: 78 MMHG

## 2020-03-03 DIAGNOSIS — F41.9 ANXIETY: ICD-10-CM

## 2020-03-03 DIAGNOSIS — R20.0 NUMBNESS OF FACE: Primary | ICD-10-CM

## 2020-03-03 PROCEDURE — 99214 OFFICE O/P EST MOD 30 MIN: CPT | Performed by: FAMILY MEDICINE

## 2020-03-03 RX ORDER — TOPIRAMATE 50 MG/1
TABLET, FILM COATED ORAL
Qty: 60 TABLET | Refills: 1 | Status: SHIPPED | OUTPATIENT
Start: 2020-03-03 | End: 2021-04-09

## 2020-03-03 NOTE — PROGRESS NOTES
" Subjective   Marleny Conn is a 24 y.o. female.     History of Present Illness     1 week, left side of face numb, pointed to left side of face then also v2 and v3.  No headache but with more discussion, she says it feels like there is a headache in her left eye?  No weakness or facial drooping.  No slurring of speech. The numbness is sharply down left side of bridge of nose.   Wants wellbutrin instead of zoloft, she thinks zoloft making her angry.   Did not complete sleep study, never inquired more regarding results.  She says she was never called.  This was months ago.  She wants wellbutrin because it helped her lose weight in the past and she wants to quite smoking  She has had no fever or other symptoms.  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  She says she has episodes of not being able to move?  She was \"hooked up\" to a machine, and told the seizures were internal?  She did not recognize the term pseudoseizures.  She tried bentyl few weeks back, I did not help at all.        Review of Systems   Constitutional: Negative for chills, fatigue and fever.   HENT: Negative for congestion, ear discharge, ear pain, facial swelling, hearing loss, postnasal drip, rhinorrhea, sinus pressure, sore throat, trouble swallowing and voice change.    Eyes: Negative for discharge, redness and visual disturbance.   Respiratory: Negative for cough, chest tightness, shortness of breath and wheezing.    Cardiovascular: Negative for chest pain and palpitations.   Gastrointestinal: Negative for abdominal pain, blood in stool, constipation, diarrhea, nausea and vomiting.   Endocrine: Negative for polydipsia and polyuria.   Genitourinary: Negative for dysuria, flank pain, hematuria and urgency.   Musculoskeletal: Negative for arthralgias, back pain, joint swelling and myalgias.   Skin: Negative for rash.   Neurological: Positive for numbness. Negative for dizziness, weakness and headaches.   Hematological: Negative " "for adenopathy.   Psychiatric/Behavioral: Negative for confusion and sleep disturbance. The patient is not nervous/anxious.            /78 (BP Location: Left arm, Patient Position: Sitting, Cuff Size: Adult)   Pulse 94   Temp 98.4 °F (36.9 °C) (Temporal)   Ht 162.2 cm (63.86\")   Wt 113 kg (248 lb 9.6 oz)   SpO2 94%   BMI 42.86 kg/m²       Objective     Physical Exam   Constitutional: She is oriented to person, place, and time. She appears well-developed and well-nourished.   HENT:   Head: Normocephalic and atraumatic.   Right Ear: External ear normal.   Left Ear: External ear normal.   Nose: Nose normal.   Eyes: Pupils are equal, round, and reactive to light. Conjunctivae and EOM are normal.   Neck: Normal range of motion. Neck supple.   Cardiovascular: Normal rate, regular rhythm and normal heart sounds. Exam reveals no gallop and no friction rub.   No murmur heard.  Pulmonary/Chest: Effort normal and breath sounds normal.   Abdominal: Soft. Bowel sounds are normal. She exhibits no distension. There is no tenderness. There is no rebound and no guarding.   Musculoskeletal: Normal range of motion. She exhibits no edema or deformity.   Neurological: She is alert and oriented to person, place, and time. No cranial nerve deficit.   No facial weakness noted.  perrl  She reported some discomfort when shining light in left eye   Skin: Skin is warm and dry. No rash noted. No erythema.   Psychiatric: She has a normal mood and affect. Her behavior is normal. Judgment and thought content normal.   Nursing note and vitals reviewed.          PAST MEDICAL HISTORY     Past Medical History:   Diagnosis Date   • Acid reflux    • Allergic rhinitis    • Anxiety    • Anxiety    • Bronchitis     probable   • Common cold    • Conjunctivitis    • Dysfunction of eustachian tube    • Examination of eyes and vision     general; NORMAL   • Gastroenteritis    • Headache    • Hemorrhoids     likely   • Infection of skin and " subcutaneous tissue    • Migraine    • Nonvenomous insect bite    • Open wound of lower limb    • Pharyngitis    • Rhinitis    • Tick bite    • URI (upper respiratory infection)       PAST SURGICAL HISTORY     Past Surgical History:   Procedure Laterality Date   • COLONOSCOPY N/A 1/15/2020    Procedure: COLONOSCOPY;  Surgeon: Sterling Neal MD;  Location: [facility] ENDOSCOPY;  Service: Gastroenterology   • ENDOSCOPY N/A 2019    Procedure: ESOPHAGOGASTRODUODENOSCOPY;  Surgeon: Sterling Neal MD;  Location: [facility] ENDOSCOPY;  Service: Gastroenterology   • SALPINGECTOMY Bilateral 2018    Procedure: SALPINGECTOMY LAPAROSCOPIC;  Surgeon: FridayElizabeth MD;  Location: [facility] OR;  Service: Obstetrics/Gynecology   • WISDOM TOOTH EXTRACTION        SOCIAL HISTORY     Social History     Socioeconomic History   • Marital status: Single     Spouse name: Not on file   • Number of children: 2   • Years of education: Not on file   • Highest education level: Not on file   Occupational History   • Occupation:       Employer: [employer]     Comment: Patient resides with children.   Tobacco Use   • Smoking status: Former Smoker     Packs/day: 1.00     Years: 13.00     Pack years: 13.00     Last attempt to quit: 2019     Years since quittin.4   • Smokeless tobacco: Never Used   Substance and Sexual Activity   • Alcohol use: No   • Drug use: No   • Sexual activity: Yes     Partners: Male     Birth control/protection: Tubal ligation      ALLERGIES   Patient has no known allergies.   MEDICATIONS     Current Outpatient Medications   Medication Sig Dispense Refill   • dicyclomine (BENTYL) 20 MG tablet Take 1 tablet by mouth 4 (Four) Times a Day Before Meals & at Bedtime As Needed (cramping) for up to 30 days. 90 tablet 5   • pantoprazole (PROTONIX) 40 MG EC tablet Take 1 tablet by mouth Daily. 30 tablet 5   • sertraline (ZOLOFT) 50 MG tablet Take 1 tablet by mouth Daily. 90 tablet 3   • ibuprofen (ADVIL,MOTRIN)  600 MG tablet Take 1 tablet by mouth Every 8 (Eight) Hours As Needed for Mild Pain . 90 tablet 1   • topiramate (TOPAMAX) 50 MG tablet 25mg hs 7 nights, then 25mg bid 7 days, then 25mg am and 50mg hs 7 days then 50mg bid . 60 tablet 1     No current facility-administered medications for this visit.         The following portions of the patient's history were reviewed and updated as appropriate: allergies, current medications, past family history, past medical history, past social history, past surgical history and problem list.        Assessment/Plan   Marleny was seen today for numbness.    Diagnoses and all orders for this visit:    Numbness of face  -     Ambulatory Referral to Neurology    Anxiety    Other orders  -     topiramate (TOPAMAX) 50 MG tablet; 25mg hs 7 nights, then 25mg bid 7 days, then 25mg am and 50mg hs 7 days then 50mg bid .    probably a migraine without a headache.  Since new, send to neurologist.  Try topamax, stop at dose that helps her symptoms.    Warned kidney stones and drink oj dialy  She likes that it may help lose weight  Discussed pills are not the answer.  Stay on zoloft, she says it was helping.  I TOLD HER TO CALL SLEEP PEOPLE AND DISCUSS HER SLEEP STUDY.  SHE SAYS THE MACHINE BROKE EARLY IN THE NIGHT OF TESTING?                     No follow-ups on file.                  This document has been electronically signed by Esteban Sands MD on March 3, 2020 2:35 PM

## 2020-03-04 ENCOUNTER — HOSPITAL ENCOUNTER (EMERGENCY)
Facility: HOSPITAL | Age: 25
Discharge: HOME | End: 2020-03-05
Attending: EMERGENCY MEDICINE | Admitting: EMERGENCY MEDICINE

## 2020-03-04 DIAGNOSIS — G43.809 OTHER MIGRAINE WITHOUT STATUS MIGRAINOSUS, NOT INTRACTABLE: Primary | ICD-10-CM

## 2020-03-04 PROCEDURE — 96374 THER/PROPH/DIAG INJ IV PUSH: CPT

## 2020-03-04 PROCEDURE — 96375 TX/PRO/DX INJ NEW DRUG ADDON: CPT

## 2020-03-04 PROCEDURE — 99283 EMERGENCY DEPT VISIT LOW MDM: CPT

## 2020-03-04 RX ORDER — SODIUM CHLORIDE 0.9 % (FLUSH) 0.9 %
10 SYRINGE (ML) INJECTION AS NEEDED
Status: DISCONTINUED | OUTPATIENT
Start: 2020-03-04 | End: 2020-03-05 | Stop reason: HOSPADM

## 2020-03-04 RX ADMIN — Medication 10 ML: at 23:51

## 2020-03-05 ENCOUNTER — APPOINTMENT (OUTPATIENT)
Dept: CT IMAGING | Facility: HOSPITAL | Age: 25
End: 2020-03-05

## 2020-03-05 VITALS
WEIGHT: 250 LBS | OXYGEN SATURATION: 97 % | TEMPERATURE: 98.2 F | HEART RATE: 68 BPM | SYSTOLIC BLOOD PRESSURE: 114 MMHG | BODY MASS INDEX: 41.65 KG/M2 | DIASTOLIC BLOOD PRESSURE: 64 MMHG | HEIGHT: 65 IN | RESPIRATION RATE: 18 BRPM

## 2020-03-05 LAB
B-HCG UR QL: NEGATIVE
HOLD SPECIMEN: NORMAL
HOLD SPECIMEN: NORMAL
WHOLE BLOOD HOLD SPECIMEN: NORMAL
WHOLE BLOOD HOLD SPECIMEN: NORMAL

## 2020-03-05 PROCEDURE — 25010000002 DIPHENHYDRAMINE PER 50 MG: Performed by: EMERGENCY MEDICINE

## 2020-03-05 PROCEDURE — 25010000002 METOCLOPRAMIDE PER 10 MG: Performed by: EMERGENCY MEDICINE

## 2020-03-05 PROCEDURE — 81025 URINE PREGNANCY TEST: CPT | Performed by: EMERGENCY MEDICINE

## 2020-03-05 PROCEDURE — 70450 CT HEAD/BRAIN W/O DYE: CPT

## 2020-03-05 PROCEDURE — 25010000002 KETOROLAC TROMETHAMINE PER 15 MG: Performed by: EMERGENCY MEDICINE

## 2020-03-05 RX ORDER — ACETAMINOPHEN 500 MG
1000 TABLET ORAL ONCE
Status: COMPLETED | OUTPATIENT
Start: 2020-03-05 | End: 2020-03-05

## 2020-03-05 RX ORDER — DIPHENHYDRAMINE HYDROCHLORIDE 50 MG/ML
25 INJECTION INTRAMUSCULAR; INTRAVENOUS ONCE
Status: COMPLETED | OUTPATIENT
Start: 2020-03-05 | End: 2020-03-05

## 2020-03-05 RX ORDER — METOCLOPRAMIDE HYDROCHLORIDE 5 MG/ML
10 INJECTION INTRAMUSCULAR; INTRAVENOUS ONCE
Status: COMPLETED | OUTPATIENT
Start: 2020-03-05 | End: 2020-03-05

## 2020-03-05 RX ORDER — KETOROLAC TROMETHAMINE 30 MG/ML
30 INJECTION, SOLUTION INTRAMUSCULAR; INTRAVENOUS ONCE
Status: COMPLETED | OUTPATIENT
Start: 2020-03-05 | End: 2020-03-05

## 2020-03-05 RX ADMIN — KETOROLAC TROMETHAMINE 30 MG: 30 INJECTION, SOLUTION INTRAMUSCULAR at 00:42

## 2020-03-05 RX ADMIN — METOCLOPRAMIDE 10 MG: 5 INJECTION, SOLUTION INTRAMUSCULAR; INTRAVENOUS at 00:42

## 2020-03-05 RX ADMIN — DIPHENHYDRAMINE HYDROCHLORIDE 25 MG: 50 INJECTION INTRAMUSCULAR; INTRAVENOUS at 00:42

## 2020-03-05 RX ADMIN — ACETAMINOPHEN 1000 MG: 500 TABLET ORAL at 00:42

## 2020-03-05 NOTE — ED PROVIDER NOTES
Subjective   24 years old female presented in the ER with chief complaint of headache and left facial numbness.  Patient reports she has been having headaches off and on and since start of February she is having numbness on the left face.  Patient was evaluated at primary care office recently and was started on Topamax which does not seem helping.  Patient report numbness in the whole left upper and lower face but no weakness.  No drooling.  No difficulty speech or visual disturbance.  Patient reports she woke up with the pressure in the frontal and bitemporal area moderate to severe intensity without any significant aggravating or relieving factors.  She denies any other focal neuro symptoms.      History provided by:  Patient  Headache   Pain location:  Frontal  Quality:  Sharp  Radiates to:  Does not radiate  Severity currently:  7/10  Severity at highest:  7/10  Onset quality:  Gradual  Timing:  Intermittent  Progression:  Worsening  Chronicity:  New  Relieved by:  Nothing  Worsened by:  Activity  Ineffective treatments:  None tried  Associated symptoms: numbness    Associated symptoms: no abdominal pain, no back pain, no blurred vision, no congestion, no cough, no diarrhea, no dizziness, no drainage, no ear pain, no eye pain, no facial pain, no fatigue, no fever, no focal weakness, no hearing loss, no loss of balance, no myalgias, no nausea, no neck stiffness, no seizures, no sinus pressure, no sore throat, no swollen glands, no tingling, no visual change, no vomiting and no weakness        Review of Systems   Constitutional: Negative for chills, fatigue and fever.   HENT: Negative for congestion, ear pain, hearing loss, postnasal drip, sinus pressure and sore throat.    Eyes: Negative for blurred vision and pain.   Respiratory: Negative for cough.    Cardiovascular: Negative for chest pain and palpitations.   Gastrointestinal: Negative for abdominal pain, diarrhea, nausea and vomiting.   Genitourinary:  Negative for flank pain.   Musculoskeletal: Negative for back pain, myalgias and neck stiffness.   Skin: Negative for color change.   Neurological: Positive for numbness and headaches. Negative for dizziness, focal weakness, seizures, weakness and loss of balance.   Psychiatric/Behavioral: Negative for agitation.       Past Medical History:   Diagnosis Date   • Acid reflux    • Allergic rhinitis    • Anxiety    • Anxiety    • Bronchitis     probable   • Common cold    • Conjunctivitis    • Dysfunction of eustachian tube    • Examination of eyes and vision     general; NORMAL   • Gastroenteritis    • Headache    • Hemorrhoids     likely   • Infection of skin and subcutaneous tissue    • Migraine    • Nonvenomous insect bite    • Open wound of lower limb    • Pharyngitis    • Rhinitis    • Tick bite    • URI (upper respiratory infection)        No Known Allergies    Past Surgical History:   Procedure Laterality Date   • COLONOSCOPY N/A 1/15/2020    Procedure: COLONOSCOPY;  Surgeon: Sterling Neal MD;  Location: [facility] ENDOSCOPY;  Service: Gastroenterology   • ENDOSCOPY N/A 11/11/2019    Procedure: ESOPHAGOGASTRODUODENOSCOPY;  Surgeon: Sterling Neal MD;  Location: [facility] ENDOSCOPY;  Service: Gastroenterology   • SALPINGECTOMY Bilateral 8/7/2018    Procedure: SALPINGECTOMY LAPAROSCOPIC;  Surgeon: FridayElizabeth MD;  Location: [facility] OR;  Service: Obstetrics/Gynecology   • WISDOM TOOTH EXTRACTION         Family History   Problem Relation Age of Onset   • Hypertension Brother    • Hypertension Paternal Grandmother    • Hypertension Maternal Grandmother    • Diabetes Maternal Grandmother    • No Known Problems Father    • No Known Problems Mother    • Heart attack Paternal Grandfather    • Hypertension Maternal Grandfather    • Sleep apnea Maternal Grandfather    • Heart disease Other    • Stroke Other    • Hypertension Other    • Diabetes Other    • Crohn's disease Other         Maternal Grandmother - Ana Rosa  Hina   • Diverticulitis Other         Maternal Grandmother - Ana Rosa Santamaria   • Colon polyps Other         Maternal Grandmother - Ana Rosa Santamaria       Social History     Socioeconomic History   • Marital status: Single     Spouse name: Not on file   • Number of children: 2   • Years of education: Not on file   • Highest education level: Not on file   Occupational History   • Occupation:       Employer: [employer]     Comment: Patient resides with children.   Tobacco Use   • Smoking status: Former Smoker     Packs/day: 1.00     Years: 13.00     Pack years: 13.00     Last attempt to quit: 2019     Years since quittin.4   • Smokeless tobacco: Never Used   Substance and Sexual Activity   • Alcohol use: No   • Drug use: No   • Sexual activity: Yes     Partners: Male     Birth control/protection: Tubal ligation           Objective   Physical Exam   Constitutional: She is oriented to person, place, and time. She appears well-developed and well-nourished.   HENT:   Head: Normocephalic and atraumatic.   Right Ear: External ear normal.   Left Ear: External ear normal.   Nose: Nose normal.   Mouth/Throat: Oropharynx is clear and moist.   Eyes: Pupils are equal, round, and reactive to light. Conjunctivae and EOM are normal.   Neck: Normal range of motion. Neck supple.   Cardiovascular: Normal rate, regular rhythm and normal heart sounds.   Pulmonary/Chest: Effort normal and breath sounds normal. She has no wheezes.   Abdominal: Soft. There is no tenderness.   Musculoskeletal: Normal range of motion.   Neurological: She is alert and oriented to person, place, and time. She displays normal reflexes. No cranial nerve deficit or sensory deficit. She exhibits normal muscle tone. Coordination normal. GCS eye subscore is 4. GCS verbal subscore is 5. GCS motor subscore is 6. She displays no Babinski's sign on the right side. She displays no Babinski's sign on the left side.   Reflex Scores:       Bicep reflexes are  2+ on the right side and 2+ on the left side.       Patellar reflexes are 2+ on the right side and 2+ on the left side.  Decreased sensations of touch left upper and lower face . No weakness    Skin: Skin is warm and dry. Capillary refill takes less than 2 seconds.   Psychiatric: She has a normal mood and affect.   Nursing note and vitals reviewed.      Procedures           ED Course                                           MDM  Number of Diagnoses or Management Options  Other migraine without status migrainosus, not intractable:   Diagnosis management comments: She is given migraine cocktail, on reevaluation her headache is improved.  On reevaluation she does not have any loss of sensations of touch and numbness is improved.  She feels back to normal.  Patient report this is the first time she has been feeling normal in more than a month's time.  I think patient has complex migraine with some element of anxiety as well, recommended outpatient neurology follow-up and continue with Topamax.  I have also obtained CT head which is negative.  Discussed signs symptoms of worsening needing return to ER which he seems understanding.       Amount and/or Complexity of Data Reviewed  Clinical lab tests: ordered and reviewed  Tests in the radiology section of CPT®: ordered and reviewed      Labs Reviewed   PREGNANCY, URINE - Normal   RAINBOW DRAW    Narrative:     The following orders were created for panel order [place] [facility] Draw.  Procedure                               Abnormality         Status                     ---------                               -----------         ------                     Light Blue Top[[id number]]                                   Final result               Green Top (Gel)[[id number]]                                  Final result               Lavender Top[[id number]]                                     Final result               Gold Top - SST[[id number]]                                   Final result                  Please view results for these tests on the individual orders.   LIGHT BLUE TOP   GREEN TOP   LAVENDER TOP   GOLD TOP - SST       Ct Head Without Contrast    Result Date: 3/5/2020  Narrative: Exam: Head CT without contrast INDICATION: Headache, facial numbness TECHNIQUE: Routine head CT without contrast. Sagittal coronal reconstructions were obtained. This exam was performed according to our departmental dose-optimization program, which includes automated exposure control, adjustment of the mA and/or kV according to patient size and/or use of iterative reconstruction technique. FINDINGS: The bony calvarium is intact. Imaged paranasal sinuses and mastoid air cells are clear. No extra-axial fluid collection. The ventricular system is normal. Normal gray-white differentiation. No obvious sign of acute infarction. No intraparenchymal hemorrhage, mass or midline shift.     Impression: No acute intracranial abnormality. Electronically signed by:  Xander Valderrama MD  3/5/2020 1:11 AM CST Workstation: [id number]          Final diagnoses:   Other migraine without status migrainosus, not intractable            Marcio Lay MD  03/05/20 0133       Marcio Lay MD  03/07/20 7213

## 2020-03-05 NOTE — DISCHARGE INSTRUCTIONS
Follow-up with primary care and neurology for reevaluation.  Continue with Topamax.  Take Tylenol/ibuprofen as needed.  Return to ER for any worsening headache, numbness tingling or focal weakness.

## 2020-05-11 ENCOUNTER — OFFICE VISIT (OUTPATIENT)
Dept: FAMILY MEDICINE CLINIC | Facility: CLINIC | Age: 25
End: 2020-05-11

## 2020-05-11 VITALS
WEIGHT: 242.2 LBS | SYSTOLIC BLOOD PRESSURE: 100 MMHG | BODY MASS INDEX: 41.35 KG/M2 | HEART RATE: 94 BPM | TEMPERATURE: 97.3 F | OXYGEN SATURATION: 98 % | HEIGHT: 64 IN | DIASTOLIC BLOOD PRESSURE: 60 MMHG

## 2020-05-11 DIAGNOSIS — F41.9 ANXIETY: Primary | ICD-10-CM

## 2020-05-11 DIAGNOSIS — R51.9 NONINTRACTABLE HEADACHE, UNSPECIFIED CHRONICITY PATTERN, UNSPECIFIED HEADACHE TYPE: ICD-10-CM

## 2020-05-11 PROCEDURE — 99213 OFFICE O/P EST LOW 20 MIN: CPT | Performed by: FAMILY MEDICINE

## 2020-05-11 RX ORDER — SERTRALINE HYDROCHLORIDE 100 MG/1
100 TABLET, FILM COATED ORAL DAILY
Qty: 30 TABLET | Refills: 11 | Status: SHIPPED | OUTPATIENT
Start: 2020-05-11 | End: 2021-04-09

## 2020-05-11 NOTE — PATIENT INSTRUCTIONS

## 2020-05-11 NOTE — PROGRESS NOTES
" Subjective   Marleny Conn is a 24 y.o. female.     History of Present Illness     Increasing headaches, anxiety, and emotional  On zoloft 50mg qd and topamax 50mg bid.   She is stressed  Single mom, working, 3 yo and 3 yo    Review of Systems   Constitutional: Negative for chills, fatigue and fever.   HENT: Negative for congestion, ear discharge, ear pain, facial swelling, hearing loss, postnasal drip, rhinorrhea, sinus pressure, sore throat, trouble swallowing and voice change.    Eyes: Negative for discharge, redness and visual disturbance.   Respiratory: Negative for cough, chest tightness, shortness of breath and wheezing.    Cardiovascular: Negative for chest pain and palpitations.   Gastrointestinal: Negative for abdominal pain, blood in stool, constipation, diarrhea, nausea and vomiting.   Endocrine: Negative for polydipsia and polyuria.   Genitourinary: Negative for dysuria, flank pain, hematuria and urgency.   Musculoskeletal: Negative for arthralgias, back pain, joint swelling and myalgias.   Skin: Negative for rash.   Neurological: Positive for headaches. Negative for dizziness, weakness and numbness.   Hematological: Negative for adenopathy.   Psychiatric/Behavioral: Negative for confusion and sleep disturbance. The patient is nervous/anxious.            /60 (BP Location: Left arm, Patient Position: Sitting, Cuff Size: Adult)   Pulse 94   Temp 97.3 °F (36.3 °C) (Temporal)   Ht 163.8 cm (64.49\")   Wt 110 kg (242 lb 3.2 oz)   SpO2 98%   BMI 40.95 kg/m²       Objective     Physical Exam   Constitutional: She is oriented to person, place, and time. She appears well-developed and well-nourished.   HENT:   Head: Normocephalic and atraumatic.   Right Ear: External ear normal.   Left Ear: External ear normal.   Nose: Nose normal.   Eyes: Pupils are equal, round, and reactive to light. Conjunctivae and EOM are normal.   Neck: Normal range of motion.   Pulmonary/Chest: Effort normal. "   Musculoskeletal: Normal range of motion.   Neurological: She is alert and oriented to person, place, and time.   Psychiatric: She has a normal mood and affect. Her behavior is normal. Judgment and thought content normal.   Nursing note and vitals reviewed.          PAST MEDICAL HISTORY     Past Medical History:   Diagnosis Date   • Acid reflux    • Allergic rhinitis    • Anxiety    • Anxiety    • Bronchitis     probable   • Common cold    • Conjunctivitis    • Dysfunction of eustachian tube    • Examination of eyes and vision     general; NORMAL   • Gastroenteritis    • Headache    • Hemorrhoids     likely   • Infection of skin and subcutaneous tissue    • Migraine    • Nonvenomous insect bite    • Open wound of lower limb    • Pharyngitis    • Rhinitis    • Tick bite    • URI (upper respiratory infection)       PAST SURGICAL HISTORY     Past Surgical History:   Procedure Laterality Date   • COLONOSCOPY N/A 1/15/2020    Procedure: COLONOSCOPY;  Surgeon: Sterling Neal MD;  Location: [facility] ENDOSCOPY;  Service: Gastroenterology   • ENDOSCOPY N/A 2019    Procedure: ESOPHAGOGASTRODUODENOSCOPY;  Surgeon: Sterling Neal MD;  Location: [facility] ENDOSCOPY;  Service: Gastroenterology   • SALPINGECTOMY Bilateral 2018    Procedure: SALPINGECTOMY LAPAROSCOPIC;  Surgeon: FridayElizabeth MD;  Location: [facility] OR;  Service: Obstetrics/Gynecology   • WISDOM TOOTH EXTRACTION        SOCIAL HISTORY     Social History     Socioeconomic History   • Marital status: Single     Spouse name: Not on file   • Number of children: 2   • Years of education: Not on file   • Highest education level: Not on file   Occupational History   • Occupation:       Employer: [employer]     Comment: Patient resides with children.   Tobacco Use   • Smoking status: Former Smoker     Packs/day: 1.00     Years: 13.00     Pack years: 13.00     Last attempt to quit: 2019     Years since quittin.6   • Smokeless tobacco: Never Used    Substance and Sexual Activity   • Alcohol use: No   • Drug use: No   • Sexual activity: Yes     Partners: Male     Birth control/protection: Tubal ligation      ALLERGIES   Patient has no known allergies.   MEDICATIONS     Current Outpatient Medications   Medication Sig Dispense Refill   • ibuprofen (ADVIL,MOTRIN) 600 MG tablet Take 1 tablet by mouth Every 8 (Eight) Hours As Needed for Mild Pain . 90 tablet 1   • topiramate (TOPAMAX) 50 MG tablet 25mg hs 7 nights, then 25mg bid 7 days, then 25mg am and 50mg hs 7 days then 50mg bid . 60 tablet 1   • pantoprazole (PROTONIX) 40 MG EC tablet Take 1 tablet by mouth Daily. 30 tablet 5   • sertraline (Zoloft) 100 MG tablet Take 1 tablet by mouth Daily. 30 tablet 11     No current facility-administered medications for this visit.         The following portions of the patient's history were reviewed and updated as appropriate: allergies, current medications, past family history, past medical history, past social history, past surgical history and problem list.        Assessment/Plan   Marleny was seen today for anxiety.    Diagnoses and all orders for this visit:    Anxiety    Nonintractable headache, unspecified chronicity pattern, unspecified headache type    Other orders  -     sertraline (Zoloft) 100 MG tablet; Take 1 tablet by mouth Daily.    take 75mg zoloft until her 50mg tablets finished then go to 100mg zoloft  Stay on topamax for now, plan will try tapering off of topamax.  She is drinking orange juice                     No follow-ups on file.                  This document has been electronically signed by Esteban Sands MD on May 11, 2020 17:54

## 2020-10-13 ENCOUNTER — OFFICE VISIT (OUTPATIENT)
Dept: FAMILY MEDICINE CLINIC | Facility: CLINIC | Age: 25
End: 2020-10-13

## 2020-10-13 VITALS
HEART RATE: 93 BPM | TEMPERATURE: 98.7 F | SYSTOLIC BLOOD PRESSURE: 110 MMHG | WEIGHT: 248.4 LBS | HEIGHT: 64 IN | BODY MASS INDEX: 42.41 KG/M2 | DIASTOLIC BLOOD PRESSURE: 60 MMHG | OXYGEN SATURATION: 98 %

## 2020-10-13 DIAGNOSIS — R19.7 DIARRHEA, UNSPECIFIED TYPE: ICD-10-CM

## 2020-10-13 DIAGNOSIS — R10.9 ABDOMINAL CRAMPING: Primary | ICD-10-CM

## 2020-10-13 PROCEDURE — 99213 OFFICE O/P EST LOW 20 MIN: CPT | Performed by: NURSE PRACTITIONER

## 2020-10-13 RX ORDER — DICYCLOMINE HYDROCHLORIDE 10 MG/1
10 CAPSULE ORAL
Qty: 40 CAPSULE | Refills: 0 | Status: SHIPPED | OUTPATIENT
Start: 2020-10-13 | End: 2021-05-07

## 2020-10-13 NOTE — PATIENT INSTRUCTIONS
"BMI for Adults  What is BMI?  Body mass index (BMI) is a number that is calculated from a person's weight and height. BMI can help estimate how much of a person's weight is composed of fat. BMI does not measure body fat directly. Rather, it is an alternative to procedures that directly measure body fat, which can be difficult and expensive.  BMI can help identify people who may be at higher risk for certain medical problems.  What are BMI measurements used for?  BMI is used as a screening tool to identify possible weight problems. It helps determine whether a person is obese, overweight, a healthy weight, or underweight.  BMI is useful for:  · Identifying a weight problem that may be related to a medical condition or may increase the risk for medical problems.  · Promoting changes, such as changes in diet and exercise, to help reach a healthy weight. BMI screening can be repeated to see if these changes are working.  How is BMI calculated?  BMI involves measuring your weight in relation to your height. Both height and weight are measured, and the BMI is calculated from those numbers. This can be done either in English ([place] or metric measurements. Note that charts and online BMI calculators are available to help you find your BMI quickly and easily without having to do these calculations yourself.  To calculate your BMI in English ([place] measurements:    1. Measure your weight in pounds (lb).  2. Multiply the number of pounds by 703.  ? For example, for a person who weighs 180 lb, multiply that number by 703, which equals 126,540.  3. Measure your height in inches. Then multiply that number by itself to get a measurement called \"inches squared.\"  ? For example, for a person who is 70 inches tall, the \"inches squared\" measurement is 70 inches x 70 inches, which equals 4,900 inches squared.  4. Divide the total from step 2 (number of lb x 703) by the total from step 3 (inches squared): 126,540 ÷ 4,900 = 25.8. This is " "your BMI.  To calculate your BMI in metric measurements:  1. Measure your weight in kilograms (kg).  2. Measure your height in meters (m). Then multiply that number by itself to get a measurement called \"meters squared.\"  ? For example, for a person who is 1.75 m tall, the \"meters squared\" measurement is 1.75 m x 1.75 m, which is equal to 3.1 meters squared.  3. Divide the number of kilograms (your weight) by the meters squared number. In this example: 70 ÷ 3.1 = 22.6. This is your BMI.  What do the results mean?  BMI charts are used to identify whether you are underweight, normal weight, overweight, or obese. The following guidelines will be used:  · Underweight: BMI less than 18.5.  · Normal weight: BMI between 18.5 and 24.9.  · Overweight: BMI between 25 and 29.9.  · Obese: BMI of 30 or above.  Keep these notes in mind:  · Weight includes both fat and muscle, so someone with a muscular build, such as an athlete, may have a BMI that is higher than 24.9. In cases like these, BMI is not an accurate measure of body fat.  · To determine if excess body fat is the cause of a BMI of 25 or higher, further assessments may need to be done by a health care provider.  · BMI is usually interpreted in the same way for men and women.  Where to find more information  For more information about BMI, including tools to quickly calculate your BMI, go to these websites:  · Centers for Disease Control and Prevention: www.cdc.gov  · American Heart Association: www.heart.org  · National Heart, Lung, and Blood [place]: www.nhlbi.nih.gov  Summary  · Body mass index (BMI) is a number that is calculated from a person's weight and height.  · BMI may help estimate how much of a person's weight is composed of fat. BMI can help identify those who may be at higher risk for certain medical problems.  · BMI can be measured using English measurements or metric measurements.  · BMI charts are used to identify whether you are underweight, normal " weight, overweight, or obese.  This information is not intended to replace advice given to you by your health care provider. Make sure you discuss any questions you have with your health care provider.  Document Released: 08/29/2005 Document Revised: 09/09/2020 Document Reviewed: 07/17/2020  Elsevier Patient Education © 2020 [employer]

## 2020-10-13 NOTE — PROGRESS NOTES
Subjective   Marleny Conn is a 25 y.o. female. Abdominal pain    History of Present Illness   Abdominal Pain  Patient complains of abdominal pain. The pain is described as cramping, and is 10/10 in intensity. The patient is experiencing generalized pain without radiation. Onset was a few hours ago. Symptoms have been unchanged. Aggravating factors: none.  Alleviating factors: none. Associated symptoms: none. The patient denies anorexia, arthralagias, belching, chills, constipation, diarrhea, dysuria, fever, flatus, frequency, headache, hematochezia, hematuria, melena, myalgias, nausea, sweats and vomiting. Pt showing no sign of distress due to pain.     The following portions of the patient's history were reviewed and updated as appropriate: allergies, current medications, past family history, past medical history, past social history, past surgical history and problem list.    Review of Systems   Constitutional: Negative for chills, fatigue and fever.   HENT: Negative for congestion, ear pain, rhinorrhea and sore throat.    Eyes: Negative for blurred vision, double vision and visual disturbance.   Respiratory: Negative for cough, chest tightness, shortness of breath and wheezing.    Cardiovascular: Negative for chest pain, palpitations and leg swelling.   Gastrointestinal: Positive for abdominal pain. Negative for diarrhea, nausea and vomiting.   Endocrine: Negative for cold intolerance and heat intolerance.   Genitourinary: Negative for difficulty urinating, dysuria, frequency and hematuria.   Musculoskeletal: Negative for arthralgias, back pain, neck pain and neck stiffness.   Skin: Negative for dry skin, pallor, rash, skin lesions and bruise.   Allergic/Immunologic: Negative for environmental allergies, food allergies and immunocompromised state.   Neurological: Negative for dizziness, syncope, weakness, light-headedness, headache and confusion.   Hematological: Negative for adenopathy. Does not  bruise/bleed easily.   Psychiatric/Behavioral: Negative for self-injury, sleep disturbance, suicidal ideas, depressed mood and stress. The patient is not nervous/anxious.        Objective   Physical Exam  Constitutional:       Appearance: She is well-developed.   HENT:      Head: Normocephalic.      Right Ear: External ear normal.      Left Ear: External ear normal.      Nose: Nose normal.      Mouth/Throat:      Pharynx: No oropharyngeal exudate.   Eyes:      Conjunctiva/sclera: Conjunctivae normal.      Pupils: Pupils are equal, round, and reactive to light.   Neck:      Musculoskeletal: Normal range of motion and neck supple.      Thyroid: No thyromegaly.   Cardiovascular:      Rate and Rhythm: Normal rate and regular rhythm.   Pulmonary:      Effort: Pulmonary effort is normal.      Breath sounds: Normal breath sounds.   Abdominal:      General: Abdomen is flat. Bowel sounds are normal.      Palpations: Abdomen is soft.      Tenderness: There is generalized abdominal tenderness. There is no right CVA tenderness, left CVA tenderness, guarding or rebound. Negative signs include Shelton's sign, Rovsing's sign, McBurney's sign, psoas sign and obturator sign.      Hernia: No hernia is present.   Musculoskeletal: Normal range of motion.   Skin:     General: Skin is warm and dry.   Neurological:      Mental Status: She is alert and oriented to person, place, and time.      Coordination: Coordination is intact.      Gait: Gait is intact.   Psychiatric:         Attention and Perception: Attention normal.         Mood and Affect: Mood normal.         Speech: Speech normal.         Behavior: Behavior normal.         Thought Content: Thought content normal.         Cognition and Memory: Cognition normal.         Judgment: Judgment normal.       Vitals:    10/13/20 1151   BP: 110/60   Pulse: 93   Temp: 98.7 °F (37.1 °C)   SpO2: 98%       Assessment/Plan   Diagnoses and all orders for this visit:    1. Abdominal cramping  (Primary)  -     dicyclomine (BENTYL) 10 MG capsule; Take 1 capsule by mouth 4 (Four) Times a Day Before Meals & at Bedtime.  Dispense: 40 capsule; Refill: 0    2. Diarrhea, unspecified type    diarrhea started today. Diarrhea could be infectious, could be something she ate. I want to give her more time to see if problem improves. If diarrhea does not improve I will do stool studies to look for cause. Pt instructed to take bentyl 10 mg with meals and at bedtime, warned it can cause constipation.  If symptoms do not improve or worsen, patient was instructed to return to clinic for further evaluation.         This document has been electronically signed by PETTY Gastelum on  October 14, 2020 08:29 CDT

## 2021-04-09 ENCOUNTER — LAB (OUTPATIENT)
Dept: LAB | Facility: HOSPITAL | Age: 26
End: 2021-04-09

## 2021-04-09 ENCOUNTER — OFFICE VISIT (OUTPATIENT)
Dept: FAMILY MEDICINE CLINIC | Facility: CLINIC | Age: 26
End: 2021-04-09

## 2021-04-09 ENCOUNTER — TELEPHONE (OUTPATIENT)
Dept: FAMILY MEDICINE CLINIC | Facility: CLINIC | Age: 26
End: 2021-04-09

## 2021-04-09 VITALS
HEIGHT: 64 IN | OXYGEN SATURATION: 99 % | BODY MASS INDEX: 45.82 KG/M2 | WEIGHT: 268.4 LBS | DIASTOLIC BLOOD PRESSURE: 80 MMHG | HEART RATE: 89 BPM | SYSTOLIC BLOOD PRESSURE: 116 MMHG

## 2021-04-09 DIAGNOSIS — K21.9 GASTROESOPHAGEAL REFLUX DISEASE, UNSPECIFIED WHETHER ESOPHAGITIS PRESENT: ICD-10-CM

## 2021-04-09 DIAGNOSIS — M54.50 CHRONIC RIGHT-SIDED LOW BACK PAIN, UNSPECIFIED WHETHER SCIATICA PRESENT: ICD-10-CM

## 2021-04-09 DIAGNOSIS — G89.29 CHRONIC RIGHT HIP PAIN: ICD-10-CM

## 2021-04-09 DIAGNOSIS — R20.0 NUMBNESS OF FACE: Primary | ICD-10-CM

## 2021-04-09 DIAGNOSIS — R20.0 NUMBNESS OF FACE: ICD-10-CM

## 2021-04-09 DIAGNOSIS — F41.9 ANXIETY: ICD-10-CM

## 2021-04-09 DIAGNOSIS — F33.1 MODERATE EPISODE OF RECURRENT MAJOR DEPRESSIVE DISORDER (HCC): ICD-10-CM

## 2021-04-09 DIAGNOSIS — M25.551 CHRONIC RIGHT HIP PAIN: ICD-10-CM

## 2021-04-09 DIAGNOSIS — G89.29 CHRONIC RIGHT-SIDED LOW BACK PAIN, UNSPECIFIED WHETHER SCIATICA PRESENT: ICD-10-CM

## 2021-04-09 DIAGNOSIS — Z76.89 ENCOUNTER TO ESTABLISH CARE: ICD-10-CM

## 2021-04-09 LAB
25(OH)D3 SERPL-MCNC: 23.6 NG/ML
ALBUMIN SERPL-MCNC: 4.3 G/DL (ref 3.5–5.2)
ALBUMIN/GLOB SERPL: 1.6 G/DL
ALP SERPL-CCNC: 82 U/L (ref 39–117)
ALT SERPL W P-5'-P-CCNC: 29 U/L (ref 1–33)
ANION GAP SERPL CALCULATED.3IONS-SCNC: 10.6 MMOL/L (ref 5–15)
AST SERPL-CCNC: 22 U/L (ref 1–32)
BASOPHILS # BLD AUTO: 0.03 10*3/MM3 (ref 0–0.2)
BASOPHILS NFR BLD AUTO: 0.3 % (ref 0–1.5)
BILIRUB SERPL-MCNC: 0.4 MG/DL (ref 0–1.2)
BUN SERPL-MCNC: 12 MG/DL (ref 6–20)
BUN/CREAT SERPL: 16.4 (ref 7–25)
CALCIUM SPEC-SCNC: 9.4 MG/DL (ref 8.6–10.5)
CHLORIDE SERPL-SCNC: 105 MMOL/L (ref 98–107)
CHOLEST SERPL-MCNC: 162 MG/DL (ref 0–200)
CO2 SERPL-SCNC: 23.4 MMOL/L (ref 22–29)
CREAT SERPL-MCNC: 0.73 MG/DL (ref 0.57–1)
DEPRECATED RDW RBC AUTO: 39.9 FL (ref 37–54)
EOSINOPHIL # BLD AUTO: 0.33 10*3/MM3 (ref 0–0.4)
EOSINOPHIL NFR BLD AUTO: 3.3 % (ref 0.3–6.2)
ERYTHROCYTE [DISTWIDTH] IN BLOOD BY AUTOMATED COUNT: 12.2 % (ref 12.3–15.4)
GFR SERPL CREATININE-BSD FRML MDRD: 96 ML/MIN/1.73
GLOBULIN UR ELPH-MCNC: 2.7 GM/DL
GLUCOSE SERPL-MCNC: 82 MG/DL (ref 65–99)
HBA1C MFR BLD: 5.09 % (ref 4.8–5.6)
HCT VFR BLD AUTO: 42.6 % (ref 34–46.6)
HDLC SERPL-MCNC: 53 MG/DL (ref 40–60)
HGB BLD-MCNC: 14.1 G/DL (ref 12–15.9)
IMM GRANULOCYTES # BLD AUTO: 0.04 10*3/MM3 (ref 0–0.05)
IMM GRANULOCYTES NFR BLD AUTO: 0.4 % (ref 0–0.5)
LDLC SERPL CALC-MCNC: 95 MG/DL (ref 0–100)
LDLC/HDLC SERPL: 1.77 {RATIO}
LYMPHOCYTES # BLD AUTO: 3.43 10*3/MM3 (ref 0.7–3.1)
LYMPHOCYTES NFR BLD AUTO: 33.8 % (ref 19.6–45.3)
MCH RBC QN AUTO: 30 PG (ref 26.6–33)
MCHC RBC AUTO-ENTMCNC: 33.1 G/DL (ref 31.5–35.7)
MCV RBC AUTO: 90.6 FL (ref 79–97)
MONOCYTES # BLD AUTO: 0.48 10*3/MM3 (ref 0.1–0.9)
MONOCYTES NFR BLD AUTO: 4.7 % (ref 5–12)
NEUTROPHILS NFR BLD AUTO: 5.84 10*3/MM3 (ref 1.7–7)
NEUTROPHILS NFR BLD AUTO: 57.5 % (ref 42.7–76)
NRBC BLD AUTO-RTO: 0 /100 WBC (ref 0–0.2)
PLATELET # BLD AUTO: 285 10*3/MM3 (ref 140–450)
PMV BLD AUTO: 11.2 FL (ref 6–12)
POTASSIUM SERPL-SCNC: 4.4 MMOL/L (ref 3.5–5.2)
PROT SERPL-MCNC: 7 G/DL (ref 6–8.5)
RBC # BLD AUTO: 4.7 10*6/MM3 (ref 3.77–5.28)
SODIUM SERPL-SCNC: 139 MMOL/L (ref 136–145)
TRIGL SERPL-MCNC: 75 MG/DL (ref 0–150)
TSH SERPL DL<=0.05 MIU/L-ACNC: 1.42 UIU/ML (ref 0.27–4.2)
VIT B12 BLD-MCNC: 309 PG/ML (ref 211–946)
VLDLC SERPL-MCNC: 14 MG/DL (ref 5–40)
WBC # BLD AUTO: 10.15 10*3/MM3 (ref 3.4–10.8)

## 2021-04-09 PROCEDURE — 99214 OFFICE O/P EST MOD 30 MIN: CPT | Performed by: FAMILY MEDICINE

## 2021-04-09 PROCEDURE — 80050 GENERAL HEALTH PANEL: CPT

## 2021-04-09 PROCEDURE — 82306 VITAMIN D 25 HYDROXY: CPT

## 2021-04-09 PROCEDURE — 83036 HEMOGLOBIN GLYCOSYLATED A1C: CPT

## 2021-04-09 PROCEDURE — 80061 LIPID PANEL: CPT

## 2021-04-09 PROCEDURE — 86038 ANTINUCLEAR ANTIBODIES: CPT

## 2021-04-09 PROCEDURE — 82607 VITAMIN B-12: CPT

## 2021-04-09 PROCEDURE — 36415 COLL VENOUS BLD VENIPUNCTURE: CPT

## 2021-04-09 RX ORDER — ERGOCALCIFEROL 1.25 MG/1
50000 CAPSULE ORAL WEEKLY
Qty: 4 CAPSULE | Refills: 1 | Status: SHIPPED | OUTPATIENT
Start: 2021-04-09 | End: 2021-08-11

## 2021-04-09 RX ORDER — OMEPRAZOLE 20 MG/1
20 CAPSULE, DELAYED RELEASE ORAL DAILY
Qty: 30 CAPSULE | Refills: 5 | Status: SHIPPED | OUTPATIENT
Start: 2021-04-09 | End: 2022-07-26

## 2021-04-09 RX ORDER — BUPROPION HYDROCHLORIDE 150 MG/1
150 TABLET ORAL EVERY MORNING
Qty: 30 TABLET | Refills: 5 | Status: SHIPPED | OUTPATIENT
Start: 2021-04-09 | End: 2022-01-06 | Stop reason: SDUPTHER

## 2021-04-09 NOTE — PROGRESS NOTES
"Chief Complaint  Establish Care, Anxiety, and Numbness (left side of face, x 2 months)    Subjective          Marleny Conn presents to [facility]  History of Present Illness    Patient presents today to establish care.  She has concerns for numbness on the left side of her face.  This has been present for the last year.  She says that when it initially started, she went to the emergency department.  She was diagnosed with a type of migraine.  She was then seen by PCP, and diagnosis was the same.  Patient says that the numbness never went away.  It is distressing to her she cannot feel her left eye or the inside of her mouth.  Patient was on Topamax previously.  She felt like it helped at first, but then it was not working.  She is weaned herself off of this medication.  She did not ever have consultation with neurology for the facial numbness.    Patient does have depression and anxiety symptoms.  Notes that her depression seems to be okay, but anxiety has been \"overwhelming\".  Patient is on Wellbutrin previously, and felt like this helped with both her depression and anxiety.  She is not sure why she was taken off of this medication by previous PCP.  She would like to be back on the Wellbutrin as she noticed significant improvement.    Patient has chronic pain in her low back and right hip.  Notes that this is been present for about a year as well.  She has done 2 rounds of physical therapy for her low back and pelvis.  Last physical therapy visits were around May 2020.  Patient says that nothing is helping, and she constantly has pain in this area.  Patient uses heat and over-the-counter analgesics.  She tries to stay active.    Objective   Vital Signs:   /80   Pulse 89   Ht 162.6 cm (64\")   Wt 122 kg (268 lb 6.4 oz)   SpO2 99%   BMI 46.07 kg/m²     Physical Exam  Vitals reviewed.   Constitutional:       General: She is not in acute distress.     Appearance: She is " well-developed.   HENT:      Head: Normocephalic and atraumatic.   Cardiovascular:      Rate and Rhythm: Normal rate and regular rhythm.      Heart sounds: Normal heart sounds. No murmur heard.     Pulmonary:      Effort: Pulmonary effort is normal. No respiratory distress.      Breath sounds: Normal breath sounds. No wheezing or rales.   Abdominal:      Palpations: Abdomen is soft.      Tenderness: There is no abdominal tenderness.   Musculoskeletal:      Cervical back: Neck supple.      Lumbar back: Tenderness (Around midline and right SI joint) present. Decreased range of motion. Negative right straight leg raise test and negative left straight leg raise test.      Right hip: Tenderness present. Normal strength.   Skin:     General: Skin is warm and dry.      Findings: No rash.   Neurological:      Mental Status: She is alert and oriented to person, place, and time.      Cranial Nerves: Cranial nerves are intact.      Sensory: Sensory deficit (Diminished sensation to light touch on the left side of her face in all sensory nerve distributions) present.      Motor: Motor function is intact. No weakness.      Coordination: Finger-Nose-Finger Test normal.      Gait: Gait is intact.        Result Review :   The following data was reviewed by: Kayleen Castro MD on 04/09/2021:    CMP from 1/2/2020 reviewed  CMP and CBC from 8/28/2019 reviewed  TSH from 2/12/2019 reviewed    CT of the head report from 3/5/2020 reviewed  X-ray of lumbar spine and right hip reports from outside facility done 2/7/2019 reviewed          PHQ-2/PHQ-9 Depression Screening 4/9/2021   Little interest or pleasure in doing things 2   Feeling down, depressed, or hopeless 0   Trouble falling or staying asleep, or sleeping too much 3   Feeling tired or having little energy 3   Poor appetite or overeating 2   Feeling bad about yourself - or that you are a failure or have let yourself or your family down 1   Trouble concentrating on things, such as  reading the newspaper or watching television 0   Moving or speaking so slowly that other people could have noticed. Or the opposite - being so fidgety or restless that you have been moving around a lot more than usual 0   Thoughts that you would be better off dead, or of hurting yourself in some way 0   Total Score 11   If you checked off any problems, how difficult have these problems made it for you to do your work, take care of things at home, or get along with other people? Not difficult at all         Anxiety ZOEY-7 4/9/2021   Feeling nervous, anxious or on edge 3   Not being able to stop or control worrying 3   Worrying too much about different things 3   Trouble Relaxing 3   Being so restless that it is hard to sit still 0   Becoming easily annoyed or irritable 3   Feeling afraid as if something awful might happen 0   ZOEY 7 Total Score 15   If you checked any problems, how difficult have these problems made it for you to do your work, take care of things at home, or get along with other people Somewhat difficult           Assessment and Plan    Diagnoses and all orders for this visit:    1. Numbness of face (Primary)  -     MRI Brain Without Contrast; Future  -     Ambulatory Referral to Neurology  -     Lipid Panel; Future  -     CBC & Differential; Future  -     Comprehensive Metabolic Panel; Future  -     TSH; Future  -     Vitamin B12; Future  -     Vitamin D 25 Hydroxy; Future  -     SOWMYA by IFA, Reflex 9-biomarkers profile; Future  -     Hemoglobin A1c; Future    2. Anxiety  -     buPROPion XL (Wellbutrin XL) 150 MG 24 hr tablet; Take 1 tablet by mouth Every Morning.  Dispense: 30 tablet; Refill: 5    3. Moderate episode of recurrent major depressive disorder (CMS/HCC)  -     buPROPion XL (Wellbutrin XL) 150 MG 24 hr tablet; Take 1 tablet by mouth Every Morning.  Dispense: 30 tablet; Refill: 5  -     Lipid Panel; Future  -     CBC & Differential; Future  -     Comprehensive Metabolic Panel; Future  -      TSH; Future  -     Vitamin B12; Future  -     Vitamin D 25 Hydroxy; Future  -     SOWMYA by IFA, Reflex 9-biomarkers profile; Future  -     Hemoglobin A1c; Future    4. Gastroesophageal reflux disease, unspecified whether esophagitis present  -     omeprazole (PrilOSEC) 20 MG capsule; Take 1 capsule by mouth Daily.  Dispense: 30 capsule; Refill: 5    5. Chronic right hip pain  -     MRI Hip Right Without Contrast; Future    6. Chronic right-sided low back pain, unspecified whether sciatica present  -     MRI Lumbar Spine Without Contrast; Future    7. Encounter to establish care      Patient presents today for sensory deficit on the left side of her face.  This is been present for about a year.  Patient needs evaluation with MRI of the brain.  If complex migraine, would expect the symptoms to be more intermittent.  We will also refer patient to neurology for further evaluation and management.  Will check labs including CBC, CMP, lipid panel, TSH, vitamin B12, vitamin D, SOWMYA and hemoglobin A1c.  Patient having anxiety and depression symptoms, previously treated with Wellbutrin.  We will restart this medication today.  Patient has tolerated in the past.  Patient following with gastroenterology for GERD symptoms, refill of Prilosec provided today.  Patient has chronic right hip pain and chronic right sided low back pain.  She has tried/failed conservative management with over-the-counter analgesics, topical treatments, heat and physical therapy.  Will get MRI for further evaluation of chronic right hip and right sided lumbar spine pain.  Patient may need future referral to orthopedic surgery and/or pain management for further evaluation and care.          Follow Up   Return in about 4 weeks (around 5/7/2021) for Recheck.  Patient was given instructions and counseling regarding her condition or for health maintenance advice. Please see specific information pulled into the AVS if appropriate.           This document has  been electronically signed by Kayleen Castro MD

## 2021-04-10 NOTE — TELEPHONE ENCOUNTER
Please call and let patient know the following labs:    - Vitamin B12 normal  - CMP normal  - CBC ok  - Cholesterol normal  - TSH normal  - HgbA1c normal  - Vitamin D low.  Will provide supplement, 50,000 units weekly for 8 weeks.      SOWMYA for autoimmune disease still pending.  Will let her know once this result returns.        ThanksDENNIS

## 2021-04-12 NOTE — TELEPHONE ENCOUNTER
Per Dr. Castro, Ms Conn has been called with recent lab results and recommendations.  Please continue your current medications and follow-up as planned or sooner if any problems.

## 2021-04-13 ENCOUNTER — TELEPHONE (OUTPATIENT)
Dept: FAMILY MEDICINE CLINIC | Facility: CLINIC | Age: 26
End: 2021-04-13

## 2021-04-13 LAB
ANA TITR SER IF: NEGATIVE {TITER}
LABORATORY COMMENT REPORT: NORMAL

## 2021-04-13 NOTE — TELEPHONE ENCOUNTER
Please let patient know the SOWMYA, screening for autoimmune diseases, was negative.  Thanks, Maya

## 2021-04-13 NOTE — TELEPHONE ENCOUNTER
Per Dr Castro, Ms. currie has been called with recent SOWMYA lab results & recommendations.  Continue current medications and follow-up as planned or sooner if any problems.

## 2021-04-20 ENCOUNTER — TELEPHONE (OUTPATIENT)
Dept: FAMILY MEDICINE CLINIC | Facility: CLINIC | Age: 26
End: 2021-04-20

## 2021-04-20 NOTE — TELEPHONE ENCOUNTER
Jacquelyn from [facility] called and wanted to know if Dr Castro was going to do a peer to peer, insurance will not approve MRI for lumbar and hip scheduled for tomorrow.  Jacquelyn asked if someone would call and let her know so she can notify the patient    Jacquelyn  [phone]

## 2021-04-21 ENCOUNTER — APPOINTMENT (OUTPATIENT)
Dept: MRI IMAGING | Facility: HOSPITAL | Age: 26
End: 2021-04-21

## 2021-04-21 ENCOUNTER — HOSPITAL ENCOUNTER (OUTPATIENT)
Dept: MRI IMAGING | Facility: HOSPITAL | Age: 26
End: 2021-04-21

## 2021-04-28 ENCOUNTER — TELEPHONE (OUTPATIENT)
Dept: FAMILY MEDICINE CLINIC | Facility: CLINIC | Age: 26
End: 2021-04-28

## 2021-04-28 DIAGNOSIS — M25.551 CHRONIC RIGHT HIP PAIN: Primary | ICD-10-CM

## 2021-04-28 DIAGNOSIS — G89.29 CHRONIC RIGHT-SIDED LOW BACK PAIN, UNSPECIFIED WHETHER SCIATICA PRESENT: ICD-10-CM

## 2021-04-28 DIAGNOSIS — M54.50 CHRONIC RIGHT-SIDED LOW BACK PAIN, UNSPECIFIED WHETHER SCIATICA PRESENT: ICD-10-CM

## 2021-04-28 DIAGNOSIS — G89.29 CHRONIC RIGHT HIP PAIN: Primary | ICD-10-CM

## 2021-04-28 NOTE — TELEPHONE ENCOUNTER
Please call and let patient know that insurance is not going to approve her MRI lumbar spine and hip unless she has a recent physical therapy trial (within the last 6 months).   Please ask patient is she prefers referral to physical therapy and/or referral to pain management.  ThanksDENNIS

## 2021-04-28 NOTE — TELEPHONE ENCOUNTER
Per Dr. Castro, Ms. Conn has been called with information about her MRI.   She aimee like to be referred to Pain management.     Thank you  SUSIE Shearer

## 2021-04-29 ENCOUNTER — TELEPHONE (OUTPATIENT)
Dept: FAMILY MEDICINE CLINIC | Facility: CLINIC | Age: 26
End: 2021-04-29

## 2021-04-29 DIAGNOSIS — G89.29 CHRONIC RIGHT HIP PAIN: Primary | ICD-10-CM

## 2021-04-29 DIAGNOSIS — M25.551 CHRONIC RIGHT HIP PAIN: Primary | ICD-10-CM

## 2021-04-29 DIAGNOSIS — M54.50 CHRONIC RIGHT-SIDED LOW BACK PAIN, UNSPECIFIED WHETHER SCIATICA PRESENT: ICD-10-CM

## 2021-04-29 DIAGNOSIS — G89.29 CHRONIC RIGHT-SIDED LOW BACK PAIN, UNSPECIFIED WHETHER SCIATICA PRESENT: ICD-10-CM

## 2021-04-29 NOTE — TELEPHONE ENCOUNTER
Pt called to let Dr. Castro know she would prefer to go to PT than pain mgmt.    Please contact pt and advise next steps.    [phone]

## 2021-04-30 ENCOUNTER — TELEPHONE (OUTPATIENT)
Dept: FAMILY MEDICINE CLINIC | Facility: CLINIC | Age: 26
End: 2021-04-30

## 2021-04-30 ENCOUNTER — APPOINTMENT (OUTPATIENT)
Dept: MRI IMAGING | Facility: HOSPITAL | Age: 26
End: 2021-04-30

## 2021-04-30 ENCOUNTER — HOSPITAL ENCOUNTER (OUTPATIENT)
Dept: MRI IMAGING | Facility: HOSPITAL | Age: 26
Discharge: HOME | End: 2021-04-30
Admitting: FAMILY MEDICINE

## 2021-04-30 DIAGNOSIS — R20.0 NUMBNESS OF FACE: ICD-10-CM

## 2021-04-30 PROCEDURE — 70551 MRI BRAIN STEM W/O DYE: CPT

## 2021-05-06 ENCOUNTER — APPOINTMENT (OUTPATIENT)
Dept: PHYSICAL THERAPY | Facility: HOSPITAL | Age: 26
End: 2021-05-06

## 2021-05-07 ENCOUNTER — OFFICE VISIT (OUTPATIENT)
Dept: FAMILY MEDICINE CLINIC | Facility: CLINIC | Age: 26
End: 2021-05-07

## 2021-05-07 VITALS
SYSTOLIC BLOOD PRESSURE: 121 MMHG | DIASTOLIC BLOOD PRESSURE: 70 MMHG | WEIGHT: 267 LBS | BODY MASS INDEX: 45.58 KG/M2 | OXYGEN SATURATION: 99 % | HEIGHT: 64 IN | HEART RATE: 88 BPM

## 2021-05-07 DIAGNOSIS — M25.551 CHRONIC RIGHT HIP PAIN: ICD-10-CM

## 2021-05-07 DIAGNOSIS — M54.50 CHRONIC RIGHT-SIDED LOW BACK PAIN, UNSPECIFIED WHETHER SCIATICA PRESENT: ICD-10-CM

## 2021-05-07 DIAGNOSIS — Z98.51 STATUS POST TUBAL LIGATION: ICD-10-CM

## 2021-05-07 DIAGNOSIS — R20.0 NUMBNESS OF FACE: Primary | ICD-10-CM

## 2021-05-07 DIAGNOSIS — Z32.02 NEGATIVE PREGNANCY TEST: ICD-10-CM

## 2021-05-07 DIAGNOSIS — G89.29 CHRONIC RIGHT-SIDED LOW BACK PAIN, UNSPECIFIED WHETHER SCIATICA PRESENT: ICD-10-CM

## 2021-05-07 DIAGNOSIS — G89.29 CHRONIC RIGHT HIP PAIN: ICD-10-CM

## 2021-05-07 DIAGNOSIS — E53.8 VITAMIN B12 DEFICIENCY: ICD-10-CM

## 2021-05-07 LAB
B-HCG UR QL: NEGATIVE
INTERNAL NEGATIVE CONTROL: NORMAL
INTERNAL POSITIVE CONTROL: NORMAL
Lab: NORMAL

## 2021-05-07 PROCEDURE — 81025 URINE PREGNANCY TEST: CPT | Performed by: FAMILY MEDICINE

## 2021-05-07 PROCEDURE — 99214 OFFICE O/P EST MOD 30 MIN: CPT | Performed by: FAMILY MEDICINE

## 2021-05-07 RX ORDER — LANOLIN ALCOHOL/MO/W.PET/CERES
1000 CREAM (GRAM) TOPICAL DAILY
Qty: 60 TABLET | Refills: 0 | Status: SHIPPED | OUTPATIENT
Start: 2021-05-07 | End: 2021-07-13

## 2021-05-07 NOTE — TELEPHONE ENCOUNTER
Per Dr. Patel,  has been called with recent MRI of the Brain results and recommendations.  Continue current medications and follow-up as planned or sooner if any problems.

## 2021-05-10 ENCOUNTER — APPOINTMENT (OUTPATIENT)
Dept: MRI IMAGING | Facility: HOSPITAL | Age: 26
End: 2021-05-10

## 2021-05-12 ENCOUNTER — APPOINTMENT (OUTPATIENT)
Dept: PHYSICAL THERAPY | Facility: HOSPITAL | Age: 26
End: 2021-05-12

## 2021-05-21 ENCOUNTER — HOSPITAL ENCOUNTER (OUTPATIENT)
Dept: PHYSICAL THERAPY | Facility: HOSPITAL | Age: 26
Setting detail: THERAPIES SERIES
Discharge: HOME | End: 2021-05-21

## 2021-05-21 DIAGNOSIS — G89.29 CHRONIC RIGHT-SIDED LOW BACK PAIN, UNSPECIFIED WHETHER SCIATICA PRESENT: ICD-10-CM

## 2021-05-21 DIAGNOSIS — G89.29 CHRONIC RIGHT HIP PAIN: Primary | ICD-10-CM

## 2021-05-21 DIAGNOSIS — M54.50 CHRONIC RIGHT-SIDED LOW BACK PAIN, UNSPECIFIED WHETHER SCIATICA PRESENT: ICD-10-CM

## 2021-05-21 DIAGNOSIS — M25.551 CHRONIC RIGHT HIP PAIN: Primary | ICD-10-CM

## 2021-05-21 PROCEDURE — 97162 PT EVAL MOD COMPLEX 30 MIN: CPT

## 2021-05-25 ENCOUNTER — APPOINTMENT (OUTPATIENT)
Dept: PHYSICAL THERAPY | Facility: HOSPITAL | Age: 26
End: 2021-05-25

## 2021-05-28 ENCOUNTER — TELEPHONE (OUTPATIENT)
Dept: PHYSICAL THERAPY | Facility: HOSPITAL | Age: 26
End: 2021-05-28

## 2021-06-02 ENCOUNTER — APPOINTMENT (OUTPATIENT)
Dept: PHYSICAL THERAPY | Facility: HOSPITAL | Age: 26
End: 2021-06-02

## 2021-06-03 ENCOUNTER — TELEPHONE (OUTPATIENT)
Dept: FAMILY MEDICINE CLINIC | Facility: CLINIC | Age: 26
End: 2021-06-03

## 2021-06-04 ENCOUNTER — HOSPITAL ENCOUNTER (OUTPATIENT)
Dept: PHYSICAL THERAPY | Facility: HOSPITAL | Age: 26
Setting detail: THERAPIES SERIES
Discharge: HOME | End: 2021-06-04

## 2021-06-04 ENCOUNTER — TELEPHONE (OUTPATIENT)
Dept: FAMILY MEDICINE CLINIC | Facility: CLINIC | Age: 26
End: 2021-06-04

## 2021-06-04 DIAGNOSIS — M54.50 CHRONIC RIGHT-SIDED LOW BACK PAIN, UNSPECIFIED WHETHER SCIATICA PRESENT: ICD-10-CM

## 2021-06-04 DIAGNOSIS — G89.29 CHRONIC RIGHT HIP PAIN: Primary | ICD-10-CM

## 2021-06-04 DIAGNOSIS — M43.16 SPONDYLOLISTHESIS OF LUMBAR REGION: ICD-10-CM

## 2021-06-04 DIAGNOSIS — G89.29 CHRONIC RIGHT-SIDED LOW BACK PAIN, UNSPECIFIED WHETHER SCIATICA PRESENT: ICD-10-CM

## 2021-06-04 DIAGNOSIS — M53.3 SI (SACROILIAC) JOINT DYSFUNCTION: ICD-10-CM

## 2021-06-04 DIAGNOSIS — M25.551 CHRONIC RIGHT HIP PAIN: Primary | ICD-10-CM

## 2021-06-04 PROCEDURE — 97110 THERAPEUTIC EXERCISES: CPT

## 2021-06-04 PROCEDURE — 97012 MECHANICAL TRACTION THERAPY: CPT

## 2021-06-04 PROCEDURE — 97140 MANUAL THERAPY 1/> REGIONS: CPT

## 2021-06-04 PROCEDURE — 97010 HOT OR COLD PACKS THERAPY: CPT

## 2021-06-04 NOTE — TELEPHONE ENCOUNTER
Per Dr. Castro, Mr. Guo's family Ms. Barragan has been called with  Recommendations.   Continue current medications and follow-up as planned or sooner if any problems.     She will think about it and call us back

## 2021-06-04 NOTE — TELEPHONE ENCOUNTER
Please call to let patient know that tubal ligation procedure done was not reversible.  If she would like to conceive, she would need to go through IVF.  When she is ready/decides that she would like to have a consultation to discuss options/pricing with this provider, please let me know and I will place referral to [facility] at [facility] in [place].  Thanks, DENNIS Castro

## 2021-06-04 NOTE — THERAPY TREATMENT NOTE
Outpatient Physical Therapy Ortho Treatment Note  [facility]     Patient Name: Marleny Conn  : 1995  MRN: [id number]  Today's Date: 2021      Visit Date: 2021  Attendance: 2/2 (6 visits approved until 2021)  Subjective Improvement: n/a  Next MD Visit: TBD  Recert Date: 2021     Therapy Diagnosis: Chronic right hip pain; Chronic low back pain       Visit Dx:    ICD-10-CM ICD-9-CM   1. Chronic right hip pain  M25.551 719.45    G89.29 338.29   2. Chronic right-sided low back pain, unspecified whether sciatica present  M54.5 724.2    G89.29 338.29   3. SI (sacroiliac) joint dysfunction  M53.3 724.6   4. Spondylolisthesis of lumbar region  M43.16 738.4       Patient Active Problem List   Diagnosis   • Rh negative state in antepartum period   • Rubella non-immune status, antepartum   • Back pain affecting pregnancy in third trimester   • Term pregnancy   • Sterilization consult   • Morbidly obese (CMS/HCC)   • Epigastric pain   • Nausea   • Change in bowel habits   • Diarrhea   • Generalized abdominal pain        Past Medical History:   Diagnosis Date   • Acid reflux    • Allergic rhinitis    • Anxiety    • Anxiety    • Bronchitis     probable   • Common cold    • Conjunctivitis    • Dysfunction of eustachian tube    • Examination of eyes and vision     general; NORMAL   • Gastroenteritis    • Headache    • Hemorrhoids     likely   • Infection of skin and subcutaneous tissue    • Migraine    • Nonvenomous insect bite    • Open wound of lower limb    • Pharyngitis    • Rhinitis    • Tick bite    • URI (upper respiratory infection)         Past Surgical History:   Procedure Laterality Date   • COLONOSCOPY N/A 1/15/2020    Procedure: COLONOSCOPY;  Surgeon: Sterling Neal MD;  Location: [facility] ENDOSCOPY;  Service: Gastroenterology   • ENDOSCOPY N/A 2019    Procedure: ESOPHAGOGASTRODUODENOSCOPY;  Surgeon: Sterling Neal MD;  Location: [facility] ENDOSCOPY;  Service:  "Gastroenterology   • SALPINGECTOMY Bilateral 8/7/2018    Procedure: SALPINGECTOMY LAPAROSCOPIC;  Surgeon: Friday, Elizabeth SMITH MD;  Location: [facility];  Service: Obstetrics/Gynecology   • WISDOM TOOTH EXTRACTION         PT Ortho     Row Name 06/04/21 0800       Subjective Comments    Subjective Comments  Pt stated that she feels like there is a lot of pressure in her low back today. She stated that \"things are the same. It's really bad.\" She denies anything that might have changed or increased symptoms following her evaluation.   -       Subjective Pain    Able to rate subjective pain?  yes  -    Pre-Treatment Pain Level  7  -       Posture/Observations    Posture/Observations Comments  Right post rotated pelvis. TTP right hip joint and right PSIS.  -      User Key  (r) = Recorded By, (t) = Taken By, (c) = Cosigned By    Initials Name Provider Type     Wilda Valera, PT Physical Therapist                      PT Assessment/Plan     Row Name 06/04/21 0800          PT Assessment    Functional Limitations  Impaired locomotion;Limitation in home management;Limitations in community activities;Limitations in functional capacity and performance;Performance in leisure activities;Performance in self-care ADL;Performance in work activities  -     Impairments  Impaired flexibility;Impaired muscle endurance;Impaired muscle length;Impaired muscle power;Impaired postural alignment;Joint mobility;Muscle strength;Pain;Poor body mechanics;Posture;Range of motion  -     Assessment Comments  Marleny cont to arrive with elevated pain. Attempted pro II and hip add squeeze both of which increased pt's pain. Assessed pt's pelvic alignment which was right post rotated and minimally corrected with MET. Long axis distraction decreased pt's pain. Pt was then placed on mechanical traction. After 10 min, pt stated she has a sharp pain in her right hip. Initially she stated that traction was decreasing her pain until the sharp pain in " "hip started. MHP applied post session to help decrease pain.  -     Rehab Potential  [place]: Failed previous PT; chronicity   -     Patient/caregiver participated in establishment of treatment plan and goals  Yes  -     Patient would benefit from skilled therapy intervention  Yes  -        PT Plan    PT Frequency  2x/week  -     Predicted Duration of Therapy Intervention (PT)  4 weeks with more TBD  -     PT Plan Comments  Check alignment at start of session and correct if necessary. Attempt traction in supine (not hooklying), may start session with IFC and MHP in sitting.  Possible aquatic therapy if land pt does not help after 2 more visits.   -       User Key  (r) = Recorded By, (t) = Taken By, (c) = Cosigned By    Initials Name Provider Type     Wilda Valera, PT Physical Therapist          Modalities     Row Name 06/04/21 0800             Moist Heat     Applied  Yes  -      Location  Low back  -      PT Moist Heat Minutes  10  -MH      MH S/P Rx  Yes  -         Traction 99194    Traction Type  Lumbar  -      PT Traction Rx Minutes  10  -MH      Duration  Intermittent  -      Position  Hook-lying  -      Weight  -- 60/30  -MH      Progression  3  -      Regression  3  -        User Key  (r) = Recorded By, (t) = Taken By, (c) = Cosigned By    Initials Name Provider Type     Wilda Valera, PT Physical Therapist        OP Exercises     Row Name 06/04/21 0800             Subjective Comments    Subjective Comments  Pt stated that she feels like there is a lot of pressure in her low back today. She stated that \"things are the same. It's really bad.\" She denies anything that might have changed or increased symptoms following her evaluation.   -         Subjective Pain    Able to rate subjective pain?  yes  -      Pre-Treatment Pain Level  7  -      Post-Treatment Pain Level  4  -         Total Minutes    14890 - PT Therapeutic Exercise Minutes  15  -      83394 - PT " Manual Therapy Minutes  15  -         Exercise 1    Exercise Name 1  Pro II LE; ROM  -      Time 1  8 min  -      Additional Comments  lvl 1.0; seat 11  -         Exercise 2    Exercise Name 2  Pelvic alignment check  -         Exercise 3    Exercise Name 3  MET for right post rotated pelvis  -      Time 3  5 min  -      Additional Comments  Decreased pain, improved alignment  -         Exercise 4    Exercise Name 4  Long axis distraction  -      Time 4  5 min  -      Additional Comments  Minor decrease in pain  -         Exercise 5    Exercise Name 5  Seated hip add squeeze  -      Cueing 5  Verbal;Demo;Tactile  -      Sets 5  1  -      Reps 5  10  -         Exercise 6    Exercise Name 6  Mechanical lumbar traction  -      Additional Comments  See modalities  -        User Key  (r) = Recorded By, (t) = Taken By, (c) = Cosigned By    Initials Name Provider Type    Wilda Lloyd, PT Physical Therapist                      Manual Rx (last 36 hours)      Manual Treatments     Row Name 06/04/21 0800             Total Minutes    95239 - PT Manual Therapy Minutes  15  -        User Key  (r) = Recorded By, (t) = Taken By, (c) = Cosigned By    Initials Name Provider Type     Wilda Valera, PT Physical Therapist          PT OP Goals     Row Name 06/04/21 0800          PT Short Term Goals    STG Date to Achieve  06/11/21  -     STG 1  Pt will be independent with [facility] for self-management of symptoms.  -     STG 1 Progress  Ongoing  -     STG 2  Pt will be able to go thought available thoracolumbar AROM without increased pain.  -     STG 2 Progress  Ongoing  [facility]     STG 3  Pt will report being able to tolerate standing for at least 10 min without increasing back pain in order for her to complete IADLs.  -     STG 3 Progress  Ongoing  -        Long Term Goals    LTG Date to Achieve  06/18/21  -     LTG 1  Pt will report a subjective improvement of at least 60% in symptoms  as a measure to return to PLOF.  -     LTG 1 Progress  Ongoing  -     LTG 2  Pt's Modified Oswestry score will improve by at least 12%.  -     LTG 2 Progress  Ongoing  -     LTG 3  Pt's right hip MMT will improve to 5/5 grossly as a measure of improved strength.  -     LTG 3 Progress  Ongoing  -     LTG 4  Pt will arrive to therapy with correct pelvic alignment (no rotation).  -     LTG 4 Progress  Ongoing  -     LTG 5  Pt will report an average pain of 5/10 or less throughout the week.  -     LTG 5 Progress  Ongoing  -        Time Calculation    PT Goal Re-Cert Due Date  06/25/21  -       User Key  (r) = Recorded By, (t) = Taken By, (c) = Cosigned By    Initials Name Provider Type    Wilda Lloyd, SELAM Physical Therapist                         Time Calculation:   Start Time: 0800  Stop Time: 0853  Time Calculation (min): 53 min  Timed Charges  24766 - PT Therapeutic Exercise Minutes: 15  06633 - PT Manual Therapy Minutes: 15  Untimed Charges  PT Traction Rx Minutes: 10  PT Moist Heat Minutes: 10  Total Minutes  Timed Charges Total Minutes: 30  Untimed Charges Total Minutes: 20   Total Minutes: 50  Therapy Charges for Today     Code Description Service Date Service Provider Modifiers Qty    51397494471 HC PT THER PROC EA 15 MIN 6/4/2021 Wilda Valera, PT GP 1    35711333712 HC PT MANUAL THERAPY EA 15 MIN 6/4/2021 Wilda Valera, PT GP 1    04433412151 HC PT TRACTION LUMBAR 6/4/2021 Wilda Valera, PT GP 1    88904030183 HC PT HOT/COLD PACK WC NONMCARE 6/4/2021 Wilda Valera, PT GP 1                    Wilda Valera PT  6/4/2021

## 2021-06-11 ENCOUNTER — TELEPHONE (OUTPATIENT)
Dept: PHYSICAL THERAPY | Facility: HOSPITAL | Age: 26
End: 2021-06-11

## 2021-06-15 ENCOUNTER — APPOINTMENT (OUTPATIENT)
Dept: PHYSICAL THERAPY | Facility: HOSPITAL | Age: 26
End: 2021-06-15

## 2021-06-17 ENCOUNTER — APPOINTMENT (OUTPATIENT)
Dept: PHYSICAL THERAPY | Facility: HOSPITAL | Age: 26
End: 2021-06-17

## 2021-07-12 DIAGNOSIS — E53.8 VITAMIN B12 DEFICIENCY: ICD-10-CM

## 2021-07-13 RX ORDER — LANOLIN ALCOHOL/MO/W.PET/CERES
1000 CREAM (GRAM) TOPICAL DAILY
Qty: 90 TABLET | Refills: 1 | Status: SHIPPED | OUTPATIENT
Start: 2021-07-13

## 2021-08-11 RX ORDER — ERGOCALCIFEROL 1.25 MG/1
50000 CAPSULE ORAL WEEKLY
Qty: 4 CAPSULE | Refills: 1 | Status: SHIPPED | OUTPATIENT
Start: 2021-08-11 | End: 2021-10-14

## 2021-08-20 ENCOUNTER — OFFICE VISIT (OUTPATIENT)
Dept: FAMILY MEDICINE CLINIC | Facility: CLINIC | Age: 26
End: 2021-08-20

## 2021-08-20 VITALS
SYSTOLIC BLOOD PRESSURE: 121 MMHG | HEIGHT: 64 IN | OXYGEN SATURATION: 100 % | DIASTOLIC BLOOD PRESSURE: 84 MMHG | HEART RATE: 78 BPM | WEIGHT: 267 LBS | BODY MASS INDEX: 45.58 KG/M2

## 2021-08-20 DIAGNOSIS — R07.9 CHEST PAIN IN ADULT: ICD-10-CM

## 2021-08-20 DIAGNOSIS — F41.9 ANXIETY: Primary | ICD-10-CM

## 2021-08-20 DIAGNOSIS — F33.1 MODERATE EPISODE OF RECURRENT MAJOR DEPRESSIVE DISORDER (HCC): ICD-10-CM

## 2021-08-20 PROCEDURE — 99213 OFFICE O/P EST LOW 20 MIN: CPT | Performed by: FAMILY MEDICINE

## 2021-08-20 PROCEDURE — 93005 ELECTROCARDIOGRAM TRACING: CPT | Performed by: FAMILY MEDICINE

## 2021-08-20 PROCEDURE — 93010 ELECTROCARDIOGRAM REPORT: CPT | Performed by: INTERNAL MEDICINE

## 2021-08-20 RX ORDER — BUSPIRONE HYDROCHLORIDE 5 MG/1
5 TABLET ORAL 3 TIMES DAILY
Qty: 90 TABLET | Refills: 2 | Status: SHIPPED | OUTPATIENT
Start: 2021-08-20 | End: 2022-01-06 | Stop reason: SDUPTHER

## 2021-08-24 LAB
QT INTERVAL: 398 MS
QTC INTERVAL: 413 MS

## 2021-10-14 RX ORDER — ERGOCALCIFEROL 1.25 MG/1
50000 CAPSULE ORAL WEEKLY
Qty: 4 CAPSULE | Refills: 0 | Status: SHIPPED | OUTPATIENT
Start: 2021-10-14 | End: 2022-12-30

## 2022-01-06 ENCOUNTER — OFFICE VISIT (OUTPATIENT)
Dept: FAMILY MEDICINE CLINIC | Facility: CLINIC | Age: 27
End: 2022-01-06

## 2022-01-06 VITALS
HEART RATE: 78 BPM | HEIGHT: 64 IN | OXYGEN SATURATION: 100 % | SYSTOLIC BLOOD PRESSURE: 134 MMHG | BODY MASS INDEX: 46.95 KG/M2 | WEIGHT: 275 LBS | DIASTOLIC BLOOD PRESSURE: 84 MMHG

## 2022-01-06 DIAGNOSIS — F41.9 ANXIETY: ICD-10-CM

## 2022-01-06 DIAGNOSIS — F33.1 MODERATE EPISODE OF RECURRENT MAJOR DEPRESSIVE DISORDER: ICD-10-CM

## 2022-01-06 PROCEDURE — 99213 OFFICE O/P EST LOW 20 MIN: CPT | Performed by: FAMILY MEDICINE

## 2022-01-06 RX ORDER — BUPROPION HYDROCHLORIDE 300 MG/1
300 TABLET ORAL EVERY MORNING
Qty: 30 TABLET | Refills: 2 | Status: SHIPPED | OUTPATIENT
Start: 2022-01-06

## 2022-01-06 RX ORDER — CETIRIZINE HYDROCHLORIDE 10 MG/1
10 TABLET ORAL DAILY
COMMUNITY
Start: 2021-10-14

## 2022-01-06 RX ORDER — BUSPIRONE HYDROCHLORIDE 10 MG/1
10 TABLET ORAL 3 TIMES DAILY
Qty: 90 TABLET | Refills: 2 | Status: SHIPPED | OUTPATIENT
Start: 2022-01-06 | End: 2022-03-09
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
## 2022-03-09 ENCOUNTER — OFFICE VISIT (OUTPATIENT)
Dept: FAMILY MEDICINE CLINIC | Facility: CLINIC | Age: 27
End: 2022-03-09

## 2022-03-09 VITALS
BODY MASS INDEX: 47.46 KG/M2 | HEIGHT: 64 IN | HEART RATE: 92 BPM | DIASTOLIC BLOOD PRESSURE: 74 MMHG | SYSTOLIC BLOOD PRESSURE: 120 MMHG | WEIGHT: 278 LBS | OXYGEN SATURATION: 98 %

## 2022-03-09 DIAGNOSIS — E53.8 VITAMIN B12 DEFICIENCY: ICD-10-CM

## 2022-03-09 DIAGNOSIS — L67.8 ABNORMAL FACIAL HAIR: ICD-10-CM

## 2022-03-09 DIAGNOSIS — R00.2 PALPITATIONS: ICD-10-CM

## 2022-03-09 DIAGNOSIS — F33.1 MODERATE EPISODE OF RECURRENT MAJOR DEPRESSIVE DISORDER: ICD-10-CM

## 2022-03-09 DIAGNOSIS — F41.9 ANXIETY: Primary | ICD-10-CM

## 2022-03-09 PROCEDURE — 99214 OFFICE O/P EST MOD 30 MIN: CPT | Performed by: FAMILY MEDICINE

## 2022-03-09 RX ORDER — FLUOXETINE HYDROCHLORIDE 20 MG/1
20 CAPSULE ORAL DAILY
Qty: 30 CAPSULE | Refills: 5 | Status: SHIPPED | OUTPATIENT
Start: 2022-03-09 | End: 2022-04-22 | Stop reason: SDUPTHER

## 2022-03-09 RX ORDER — BUSPIRONE HYDROCHLORIDE 10 MG/1
10 TABLET ORAL 2 TIMES DAILY
Qty: 60 TABLET | Refills: 2
Start: 2022-03-09 | End: 2022-11-13

## 2022-03-09 NOTE — PROGRESS NOTES
" Chief Complaint  Anxiety (4 wk f/u )    Subjective          Marleny Conn presents to [facility] [place]  History of Present Illness    Patient seen today for follow-up anxiety and depression.  Not well controlled.  Did not notice much improvement with increase in Wellbutrin and BuSpar at last office visit.  She has the palpitations, not always associated with anxiety.  Patient also has concerns about abnormal facial hair growth.    Objective   Vital Signs:   /74   Pulse 92   Ht 162.6 cm (64\")   Wt 126 kg (278 lb)   SpO2 98%   BMI 47.72 kg/m²     Physical Exam  Vitals reviewed.   Constitutional:       General: She is not in acute distress.     Appearance: She is well-developed.   Cardiovascular:      Rate and Rhythm: Normal rate and regular rhythm.      Heart sounds: Normal heart sounds. No murmur heard.  Pulmonary:      Effort: Pulmonary effort is normal. No respiratory distress.      Breath sounds: Normal breath sounds. No wheezing or rales.   Abdominal:      Palpations: Abdomen is soft.      Tenderness: There is no abdominal tenderness.   Skin:     General: Skin is warm and dry.   Neurological:      Mental Status: She is alert and oriented to person, place, and time.        Result Review :   The following data was reviewed by: Kayleen Castro MD on 03/09/2022:  Common labs    Common Labsle 4/9/21 4/9/21 4/9/21 4/9/21    1140 1140 1140 1140   Glucose   82    BUN   12    Creatinine   0.73    eGFR Non African Am   96    Sodium   139    Potassium   4.4    Chloride   105    Calcium   9.4    Albumin   4.30    Total Bilirubin   0.4    Alkaline Phosphatase   82    AST (SGOT)   22    ALT (SGPT)   29    WBC 10.15      Hemoglobin 14.1      Hematocrit 42.6      Platelets 285      Total Cholesterol  162     Triglycerides  75     HDL Cholesterol  53     LDL Cholesterol   95     Hemoglobin A1C    5.09                  Assessment and Plan    Diagnoses and all " orders for this visit:    1. Anxiety (Primary)  -     FLUoxetine (PROzac) 20 MG capsule; Take 1 capsule by mouth Daily.  Dispense: 30 capsule; Refill: 5  -     busPIRone (BUSPAR) 10 MG tablet; Take 1 tablet by mouth 2 (Two) Times a Day.  Dispense: 60 tablet; Refill: 2    2. Moderate episode of recurrent major depressive disorder (HCC)  -     FLUoxetine (PROzac) 20 MG capsule; Take 1 capsule by mouth Daily.  Dispense: 30 capsule; Refill: 5  -     Lipid Panel; Future  -     CBC & Differential; Future  -     Comprehensive Metabolic Panel; Future    3. Vitamin B12 deficiency    4. Palpitations  -     Holter Monitor - 72 Hour Up To 15 Days; Future    5. Abnormal facial hair  -     Insulin, Free & Total, Serum; Future  -     Testosterone, Total, Women, Children, and Hypogonadal Males; Future      Depression and anxiety symptoms not well controlled  Add Prozac 20 mg daily  We will plan to decrease Wellbutrin XL back to 150 mg  Continue BuSpar  Check lipid panel, CBC and CMP  Good chance of palpitations and related to increased anxiety  However, palpitation symptoms worsening  Will have patient wear Holter monitor for additional evaluation  Last EKG from 8/24/2021.  Check insulin and testosterone level for hair growth          Follow Up   Return in about 1 month (around 4/11/2022) for Recheck.  Patient was given instructions and counseling regarding her condition or for health maintenance advice. Please see specific information pulled into the AVS if appropriate.         This document has been electronically signed by Kayleen Castro MD

## 2022-03-11 ENCOUNTER — LAB (OUTPATIENT)
Dept: LAB | Facility: HOSPITAL | Age: 27
End: 2022-03-11

## 2022-03-11 DIAGNOSIS — F33.1 MODERATE EPISODE OF RECURRENT MAJOR DEPRESSIVE DISORDER: ICD-10-CM

## 2022-03-11 DIAGNOSIS — L67.8 ABNORMAL FACIAL HAIR: ICD-10-CM

## 2022-03-11 LAB
ALBUMIN SERPL-MCNC: 4.5 G/DL (ref 3.5–5.2)
ALBUMIN/GLOB SERPL: 1.7 G/DL
ALP SERPL-CCNC: 93 U/L (ref 39–117)
ALT SERPL W P-5'-P-CCNC: 36 U/L (ref 1–33)
ANION GAP SERPL CALCULATED.3IONS-SCNC: 9.5 MMOL/L (ref 5–15)
AST SERPL-CCNC: 23 U/L (ref 1–32)
BASOPHILS # BLD AUTO: 0.05 10*3/MM3 (ref 0–0.2)
BASOPHILS NFR BLD AUTO: 0.6 % (ref 0–1.5)
BILIRUB SERPL-MCNC: 0.2 MG/DL (ref 0–1.2)
BUN SERPL-MCNC: 10 MG/DL (ref 6–20)
BUN/CREAT SERPL: 13.3 (ref 7–25)
CALCIUM SPEC-SCNC: 9.4 MG/DL (ref 8.6–10.5)
CHLORIDE SERPL-SCNC: 105 MMOL/L (ref 98–107)
CHOLEST SERPL-MCNC: 156 MG/DL (ref 0–200)
CO2 SERPL-SCNC: 23.5 MMOL/L (ref 22–29)
CREAT SERPL-MCNC: 0.75 MG/DL (ref 0.57–1)
DEPRECATED RDW RBC AUTO: 37.8 FL (ref 37–54)
EGFRCR SERPLBLD CKD-EPI 2021: 111.4 ML/MIN/1.73
EOSINOPHIL # BLD AUTO: 0.26 10*3/MM3 (ref 0–0.4)
EOSINOPHIL NFR BLD AUTO: 3 % (ref 0.3–6.2)
ERYTHROCYTE [DISTWIDTH] IN BLOOD BY AUTOMATED COUNT: 12 % (ref 12.3–15.4)
GLOBULIN UR ELPH-MCNC: 2.7 GM/DL
GLUCOSE SERPL-MCNC: 91 MG/DL (ref 65–99)
HCT VFR BLD AUTO: 40 % (ref 34–46.6)
HDLC SERPL-MCNC: 48 MG/DL (ref 40–60)
HGB BLD-MCNC: 13.8 G/DL (ref 12–15.9)
IMM GRANULOCYTES # BLD AUTO: 0.02 10*3/MM3 (ref 0–0.05)
IMM GRANULOCYTES NFR BLD AUTO: 0.2 % (ref 0–0.5)
LDLC SERPL CALC-MCNC: 94 MG/DL (ref 0–100)
LDLC/HDLC SERPL: 1.95 {RATIO}
LYMPHOCYTES # BLD AUTO: 2.92 10*3/MM3 (ref 0.7–3.1)
LYMPHOCYTES NFR BLD AUTO: 33.8 % (ref 19.6–45.3)
MCH RBC QN AUTO: 30.2 PG (ref 26.6–33)
MCHC RBC AUTO-ENTMCNC: 34.5 G/DL (ref 31.5–35.7)
MCV RBC AUTO: 87.5 FL (ref 79–97)
MONOCYTES # BLD AUTO: 0.42 10*3/MM3 (ref 0.1–0.9)
MONOCYTES NFR BLD AUTO: 4.9 % (ref 5–12)
NEUTROPHILS NFR BLD AUTO: 4.96 10*3/MM3 (ref 1.7–7)
NEUTROPHILS NFR BLD AUTO: 57.5 % (ref 42.7–76)
NRBC BLD AUTO-RTO: 0 /100 WBC (ref 0–0.2)
PLATELET # BLD AUTO: 276 10*3/MM3 (ref 140–450)
PMV BLD AUTO: 10.8 FL (ref 6–12)
POTASSIUM SERPL-SCNC: 4.3 MMOL/L (ref 3.5–5.2)
PROT SERPL-MCNC: 7.2 G/DL (ref 6–8.5)
RBC # BLD AUTO: 4.57 10*6/MM3 (ref 3.77–5.28)
SODIUM SERPL-SCNC: 138 MMOL/L (ref 136–145)
TRIGL SERPL-MCNC: 71 MG/DL (ref 0–150)
VLDLC SERPL-MCNC: 14 MG/DL (ref 5–40)
WBC NRBC COR # BLD: 8.63 10*3/MM3 (ref 3.4–10.8)

## 2022-03-11 PROCEDURE — 80053 COMPREHEN METABOLIC PANEL: CPT

## 2022-03-11 PROCEDURE — 36415 COLL VENOUS BLD VENIPUNCTURE: CPT

## 2022-03-11 PROCEDURE — 80061 LIPID PANEL: CPT

## 2022-03-11 PROCEDURE — 85025 COMPLETE CBC W/AUTO DIFF WBC: CPT

## 2022-03-11 PROCEDURE — 84403 ASSAY OF TOTAL TESTOSTERONE: CPT

## 2022-03-11 PROCEDURE — 83527 ASSAY OF INSULIN: CPT

## 2022-03-11 PROCEDURE — 83525 ASSAY OF INSULIN: CPT

## 2022-03-14 ENCOUNTER — TELEPHONE (OUTPATIENT)
Dept: FAMILY MEDICINE CLINIC | Facility: CLINIC | Age: 27
End: 2022-03-14

## 2022-03-15 NOTE — TELEPHONE ENCOUNTER
Per Dr. Castro, Ms. Conn has been called with recent Lab results and recommendations.  Continue your current medications and follow-up as planned or sooner if any problems.

## 2022-03-16 ENCOUNTER — TELEPHONE (OUTPATIENT)
Dept: FAMILY MEDICINE CLINIC | Facility: CLINIC | Age: 27
End: 2022-03-16

## 2022-03-16 LAB — TESTOST SERPL-MCNC: 44.8 NG/DL (ref 10–55)

## 2022-03-17 NOTE — TELEPHONE ENCOUNTER
Per , Ms. Conn has been called with recent lab results and recommendations.    Continue current medications and follow-up as planned or sooner if any problems.

## 2022-03-18 LAB
INSULIN FREE SERPL-ACNC: 16 UU/ML
INSULIN SERPL-ACNC: 16 UU/ML

## 2022-03-25 ENCOUNTER — TELEPHONE (OUTPATIENT)
Dept: FAMILY MEDICINE CLINIC | Facility: CLINIC | Age: 27
End: 2022-03-25

## 2022-03-25 NOTE — PROGRESS NOTES
Per , Ms. Conn has been called with recent lab results & recommendations.  Continue current medications and follow-up as planned or sooner if any problems.

## 2022-03-25 NOTE — TELEPHONE ENCOUNTER
Per , Ms. Conn has been called with recent lab results & recommendations.  Continue current medications and follow-up as planned or sooner if any problems.       ----- Message from Kayleen Castro MD sent at 3/25/2022  7:45 AM CDT -----  Insulin levels ok.  ThanksDENNIS

## 2022-04-22 ENCOUNTER — OFFICE VISIT (OUTPATIENT)
Dept: FAMILY MEDICINE CLINIC | Facility: CLINIC | Age: 27
End: 2022-04-22

## 2022-04-22 VITALS
HEART RATE: 98 BPM | SYSTOLIC BLOOD PRESSURE: 110 MMHG | BODY MASS INDEX: 47.29 KG/M2 | WEIGHT: 277 LBS | OXYGEN SATURATION: 98 % | DIASTOLIC BLOOD PRESSURE: 80 MMHG | HEIGHT: 64 IN

## 2022-04-22 DIAGNOSIS — F41.9 ANXIETY: ICD-10-CM

## 2022-04-22 DIAGNOSIS — F33.1 MODERATE EPISODE OF RECURRENT MAJOR DEPRESSIVE DISORDER: ICD-10-CM

## 2022-04-22 DIAGNOSIS — M25.571 ACUTE RIGHT ANKLE PAIN: Primary | ICD-10-CM

## 2022-04-22 PROCEDURE — 99214 OFFICE O/P EST MOD 30 MIN: CPT | Performed by: FAMILY MEDICINE

## 2022-04-22 RX ORDER — FLUOXETINE HYDROCHLORIDE 40 MG/1
40 CAPSULE ORAL DAILY
Qty: 30 CAPSULE | Refills: 2 | Status: SHIPPED | OUTPATIENT
Start: 2022-04-22

## 2022-04-23 ENCOUNTER — TELEPHONE (OUTPATIENT)
Dept: FAMILY MEDICINE CLINIC | Facility: CLINIC | Age: 27
End: 2022-04-23

## 2022-04-23 NOTE — TELEPHONE ENCOUNTER
Attempted to contact patient with results, no answer and unable to leave message.  Will send [employer] message.  Stephanie Castro

## 2022-04-25 ENCOUNTER — TELEPHONE (OUTPATIENT)
Dept: FAMILY MEDICINE CLINIC | Facility: CLINIC | Age: 27
End: 2022-04-25

## 2022-05-02 ENCOUNTER — HOSPITAL ENCOUNTER (OUTPATIENT)
Dept: MRI IMAGING | Facility: HOSPITAL | Age: 27
Discharge: HOME | End: 2022-05-02

## 2022-05-02 DIAGNOSIS — G93.2 BENIGN INTRACRANIAL HYPERTENSION: ICD-10-CM

## 2022-05-02 PROCEDURE — 70544 MR ANGIOGRAPHY HEAD W/O DYE: CPT

## 2022-05-02 PROCEDURE — 70553 MRI BRAIN STEM W/O & W/DYE: CPT

## 2022-05-02 PROCEDURE — 25010000002 GADOTERIDOL PER 1 ML: Performed by: INTERNAL MEDICINE

## 2022-05-02 PROCEDURE — A9579 GAD-BASE MR CONTRAST NOS,1ML: HCPCS | Performed by: INTERNAL MEDICINE

## 2022-05-02 RX ADMIN — GADOTERIDOL 20 ML: 279.3 INJECTION, SOLUTION INTRAVENOUS at 11:59

## 2022-05-16 ENCOUNTER — TELEPHONE (OUTPATIENT)
Dept: FAMILY MEDICINE CLINIC | Facility: CLINIC | Age: 27
End: 2022-05-16

## 2022-05-16 NOTE — TELEPHONE ENCOUNTER
Pt still can't hardly walking on ankle right side  and still swollen and cannot turn side to side so wants an earlier appt if ok by DR   Also saw specialist with 2 MRI and spinal tap over the nerves in her eyes are swollen and fluid was high in spinal and they took 12 tubes of fluid out and says needs to loose weight so needs to change the medicine that causes her her to gain weight     Pt [phone]

## 2022-07-26 ENCOUNTER — OFFICE VISIT (OUTPATIENT)
Dept: FAMILY MEDICINE CLINIC | Facility: CLINIC | Age: 27
End: 2022-07-26

## 2022-07-26 VITALS
HEIGHT: 64 IN | WEIGHT: 271 LBS | RESPIRATION RATE: 18 BRPM | OXYGEN SATURATION: 98 % | HEART RATE: 98 BPM | BODY MASS INDEX: 46.26 KG/M2 | SYSTOLIC BLOOD PRESSURE: 130 MMHG | DIASTOLIC BLOOD PRESSURE: 82 MMHG

## 2022-07-26 DIAGNOSIS — N89.8 VAGINAL DISCHARGE: ICD-10-CM

## 2022-07-26 DIAGNOSIS — M54.50 CHRONIC RIGHT-SIDED LOW BACK PAIN, UNSPECIFIED WHETHER SCIATICA PRESENT: ICD-10-CM

## 2022-07-26 DIAGNOSIS — G89.29 CHRONIC RIGHT HIP PAIN: ICD-10-CM

## 2022-07-26 DIAGNOSIS — F33.1 MODERATE EPISODE OF RECURRENT MAJOR DEPRESSIVE DISORDER: ICD-10-CM

## 2022-07-26 DIAGNOSIS — M25.571 RIGHT ANKLE PAIN, UNSPECIFIED CHRONICITY: ICD-10-CM

## 2022-07-26 DIAGNOSIS — Z00.00 ANNUAL PHYSICAL EXAM: Primary | ICD-10-CM

## 2022-07-26 DIAGNOSIS — M25.551 CHRONIC RIGHT HIP PAIN: ICD-10-CM

## 2022-07-26 DIAGNOSIS — N89.8 VAGINAL ODOR: ICD-10-CM

## 2022-07-26 DIAGNOSIS — F41.9 ANXIETY: ICD-10-CM

## 2022-07-26 DIAGNOSIS — Z12.4 ENCOUNTER FOR PAPANICOLAOU SMEAR FOR CERVICAL CANCER SCREENING: ICD-10-CM

## 2022-07-26 DIAGNOSIS — G89.29 CHRONIC RIGHT-SIDED LOW BACK PAIN, UNSPECIFIED WHETHER SCIATICA PRESENT: ICD-10-CM

## 2022-07-26 LAB
CANDIDA ALBICANS: NEGATIVE
GARDNERELLA VAGINALIS: NEGATIVE
T VAGINALIS DNA VAG QL PROBE+SIG AMP: NEGATIVE

## 2022-07-26 PROCEDURE — 99395 PREV VISIT EST AGE 18-39: CPT | Performed by: FAMILY MEDICINE

## 2022-07-26 PROCEDURE — 87660 TRICHOMONAS VAGIN DIR PROBE: CPT | Performed by: FAMILY MEDICINE

## 2022-07-26 PROCEDURE — 87480 CANDIDA DNA DIR PROBE: CPT | Performed by: FAMILY MEDICINE

## 2022-07-26 PROCEDURE — 3008F BODY MASS INDEX DOCD: CPT | Performed by: FAMILY MEDICINE

## 2022-07-26 PROCEDURE — 87510 GARDNER VAG DNA DIR PROBE: CPT | Performed by: FAMILY MEDICINE

## 2022-07-26 RX ORDER — OMEPRAZOLE 40 MG/1
40 CAPSULE, DELAYED RELEASE ORAL DAILY
Qty: 90 CAPSULE | Refills: 1 | Status: SHIPPED | OUTPATIENT
Start: 2022-07-26 | End: 2022-09-21 | Stop reason: SDUPTHER

## 2022-07-27 ENCOUNTER — TELEPHONE (OUTPATIENT)
Dept: FAMILY MEDICINE CLINIC | Facility: CLINIC | Age: 27
End: 2022-07-27

## 2022-07-27 NOTE — TELEPHONE ENCOUNTER
Per Dr. Castro, Ms. Conn  has been called with recent lab results & recommendations.  Continue current medications and follow-up as planned or sooner if any problems.   ----- Message from Kayleen Castro MD sent at 7/26/2022 10:31 PM CDT -----  Negative for bacterial vaginosis and yeast infection.  Other results still pending, will call when available.  Thanks, DENNIS Castro

## 2022-07-29 LAB — REF LAB TEST METHOD: NORMAL

## 2022-08-03 ENCOUNTER — TELEPHONE (OUTPATIENT)
Dept: FAMILY MEDICINE CLINIC | Facility: CLINIC | Age: 27
End: 2022-08-03

## 2022-08-03 DIAGNOSIS — N89.8 VAGINAL DISCHARGE: Primary | ICD-10-CM

## 2022-08-03 DIAGNOSIS — N89.8 VAGINAL ODOR: ICD-10-CM

## 2022-08-03 NOTE — TELEPHONE ENCOUNTER
Not sure what results she is requesting unless its her Pap Smear Results.     I called her with her Vaginosis Panel results last week on 07/27/2022.    Please Advise  SUSIE Shearer

## 2022-08-11 ENCOUNTER — HOSPITAL ENCOUNTER (OUTPATIENT)
Dept: PHYSICAL THERAPY | Facility: HOSPITAL | Age: 27
Setting detail: THERAPIES SERIES
Discharge: HOME | End: 2022-08-11

## 2022-08-11 DIAGNOSIS — M54.50 CHRONIC RIGHT-SIDED LOW BACK PAIN, UNSPECIFIED WHETHER SCIATICA PRESENT: ICD-10-CM

## 2022-08-11 DIAGNOSIS — G89.29 CHRONIC RIGHT HIP PAIN: Primary | ICD-10-CM

## 2022-08-11 DIAGNOSIS — M25.571 RIGHT ANKLE PAIN, UNSPECIFIED CHRONICITY: ICD-10-CM

## 2022-08-11 DIAGNOSIS — M25.551 CHRONIC RIGHT HIP PAIN: Primary | ICD-10-CM

## 2022-08-11 DIAGNOSIS — G89.29 CHRONIC RIGHT-SIDED LOW BACK PAIN, UNSPECIFIED WHETHER SCIATICA PRESENT: ICD-10-CM

## 2022-08-11 PROCEDURE — 97162 PT EVAL MOD COMPLEX 30 MIN: CPT | Performed by: PHYSICAL THERAPIST

## 2022-08-11 NOTE — THERAPY EVALUATION
Outpatient Physical Therapy Ortho Initial Evaluation  [facility]     Patient Name: Marleny Conn  : 1995  MRN: [id number]  Today's Date: 2022      Visit Date: 2022    Attendance:  (authorization required)  Subjective Improvement: n/a  Next MD Appt: 22  Recert Date: 22    Therapy Diagnosis: R ankle sprain       Past Medical History:   Diagnosis Date   • Acid reflux    • Allergic rhinitis    • Anxiety    • Anxiety    • Benign intracranial hypertension    • Bronchitis     probable   • Common cold    • Conjunctivitis    • Dysfunction of eustachian tube    • Examination of eyes and vision     general; NORMAL   • Gastroenteritis    • Headache    • Hemorrhoids     likely   • Infection of skin and subcutaneous tissue    • Migraine    • Nonvenomous insect bite    • Open wound of lower limb    • Pharyngitis    • Rhinitis    • Tick bite    • URI (upper respiratory infection)         Past Surgical History:   Procedure Laterality Date   • COLONOSCOPY N/A 1/15/2020    Procedure: COLONOSCOPY;  Surgeon: Sterling Neal MD;  Location: [facility] ENDOSCOPY;  Service: Gastroenterology   • ENDOSCOPY N/A 2019    Procedure: ESOPHAGOGASTRODUODENOSCOPY;  Surgeon: Sterling Neal MD;  Location: [facility] ENDOSCOPY;  Service: Gastroenterology   • SALPINGECTOMY Bilateral 2018    Procedure: SALPINGECTOMY LAPAROSCOPIC;  Surgeon: FridayElizabeth MD;  Location: [facility] OR;  Service: Obstetrics/Gynecology   • WISDOM TOOTH EXTRACTION         Visit Dx:     ICD-10-CM ICD-9-CM   1. Chronic right hip pain  M25.551 719.45    G89.29 338.29   2. Chronic right-sided low back pain, unspecified whether sciatica present  M54.50 724.2    G89.29 338.29   3. Right ankle pain, unspecified chronicity  M25.571 719.47          Patient History     Row Name 22 1300             History    Chief Complaint Difficulty with daily activities;Pain;Difficulty Walking  -SS      Type of Pain Ankle pain;Back pain;Lower  "Extremity / Leg  -SS      Date Current Problem(s) Began --  ankle x 3.5 months; LB/hip 4 years  -      Brief Description of Current Complaint Patient has history of chronic low back and R hip/SI pain of 4 years duration. R ankle sprain 2 months ago when the ankle rolled after stepping down. History of multiple ankle sprains. Increased LBP with sitting, prolonged standing, laying on her back or R side. Feels pulling but \"something releases\" when bends. Increased R ankle pain walking, hitting the ankle. Had a lumbar puncture with 12cc of CSF collected. Low back felt some better initially after lumbar puncture. Single female. Lives in a single-level apartment with 1 step to enter.  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 Hobbies: \"do stuff with my kids\"  -SS      What clinical tests have you had for this problem? X-ray  -      Results of Clinical Tests ankle per Radiology: 1. No evidence of fracture.  2. Soft tissue swelling about the lateral ankle.  -SS              Pain     Pain Location Hip;Back;Ankle  right  -SS      Pain at Present 5  -SS      Pain at Best 2  over past 1 month  -SS      Pain at Worst 8  over past 1 month  -      Pain Frequency Constant/continuous  -      Pain Description Sharp;Pressure;Crushing  -      What Performance Factors Make the Current Problem(s) WORSE? see above  -SS      What Performance Factors Make the Current Problem(s) BETTER? see above  -SS      Is your sleep disturbed? Yes  -SS      Is medication used to assist with sleep? Yes  -SS      Difficulties at work? pain  -SS      Difficulties with ADL's? prolonged standing, cooking, cleaning  -      Difficulties with " recreational activities? pain  -SS              Fall Risk Assessment    Any falls in the past year: Yes  -SS      Number of falls reported in the last 12 months 1  -      Factors that contributed to the fall: --  mis-stepped  -SS      Does patient have a fear of falling Yes (comment)  -              Daily Activities    Primary Language English  -              Safety    Are you being hurt, hit, or frightened by anyone at home or in your life? No  -SS      Have you had any of the following issues with Depression;Anxiety  -SS            User Key  (r) = Recorded By, (t) = Taken By, (c) = Cosigned By    Initials Name Provider Type     Reese Guzman, PT DPT Physical Therapist                 PT Ortho     Row Name 08/11/22 1400       Subjective Comments    Subjective Comments see Therapy Patient History  -       Precautions and Contraindications    Precautions benign intracranial hypertension; multiple ankle sprains  -       Subjective Pain    Able to rate subjective pain? yes  -SS    Pre-Treatment Pain Level 5  -SS    Post-Treatment Pain Level 5  -SS       Posture/Observations    Posture/Observations Comments Slight antalgic gait. Slight short R gait.  -SS       Lumbar/SI Special Tests    Lumbar/SI Special Tests Comments TTP R PSIS, lumbar paraspinals, and quadratus lumborum. SI alignment WFLs, but difficult to palpate.  -SS       Ankle/Foot Special Tests    Anterior drawer (ATFL lesion) Right:  painful, no instability  -    Talar tilt test (instability) Right:  pain, no instability  -SS    Ankle Special Tests Comments TTP deltoid ligament, ATFL, PTFL, and calcaneofibular ligament.  -SS       Head/Neck/Trunk    Trunk Extension AROM 10 deg, pain R SI region  -SS    Trunk Flexion AROM 115 deg, pain R SI region worse returning to neutral  -SS    Trunk Lt Lateral Flexion AROM 10 deg, pain R SI  -SS    Trunk Rt Lateral Flexion AROM 13 deg, pain R SI  -SS       Right Lower Ext    Rt Ankle Dorsiflexion  AROM 1 deg, pain anteromedial ankle  -SS    Rt Ankle Plantarflexion AROM 55 deg, pain anteromedial and anterolateral ankle  -SS    Rt Ankle Inversion AROM 20 deg, pain posterior to lateral malleolus  -SS    Rt Ankle Eversion AROM 10 deg, pain medial ankle  -SS          User Key  (r) = Recorded By, (t) = Taken By, (c) = Cosigned By    Initials Name Provider Type    Reese Vazquez, PT DPT Physical Therapist                            Therapy Education  Education Details: ankle abc's; explanation that therapy would not be treating LBP and why  Given: HEP, Other (comment)  Program: New  How Provided: Demonstration, Verbal, Written  Provided to: Patient  Level of Understanding: Verbalized, Demonstrated      PT OP Goals     Row Name 08/11/22 1300          PT Short Term Goals    STG Date to Achieve --  STGs deferred  -SS            Long Term Goals    LTG Date to Achieve 09/08/22  -SS     LTG 1 Independent with HEP/self-management.  -     LTG 2 R ankle DF to be WNLs.  -     LTG 3 R ankle strength 5/5 in all planes.  -SS     LTG 4 Minimal ankle pain with household and work activities.  -            Time Calculation    PT Goal Re-Cert Due Date 09/08/22  -           User Key  (r) = Recorded By, (t) = Taken By, (c) = Cosigned By    Initials Name Provider Type    Reese Vazquez, PT DPT Physical Therapist                 PT Assessment/Plan     Row Name 08/11/22 1400          PT Assessment    Functional Limitations Impaired gait;Limitation in home management;Performance in leisure activities;Limitations in community activities;Limitations in functional capacity and performance;Performance in self-care ADL;Performance in work activities  -     Impairments Balance;Endurance;Gait;Joint mobility;Muscle strength;Pain;Range of motion  -     Assessment Comments Patient has ankle pain and instability due to recent injury on top of chronic instability and injury. She also has chronic LBP that may be  related to intracranial hypertension. LBP does not appear to be amenable to P.T. at this time. I think LPB is a sequelae of her benign intracranial hypertension.  -     Rehab Potential Fair  Barrier: multiple sprains R ankle with possible tear; chronic LBP possibly due to benign intracranial hypertension  -     Patient/caregiver participated in establishment of treatment plan and goals Yes  -SS     Patient would benefit from skilled therapy intervention Yes  -SS            PT Plan    PT Frequency 2x/week  -SS     Predicted Duration of Therapy Intervention (PT) 3-4 weeks  -     Planned CPT's? PT EVAL MOD COMPLEXITY: 23426;PT THER PROC EA 15 MIN: 91868;PT THER ACT EA 15 MIN: 57592;PT NEUROMUSC RE-EDUCATION EA 15 MIN: 06583;PT MANUAL THERAPY EA 15 MIN: 20254;PT ELECTRICAL STIM UNATTEND: ;PT HOT OR COLD PACK TREAT MCARE  -     PT Plan Comments Focus treatment on R ankle issues. Stretching, strengthening, balance and proprioception training, ice with or without IFC estim as needed for pain.  -           User Key  (r) = Recorded By, (t) = Taken By, (c) = Cosigned By    Initials Name Provider Type    SS Reese Guzman, PT DPT Physical Therapist                                    Outcome Measure Options: Modified Oswestry, Lower Extremity Functional Scale (LEFS)  Lower Extremity Functional Index  Any of your usual work, housework or school activities: A little bit of difficulty  Your usual hobbies, recreational or sporting activities: A little bit of difficulty  Getting into or out of the bath: A little bit of difficulty  Walking between rooms: No difficulty  Putting on your shoes or socks: Moderate difficulty  Squatting: Moderate difficulty  Lifting an object, like a bag of groceries from the floor: Moderate difficulty  Performing light activities around your home: A little bit of difficulty  Performing heavy activities around your home: Quite a bit of difficulty  Getting into or out of a car: A  little bit of difficulty  Walking 2 blocks: Extreme difficulty or unable to perform activity  Walking a mile: Extreme difficulty or unable to perform activity  Going up or down 10 stairs (about 1 flight of stairs): Extreme difficulty or unable to perform activity  Standing for 1 hour: Extreme difficulty or unable to perform activity  Sitting for 1 hour: No difficulty  Running on even ground: Extreme difficulty or unable to perform activity  Running on uneven ground: Extreme difficulty or unable to perform activity  Making sharp turns while running fast: Extreme difficulty or unable to perform activity  Hopping: Extreme difficulty or unable to perform activity  Rolling over in bed: A little bit of difficulty  Total: 33  Modified Oswestry  Modified Oswestry Score/Comments: 29/50 = 58%      Time Calculation:     Start Time: 1350  Stop Time: 1436  Time Calculation (min): 46 min     Therapy Charges for Today     Code Description Service Date Service Provider Modifiers Qty    96897991043 HC PT EVAL MOD COMPLEXITY 3 8/11/2022 Reese Guzman, PT DPT GP 1                   Reese Guzman, PT, DPT, CHT  8/11/2022

## 2022-08-16 ENCOUNTER — HOSPITAL ENCOUNTER (OUTPATIENT)
Dept: PHYSICAL THERAPY | Facility: HOSPITAL | Age: 27
Setting detail: THERAPIES SERIES
Discharge: HOME | End: 2022-08-16

## 2022-08-16 DIAGNOSIS — M25.571 RIGHT ANKLE PAIN, UNSPECIFIED CHRONICITY: Primary | ICD-10-CM

## 2022-08-16 PROCEDURE — 97110 THERAPEUTIC EXERCISES: CPT

## 2022-08-16 NOTE — THERAPY TREATMENT NOTE
Outpatient Physical Therapy Ortho Treatment Note  [facility]     Patient Name: Marleny Conn  : 1995  MRN: [id number]  Today's Date: 2022      Visit Date: 2022  Subjective Improvement: n/a  MD visit: 2022  Visit Number:   Total Approved:7 approved.  Recert Date: 2022  Visit Dx:    ICD-10-CM ICD-9-CM   1. Right ankle pain, unspecified chronicity  M25.571 719.47       Patient Active Problem List   Diagnosis   • Rh negative state in antepartum period   • Rubella non-immune status, antepartum   • Back pain affecting pregnancy in third trimester   • Term pregnancy   • Sterilization consult   • Morbidly obese (HCC)   • Epigastric pain   • Nausea   • Change in bowel habits   • Diarrhea   • Generalized abdominal pain        Past Medical History:   Diagnosis Date   • Acid reflux    • Allergic rhinitis    • Anxiety    • Anxiety    • Benign intracranial hypertension    • Bronchitis     probable   • Common cold    • Conjunctivitis    • Dysfunction of eustachian tube    • Examination of eyes and vision     general; NORMAL   • Gastroenteritis    • Headache    • Hemorrhoids     likely   • Infection of skin and subcutaneous tissue    • Migraine    • Nonvenomous insect bite    • Open wound of lower limb    • Pharyngitis    • Rhinitis    • Tick bite    • URI (upper respiratory infection)         Past Surgical History:   Procedure Laterality Date   • COLONOSCOPY N/A 1/15/2020    Procedure: COLONOSCOPY;  Surgeon: Sterling Neal MD;  Location: [facility] ENDOSCOPY;  Service: Gastroenterology   • ENDOSCOPY N/A 2019    Procedure: ESOPHAGOGASTRODUODENOSCOPY;  Surgeon: Sterling Neal MD;  Location: [facility] ENDOSCOPY;  Service: Gastroenterology   • SALPINGECTOMY Bilateral 2018    Procedure: SALPINGECTOMY LAPAROSCOPIC;  Surgeon: FridayElizabeth MD;  Location: [facility] OR;  Service: Obstetrics/Gynecology   • WISDOM TOOTH EXTRACTION          PT Ortho     Row Name 22 1100        Subjective Comments    Subjective Comments Pt reports that she has been icing.  -TL       Precautions and Contraindications    Precautions benign intracranial hypertension; multiple ankle sprains  -TL       Subjective Pain    Able to rate subjective pain? yes  -TL    Pre-Treatment Pain Level 7  -TL    Post-Treatment Pain Level 5  -TL       Posture/Observations    Posture/Observations Comments Slight antalgic gait. Slight short R gait.  -TL          User Key  (r) = Recorded By, (t) = Taken By, (c) = Cosigned By    Initials Name Provider Type    Lola Kruger PTA Physical Therapist Assistant                             PT Assessment/Plan     Row Name 08/16/22 1100          PT Assessment    Assessment Comments Pt painful with INV/ER motion. Pt with slight swelling and bruising. Pt given updated HEP for stretches and ROM. Pt did tolerate towel scrutches for strengthening. Encourage pt to ice for 10 min at least for swelling and pain.  No new goals met.  -TL            PT Plan    PT Frequency 2x/week  -TL     Predicted Duration of Therapy Intervention (PT) 3-4 weeks  -TL     PT Plan Comments start standing ex and balance next visit.  -TL           User Key  (r) = Recorded By, (t) = Taken By, (c) = Cosigned By    Initials Name Provider Type    Lola Kruger PTA Physical Therapist Assistant                 Modalities     Row Name 08/16/22 1100             Ice    Ice Applied Yes  -TL      Location right ankle/heel  -TL      Ice S/P Rx Yes  10 mins  -TL            User Key  (r) = Recorded By, (t) = Taken By, (c) = Cosigned By    Initials Name Provider Type    Lola Kruger PTA Physical Therapist Assistant               OP Exercises     Row Name 08/16/22 1100             Subjective Comments    Subjective Comments Pt reports that she has been icing.  -TL              Subjective Pain    Able to rate subjective pain? yes  -TL      Pre-Treatment Pain Level 7  -TL      Post-Treatment Pain Level 5  -TL               Exercise 1    Exercise Name 1 gastroc S using the wall  -TL      Sets 1 3  -TL      Time 1 30 sec hold  -TL              Exercise 2    Exercise Name 2 soleus S using wall  -TL      Sets 2 3  -TL      Time 2 30  -TL              Exercise 3    Exercise Name 3 alphabet rom ex  -TL      Sets 3 1 set  -TL              Exercise 4    Exercise Name 4 seated HR/TR  -TL      Sets 4 2  -TL      Reps 4 10  -TL      Additional Comments each direction  -TL              Exercise 5    Exercise Name 5 seated HS  -TL      Reps 5 20  -TL              Exercise 6    Exercise Name 6 acrh roller  -TL      Time 6 3 mins  -TL              Exercise 7    Exercise Name 7 wobble board df/pf/circles/EV/INV  -TL      Reps 7 20 each direction  -TL              Exercise 8    Exercise Name 8 toe scrutches  -TL      Time 8 2 mins  -TL              Exercise 9    Exercise Name 9 ice  -TL            User Key  (r) = Recorded By, (t) = Taken By, (c) = Cosigned By    Initials Name Provider Type    Lola Kruger PTA Physical Therapist Assistant                              PT OP Goals     Row Name 08/16/22 1100          PT Short Term Goals    STG Date to Achieve --  STGs deferred  -TL            Long Term Goals    LTG Date to Achieve 09/08/22  -TL     LTG 1 Independent with HEP/self-management.  -TL     LTG 2 R ankle DF to be WNLs.  -TL     LTG 3 R ankle strength 5/5 in all planes.  -TL     LTG 4 Minimal ankle pain with household and work activities.  -TL            Time Calculation    PT Goal Re-Cert Due Date 09/08/22  -TL           User Key  (r) = Recorded By, (t) = Taken By, (c) = Cosigned By    Initials Name Provider Type    Lola Kruger PTA Physical Therapist Assistant                Therapy Education  Education Details: st wall S gastroc/soleus, seated HR/TR, arch roller, toe scrutches, ice  Given: HEP, Symptoms/condition management, Pain management  Program: New, Reinforced  How Provided: Verbal, Demonstration, Written  Provided  to: Patient  Level of Understanding: Teach back education performed, Verbalized, Demonstrated              Time Calculation:   Start Time: 1100  Stop Time: 1149  Time Calculation (min): 49 min  PT Non-Billable Time (min): 10 min  Therapy Charges for Today     Code Description Service Date Service Provider Modifiers Qty    46761034570 HC PT THER PROC EA 15 MIN 8/16/2022 Lola Woods PTA GP, CQ 3                    Lola Woods PTA  8/16/2022

## 2022-08-18 ENCOUNTER — HOSPITAL ENCOUNTER (OUTPATIENT)
Dept: PHYSICAL THERAPY | Facility: HOSPITAL | Age: 27
Setting detail: THERAPIES SERIES
Discharge: HOME | End: 2022-08-18

## 2022-08-18 DIAGNOSIS — M25.571 RIGHT ANKLE PAIN, UNSPECIFIED CHRONICITY: Primary | ICD-10-CM

## 2022-08-18 PROCEDURE — 97110 THERAPEUTIC EXERCISES: CPT | Performed by: PHYSICAL THERAPIST

## 2022-08-18 NOTE — THERAPY TREATMENT NOTE
Outpatient Physical Therapy Ortho Treatment Note  [facility]     Patient Name: Marleny Conn  : 1995  MRN: [id number]  Today's Date: 2022      Visit Date: 2022    Attendance: 3/3 (eval + 6 approved)  Subjective Improvement: 50%  Next MD Appt: 22  Recert Date: 22     Therapy Diagnosis: R ankle sprain     Visit Dx:    ICD-10-CM ICD-9-CM   1. Right ankle pain, unspecified chronicity  M25.571 719.47            Past Medical History:   Diagnosis Date   • Acid reflux    • Allergic rhinitis    • Anxiety    • Anxiety    • Benign intracranial hypertension    • Bronchitis     probable   • Common cold    • Conjunctivitis    • Dysfunction of eustachian tube    • Examination of eyes and vision     general; NORMAL   • Gastroenteritis    • Headache    • Hemorrhoids     likely   • Infection of skin and subcutaneous tissue    • Migraine    • Nonvenomous insect bite    • Open wound of lower limb    • Pharyngitis    • Rhinitis    • Tick bite    • URI (upper respiratory infection)         Past Surgical History:   Procedure Laterality Date   • COLONOSCOPY N/A 1/15/2020    Procedure: COLONOSCOPY;  Surgeon: Sterling Neal MD;  Location: [facility] ENDOSCOPY;  Service: Gastroenterology   • ENDOSCOPY N/A 2019    Procedure: ESOPHAGOGASTRODUODENOSCOPY;  Surgeon: Sterling Neal MD;  Location: [facility] ENDOSCOPY;  Service: Gastroenterology   • SALPINGECTOMY Bilateral 2018    Procedure: SALPINGECTOMY LAPAROSCOPIC;  Surgeon: FridayElizabeth MD;  Location: [facility] OR;  Service: Obstetrics/Gynecology   • WISDOM TOOTH EXTRACTION          PT Ortho     Row Name 22 1100       Subjective Comments    Subjective Comments Ankle pain is better since starting therapy. Has been working on HEP. 50% subjective improvement.  -SS       Precautions and Contraindications    Precautions benign intracranial hypertension; multiple ankle sprains  -SS       Subjective Pain    Able to rate subjective pain? yes  -SS     Pre-Treatment Pain Level 4  -SS    Post-Treatment Pain Level 5  -SS    Subjective Pain Comment declines ice  -       Posture/Observations    Posture/Observations Comments Slight antalgic gait.  -SS       Right Lower Ext    Rt Ankle Dorsiflexion AROM 5 deg  -SS    Rt Ankle Plantarflexion AROM 57 deg  -SS    Rt Ankle Inversion AROM 23 deg, pain distal to lateral malleolus  -    Rt Ankle Eversion AROM 4 deg, pain medial ankle  -          User Key  (r) = Recorded By, (t) = Taken By, (c) = Cosigned By    Initials Name Provider Type     Reese Guzman, PT DPT Physical Therapist                             PT Assessment/Plan     Row Name 08/18/22 1100          PT Assessment    Functional Limitations Impaired gait;Limitation in home management;Performance in leisure activities;Limitations in community activities;Limitations in functional capacity and performance;Performance in self-care ADL;Performance in work activities  -     Impairments Balance;Endurance;Gait;Joint mobility;Muscle strength;Pain;Range of motion  -     Assessment Comments Ankle sore laterally with therex this date. Good effort. No change to HEP.  -     Rehab Potential Fair  Barrier: multiple sprains R ankle with possible tear; chronic LBP possibly due to benign intracranial hypertension  -     Patient/caregiver participated in establishment of treatment plan and goals Yes  -     Patient would benefit from skilled therapy intervention Yes  -SS            PT Plan    PT Frequency 2x/week  -     Predicted Duration of Therapy Intervention (PT) 3-4 weeks  -     PT Plan Comments Continue POC. T-band ankle DF, PF, IV next. Continue isometric ankle EV next.  -           User Key  (r) = Recorded By, (t) = Taken By, (c) = Cosigned By    Initials Name Provider Type    Reese Vazquez, PT DPT Physical Therapist                   OP Exercises     Row Name 08/18/22 1100             Subjective Comments    Subjective Comments  Ankle pain is better since starting therapy. Has been working on HEP. 50% subjective improvement.  -SS              Subjective Pain    Able to rate subjective pain? yes  -SS      Pre-Treatment Pain Level 4  -SS      Post-Treatment Pain Level 5  -SS      Subjective Pain Comment declines ice  -SS              Exercise 1    Exercise Name 1 Pro2, Seat 11, LE  -SS      Cueing 1 Verbal  -SS      Time 1 8 mins  -SS      Additional Comments Level 2  -SS              Exercise 2    Exercise Name 2 Incline calf stretch - gastroc  -SS      Cueing 2 Verbal;Demo  -SS      Sets 2 3  -SS      Time 2 30 sec hold  -SS              Exercise 3    Exercise Name 3 Incline calf stretch - soleus  -SS      Cueing 3 Verbal;Demo  -SS      Sets 3 3  -SS      Time 3 30 sec hold  -SS              Exercise 4    Exercise Name 4 SLS - R  -SS      Cueing 4 Verbal;Demo  -SS      Sets 4 3  -SS      Time 4 max hold  -SS              Exercise 5    Exercise Name 5 Rocker board - DF/PF, EV/IV  -SS      Cueing 5 Verbal;Demo  -SS      Sets 5 1  -SS      Reps 5 20 each  -SS              Exercise 6    Exercise Name 6 Alternating hallux extension and digit 2-5 extension  -SS      Cueing 6 Verbal;Tactile;Demo  -SS      Sets 6 2  -SS      Reps 6 10  -SS              Exercise 7    Exercise Name 7 Ankle EV/IV floor wipes  -SS      Cueing 7 Verbal;Tactile;Demo  -SS      Sets 7 2  -SS      Reps 7 10  -SS              Exercise 8    Exercise Name 8 Isometric ankle EV  -SS      Cueing 8 Verbal;Tactile  -SS      Sets 8 1  -SS      Reps 8 10  -SS      Time 8 5 sec hold  -SS              Exercise 9    Exercise Name 9 check AROM  -SS            User Key  (r) = Recorded By, (t) = Taken By, (c) = Cosigned By    Initials Name Provider Type    SS Reese Guzman, PT DPT Physical Therapist                              PT OP Goals     Row Name 08/18/22 1100          PT Short Term Goals    STG Date to Achieve --  STGs deferred  -SS            Long Term Goals    LTG Date to  Achieve 09/08/22  -     LTG 1 Independent with HEP/self-management.  -     LTG 1 Progress Ongoing  -     LTG 2 R ankle DF to be WNLs.  -     LTG 2 Progress Ongoing  -     LTG 3 R ankle strength 5/5 in all planes.  -SS     LTG 3 Progress Ongoing  -     LTG 4 Minimal ankle pain with household and work activities.  -     LTG 4 Progress Ongoing  -            Time Calculation    PT Goal Re-Cert Due Date 09/08/22  -           User Key  (r) = Recorded By, (t) = Taken By, (c) = Cosigned By    Initials Name Provider Type     Reese Guzman, PT DPT Physical Therapist                Therapy Education  Given: HEP  Program: Reinforced  How Provided: Verbal  Provided to: Patient  Level of Understanding: Verbalized              Time Calculation:   Start Time: 1058  Stop Time: 1136  Time Calculation (min): 38 min  Therapy Charges for Today     Code Description Service Date Service Provider Modifiers Qty    90270711696 HC PT THER PROC EA 15 MIN 8/18/2022 Reese Guzman, PT DPT GP 3                    Reese Guzman, PT, DPT, CHT  8/18/2022

## 2022-08-19 ENCOUNTER — OFFICE VISIT (OUTPATIENT)
Dept: OBSTETRICS AND GYNECOLOGY | Facility: CLINIC | Age: 27
End: 2022-08-19

## 2022-08-19 ENCOUNTER — LAB (OUTPATIENT)
Dept: LAB | Facility: HOSPITAL | Age: 27
End: 2022-08-19

## 2022-08-19 VITALS
WEIGHT: 271 LBS | SYSTOLIC BLOOD PRESSURE: 120 MMHG | BODY MASS INDEX: 46.26 KG/M2 | HEIGHT: 64 IN | DIASTOLIC BLOOD PRESSURE: 84 MMHG

## 2022-08-19 DIAGNOSIS — N89.8 VAGINAL DISCHARGE: ICD-10-CM

## 2022-08-19 DIAGNOSIS — N92.1 MENORRHAGIA WITH IRREGULAR CYCLE: Primary | ICD-10-CM

## 2022-08-19 DIAGNOSIS — R10.2 PELVIC PAIN: ICD-10-CM

## 2022-08-19 PROBLEM — Z34.90 TERM PREGNANCY: Status: RESOLVED | Noted: 2018-07-05 | Resolved: 2022-08-19

## 2022-08-19 PROBLEM — M54.9 BACK PAIN AFFECTING PREGNANCY IN THIRD TRIMESTER: Status: RESOLVED | Noted: 2018-06-26 | Resolved: 2022-08-19

## 2022-08-19 PROBLEM — Z28.39 RUBELLA NON-IMMUNE STATUS, ANTEPARTUM: Status: RESOLVED | Noted: 2018-06-26 | Resolved: 2022-08-19

## 2022-08-19 PROBLEM — R11.0 NAUSEA: Status: RESOLVED | Noted: 2020-01-06 | Resolved: 2022-08-19

## 2022-08-19 PROBLEM — O26.899 RH NEGATIVE STATE IN ANTEPARTUM PERIOD: Status: RESOLVED | Noted: 2018-02-27 | Resolved: 2022-08-19

## 2022-08-19 PROBLEM — O09.899 RUBELLA NON-IMMUNE STATUS, ANTEPARTUM: Status: RESOLVED | Noted: 2018-06-26 | Resolved: 2022-08-19

## 2022-08-19 PROBLEM — Z30.09 STERILIZATION CONSULT: Status: RESOLVED | Noted: 2018-07-31 | Resolved: 2022-08-19

## 2022-08-19 PROBLEM — Z67.91 RH NEGATIVE STATE IN ANTEPARTUM PERIOD: Status: RESOLVED | Noted: 2018-02-27 | Resolved: 2022-08-19

## 2022-08-19 PROBLEM — O99.891 BACK PAIN AFFECTING PREGNANCY IN THIRD TRIMESTER: Status: RESOLVED | Noted: 2018-06-26 | Resolved: 2022-08-19

## 2022-08-19 LAB
CHOLEST SERPL-MCNC: 162 MG/DL (ref 0–200)
ESTRADIOL SERPL HS-MCNC: 102 PG/ML
FSH SERPL-ACNC: 3.56 MIU/ML
HBA1C MFR BLD: 5.4 % (ref 4.8–5.6)
HDLC SERPL-MCNC: 48 MG/DL (ref 40–60)
LDLC SERPL CALC-MCNC: 99 MG/DL (ref 0–100)
LDLC/HDLC SERPL: 2.06 {RATIO}
LH SERPL-ACNC: 9.2 MIU/ML
PROGEST SERPL-MCNC: 2.35 NG/ML
TRIGL SERPL-MCNC: 76 MG/DL (ref 0–150)
TSH SERPL DL<=0.05 MIU/L-ACNC: 1.89 UIU/ML (ref 0.27–4.2)
VLDLC SERPL-MCNC: 15 MG/DL (ref 5–40)

## 2022-08-19 PROCEDURE — 82670 ASSAY OF TOTAL ESTRADIOL: CPT | Performed by: NURSE PRACTITIONER

## 2022-08-19 PROCEDURE — 99214 OFFICE O/P EST MOD 30 MIN: CPT | Performed by: NURSE PRACTITIONER

## 2022-08-19 PROCEDURE — 84144 ASSAY OF PROGESTERONE: CPT | Performed by: NURSE PRACTITIONER

## 2022-08-19 PROCEDURE — 83525 ASSAY OF INSULIN: CPT | Performed by: NURSE PRACTITIONER

## 2022-08-19 PROCEDURE — 83001 ASSAY OF GONADOTROPIN (FSH): CPT | Performed by: NURSE PRACTITIONER

## 2022-08-19 PROCEDURE — 80061 LIPID PANEL: CPT | Performed by: NURSE PRACTITIONER

## 2022-08-19 PROCEDURE — 36415 COLL VENOUS BLD VENIPUNCTURE: CPT | Performed by: NURSE PRACTITIONER

## 2022-08-19 PROCEDURE — 84443 ASSAY THYROID STIM HORMONE: CPT | Performed by: NURSE PRACTITIONER

## 2022-08-19 PROCEDURE — 83002 ASSAY OF GONADOTROPIN (LH): CPT | Performed by: NURSE PRACTITIONER

## 2022-08-19 PROCEDURE — 83036 HEMOGLOBIN GLYCOSYLATED A1C: CPT | Performed by: NURSE PRACTITIONER

## 2022-08-19 PROCEDURE — 84270 ASSAY OF SEX HORMONE GLOBUL: CPT | Performed by: NURSE PRACTITIONER

## 2022-08-19 PROCEDURE — 84402 ASSAY OF FREE TESTOSTERONE: CPT | Performed by: NURSE PRACTITIONER

## 2022-08-19 PROCEDURE — 84403 ASSAY OF TOTAL TESTOSTERONE: CPT | Performed by: NURSE PRACTITIONER

## 2022-08-19 RX ORDER — ACETAZOLAMIDE 250 MG/1
TABLET ORAL
COMMUNITY
Start: 2022-05-13 | End: 2022-09-21

## 2022-08-20 LAB
INSULIN SERPL-ACNC: 26 UIU/ML (ref 2.6–24.9)
SHBG SERPL-SCNC: 64.2 NMOL/L (ref 24.6–122)

## 2022-08-22 ENCOUNTER — APPOINTMENT (OUTPATIENT)
Dept: PHYSICAL THERAPY | Facility: HOSPITAL | Age: 27
End: 2022-08-22

## 2022-08-24 ENCOUNTER — APPOINTMENT (OUTPATIENT)
Dept: PHYSICAL THERAPY | Facility: HOSPITAL | Age: 27
End: 2022-08-24

## 2022-08-25 ENCOUNTER — APPOINTMENT (OUTPATIENT)
Dept: PHYSICAL THERAPY | Facility: HOSPITAL | Age: 27
End: 2022-08-25

## 2022-08-26 ENCOUNTER — TELEPHONE (OUTPATIENT)
Dept: OBSTETRICS AND GYNECOLOGY | Facility: CLINIC | Age: 27
End: 2022-08-26

## 2022-08-26 RX ORDER — METRONIDAZOLE 500 MG/1
500 TABLET ORAL 2 TIMES DAILY
Qty: 14 TABLET | Refills: 0 | Status: SHIPPED | OUTPATIENT
Start: 2022-08-26 | End: 2022-09-02

## 2022-08-26 RX ORDER — DOXYCYCLINE HYCLATE 100 MG/1
100 CAPSULE ORAL 2 TIMES DAILY
Qty: 14 CAPSULE | Refills: 0 | Status: SHIPPED | OUTPATIENT
Start: 2022-08-26 | End: 2022-09-02

## 2022-08-26 RX ORDER — FLUCONAZOLE 150 MG/1
TABLET ORAL
Qty: 2 TABLET | Refills: 0 | Status: SHIPPED | OUTPATIENT
Start: 2022-08-26 | End: 2022-09-21

## 2022-08-26 NOTE — TELEPHONE ENCOUNTER
CALLED AND SPOKE TO THE PT REGARDING ONE SWAB RESULTS.  SHE IS POSITIVE FOR SEVERAL BACTERIAL INFECTIONS.  SENT IN SEVERAL RX TO THE PT'S PHARMACY, ALSO SENT IN DOXYCYCLINE TO TREAT PT'S PARTNER (NO ALLERGY) TO THE PHARMACY AS WELL.  (JAYLA REHMAN [birth year])  PT AWARE.

## 2022-08-29 ENCOUNTER — TELEPHONE (OUTPATIENT)
Dept: PHYSICAL THERAPY | Facility: HOSPITAL | Age: 27
End: 2022-08-29

## 2022-08-29 PROBLEM — R10.2 PELVIC PAIN: Status: ACTIVE | Noted: 2022-08-29

## 2022-08-29 PROBLEM — N92.1 MENORRHAGIA WITH IRREGULAR CYCLE: Status: ACTIVE | Noted: 2022-08-29

## 2022-08-29 LAB
TESTOST FREE MFR SERPL: ABNORMAL %
TESTOST FREE SERPL-MCNC: ABNORMAL PG/ML
TESTOST SERPL-MCNC: 76.4 NG/DL (ref 10–55)

## 2022-08-29 NOTE — PROGRESS NOTES
"Subjective   Chief Complaint   Patient presents with   • Gynecologic Exam     Marleny Conn is a 27 y.o. year old  presenting to be seen with complaints of trouble with her menses.   Current birth control method: tubal ligation.    Patient's last menstrual period was 2022.    The last time she recalls having predictable regular cycles was unsure.      · Additional notable symptoms: none  · Changes in weight: weight has been stable  · Recent increase in stress? no  · Is there associated pain ? yes    Past 6 month menstrual history:    Cycle Frequency: vary between 18 and 21 days   Menstrual cycle character: Days 1 & 2 are \"normal\" flow but the other 5 days she saturates a super tampon and pad every hour or less   Cycle Duration: 6 - 7   Number of heavy days of flows: 5   Dysmenorrhea: moderate and is not affecting her activities of daily living   PMS: moderate and is not affecting her activities of daily living   Intermenstrual bleeding present: {yes   Post-coital bleeding present: no     She is sexually active.  In the past 12 months there has not been new sexual partners.  Condoms are not typically used.  She would like to be screened for STD's at today's exam.     Additional Complaints:  Vaginitis  Patient presents for evaluation of an abnormal vaginal discharge. Symptoms have been present for several months. Vaginal symptoms: burning, discharge described as creamy, malodorous and milky, local irritation, odor and vulvar itching. Contraception: tubal ligation. She denies blisters, bumps, lesions, tears and warts. Sexually transmitted infection risk: very low risk of STD exposure.                         The following portions of the patient's history were reviewed and updated as appropriate:problem list, current medications, allergies, past family history, past medical history, past social history and past surgical history    Social History    Tobacco Use      Smoking status: Former Smoker       " " Packs/day: 1.00        Years: 13.00        Pack years: 13        Types: Cigarettes        Start date: 10/13/2008        Quit date: 2019        Years since quittin.9      Smokeless tobacco: Never Used    Review of Systems   Constitutional: Negative for activity change, appetite change, chills, diaphoresis, fatigue, fever, unexpected weight gain and unexpected weight loss.   Respiratory: Negative for chest tightness and shortness of breath.    Cardiovascular: Negative for chest pain and palpitations.   Gastrointestinal: Negative for abdominal distention, abdominal pain, constipation and diarrhea.   Genitourinary: Positive for menstrual problem, pelvic pain, pelvic pressure, vaginal bleeding and vaginal discharge. Negative for amenorrhea, breast discharge, breast lump, breast pain, decreased libido, decreased urine volume, difficulty urinating, dyspareunia, dysuria, frequency, urgency, urinary incontinence and vaginal pain.   Musculoskeletal: Negative for myalgias.   Skin: Negative for color change, dry skin and skin lesions.   Neurological: Negative for light-headedness and headache.   Psychiatric/Behavioral: Negative for agitation, dysphoric mood, sleep disturbance, depressed mood and stress. The patient is not nervous/anxious.         Objective   /84   Ht 162.6 cm (64\")   Wt 123 kg (271 lb)   LMP 2022   Breastfeeding No   BMI 46.52 kg/m²     Physical Exam  Vitals and nursing note reviewed.   Constitutional:       General: She is awake. She is not in acute distress.     Appearance: Normal appearance. She is well-developed and well-groomed. She is morbidly obese. She is not ill-appearing, toxic-appearing or diaphoretic.   Genitourinary:     General: Normal vulva.      Exam position: Lithotomy position.      Scar stage (genital): 5.      Labia:         Right: No rash, tenderness, lesion or injury.         Left: No rash, tenderness, lesion or injury.       Urethra: No prolapse, urethral " pain, urethral swelling or urethral lesion.      Vagina: No signs of injury and foreign body. Vaginal discharge present. No erythema, tenderness, bleeding, lesions or prolapsed vaginal walls.      Cervix: Cervical motion tenderness, discharge and friability present. No lesion, erythema, cervical bleeding or eversion.      Comments: Uterus and ovaries not palpable 2/2 body habitus. One swab obtained.  Lymphadenopathy:      Lower Body: No right inguinal adenopathy. No left inguinal adenopathy.   Neurological:      Mental Status: She is alert.   Psychiatric:         Attention and Perception: Attention and perception normal.         Mood and Affect: Mood and affect normal.         Speech: Speech normal.         Behavior: Behavior normal. Behavior is cooperative.         Lab Review   none available    Imaging   none available         Diagnoses and all orders for this visit:    Menorrhagia with irregular cycle  -     Estradiol  -     Follicle Stimulating Hormone  -     Insulin, Total  -     Luteinizing Hormone  -     Lipid Panel  -     Progesterone  -     Sex Horm Binding Globulin  -     Testosterone, F Eqlib & T LC / MS  -     TSH  -     Hemoglobin A1c    Pelvic pain  -     OneSwab - Kit, Vagina; Future  -     OneSwab - Kit, Vagina    Vaginal discharge  -     OneSwab - Kit, Vagina; Future  -     OneSwab - Kit, Vagina    Other orders  -     diclofenac (VOLTAREN) 50 MG EC tablet; Take 50 mg by mouth.  -     acetaZOLAMIDE (DIAMOX) 250 MG tablet        No orders of the defined types were placed in this encounter.    Fasting labs ASAP One Swab today, will notify of all results when available.     This note was electronically signed.    Tanna Schafer, PETTY    August 29, 2022

## 2022-09-13 ENCOUNTER — TELEPHONE (OUTPATIENT)
Dept: OBSTETRICS AND GYNECOLOGY | Facility: CLINIC | Age: 27
End: 2022-09-13

## 2022-09-13 DIAGNOSIS — N92.1 MENORRHAGIA WITH IRREGULAR CYCLE: Primary | ICD-10-CM

## 2022-09-13 RX ORDER — NORGESTIMATE AND ETHINYL ESTRADIOL 7DAYSX3 28
1 KIT ORAL DAILY
Qty: 28 TABLET | Refills: 3 | Status: SHIPPED | OUTPATIENT
Start: 2022-09-13 | End: 2023-09-13

## 2022-09-21 ENCOUNTER — OFFICE VISIT (OUTPATIENT)
Dept: GASTROENTEROLOGY | Facility: CLINIC | Age: 27
End: 2022-09-21

## 2022-09-21 ENCOUNTER — OFFICE VISIT (OUTPATIENT)
Dept: FAMILY MEDICINE CLINIC | Facility: CLINIC | Age: 27
End: 2022-09-21

## 2022-09-21 VITALS
HEART RATE: 90 BPM | OXYGEN SATURATION: 99 % | DIASTOLIC BLOOD PRESSURE: 90 MMHG | SYSTOLIC BLOOD PRESSURE: 130 MMHG | BODY MASS INDEX: 46.78 KG/M2 | HEIGHT: 64 IN | WEIGHT: 274 LBS

## 2022-09-21 VITALS
WEIGHT: 274 LBS | SYSTOLIC BLOOD PRESSURE: 130 MMHG | DIASTOLIC BLOOD PRESSURE: 90 MMHG | HEIGHT: 64 IN | BODY MASS INDEX: 46.78 KG/M2 | HEART RATE: 90 BPM

## 2022-09-21 DIAGNOSIS — G93.2 IDIOPATHIC INTRACRANIAL HYPERTENSION: Primary | ICD-10-CM

## 2022-09-21 DIAGNOSIS — G89.29 CHRONIC BILATERAL LOW BACK PAIN WITHOUT SCIATICA: ICD-10-CM

## 2022-09-21 DIAGNOSIS — K76.9 LIVER LESION, RIGHT LOBE: Primary | ICD-10-CM

## 2022-09-21 DIAGNOSIS — E66.01 CLASS 3 SEVERE OBESITY DUE TO EXCESS CALORIES WITH SERIOUS COMORBIDITY AND BODY MASS INDEX (BMI) OF 45.0 TO 49.9 IN ADULT: ICD-10-CM

## 2022-09-21 DIAGNOSIS — K58.0 IRRITABLE BOWEL SYNDROME WITH DIARRHEA: ICD-10-CM

## 2022-09-21 DIAGNOSIS — R10.84 GENERALIZED ABDOMINAL PAIN: ICD-10-CM

## 2022-09-21 DIAGNOSIS — M54.50 CHRONIC BILATERAL LOW BACK PAIN WITHOUT SCIATICA: ICD-10-CM

## 2022-09-21 DIAGNOSIS — K21.00 GASTROESOPHAGEAL REFLUX DISEASE WITH ESOPHAGITIS WITHOUT HEMORRHAGE: ICD-10-CM

## 2022-09-21 DIAGNOSIS — K21.9 GASTROESOPHAGEAL REFLUX DISEASE, UNSPECIFIED WHETHER ESOPHAGITIS PRESENT: ICD-10-CM

## 2022-09-21 PROCEDURE — 99213 OFFICE O/P EST LOW 20 MIN: CPT | Performed by: NURSE PRACTITIONER

## 2022-09-21 PROCEDURE — 99214 OFFICE O/P EST MOD 30 MIN: CPT | Performed by: FAMILY MEDICINE

## 2022-09-21 RX ORDER — OMEPRAZOLE 40 MG/1
40 CAPSULE, DELAYED RELEASE ORAL DAILY
Qty: 90 CAPSULE | Refills: 1 | Status: SHIPPED | OUTPATIENT
Start: 2022-09-21

## 2022-09-21 NOTE — PROGRESS NOTES
Chief Complaint  Ankle Pain (Right ankle )    Subjective    History of Present Illness {CC  Problem List  Visit  Diagnosis   Encounters  Notes  Medications  Labs  Result Review Imaging  Media :23}     Marleny Conn presents to [facility] [place] for     Chief Complaint   Patient presents with   • Ankle Pain     Right ankle       Patient seen today for follow-up right ankle pain.  Notes that this has resolved with conservative measures and physical therapy.  She is no longer having any trouble.  Patient is having problems with headaches, vision changes and pressure in her back.  She notes that anytime she is moving she is having pain in her low back.  Patient reported that physical therapy would not do exercises secondary to concern for intracranial hypertension.  Patient has seen ophthalmology for this concern, and had spinal type for fluid reduction.  She was previously on Diamox, but thinks that she could not tolerate this medication and has not been taking.  Patient feels like pressure is increasing again.  She desires weight loss, but has not been successful with weight loss despite lifestyle modification.  Exercise is limited by back pain at this time.  Patient is open to bariatric surgery or any weight loss options.      Current Outpatient Medications:   •  buPROPion XL (Wellbutrin XL) 300 MG 24 hr tablet, Take 1 tablet by mouth Every Morning., Disp: 30 tablet, Rfl: 2  •  cetirizine (zyrTEC) 10 MG tablet, Take 10 mg by mouth Daily., Disp: , Rfl:   •  Elastic Bandages & Supports (Aircast Sport Ankle Brace/Rght) misc, Use for ankle support, Disp: 1 each, Rfl: 2  •  FLUoxetine (PROzac) 40 MG capsule, Take 1 capsule by mouth Daily., Disp: 30 capsule, Rfl: 2  •  norgestimate-ethinyl estradiol (Tri-Sprintec) 0.18/0.215/0.25 MG-35 MCG per tablet, Take 1 tablet by mouth Daily., Disp: 28 tablet, Rfl: 3  •  omeprazole (priLOSEC) 40 MG capsule, Take 1  "capsule by mouth Daily., Disp: 90 capsule, Rfl: 1  •  vitamin B-12 (CYANOCOBALAMIN) 1000 MCG tablet, TAKE 1 TABLET BY MOUTH DAILY., Disp: 90 tablet, Rfl: 1  •  vitamin D (ERGOCALCIFEROL) 1.25 MG (59079 UT) capsule capsule, TAKE 1 CAPSULE BY MOUTH 1 (ONE) TIME PER WEEK., Disp: 4 capsule, Rfl: 0  •  busPIRone (BUSPAR) 10 MG tablet, Take 1 tablet by mouth 2 (Two) Times a Day., Disp: 60 tablet, Rfl: 2  •  diclofenac (VOLTAREN) 50 MG EC tablet, Take 50 mg by mouth., Disp: , Rfl:      Objective       Vital Signs:   /90   Pulse 90   Ht 162.6 cm (64\")   Wt 124 kg (274 lb)   SpO2 99%   BMI 47.03 kg/m²     Physical Exam  Vitals reviewed.   Constitutional:       General: She is not in acute distress.     Appearance: She is well-developed.   Cardiovascular:      Rate and Rhythm: Normal rate and regular rhythm.      Heart sounds: Normal heart sounds. No murmur heard.  Pulmonary:      Effort: Pulmonary effort is normal. No respiratory distress.      Breath sounds: Normal breath sounds. No wheezing or rales.   Skin:     General: Skin is warm and dry.   Neurological:      Mental Status: She is alert and oriented to person, place, and time.        Result Review :{ Labs  Result Review  Imaging  Med Tab  Media :23}   The following data was reviewed by: Kayleen Castro MD on 09/21/2022    Common labs    Common Labs 3/11/22 3/11/22 3/11/22 8/19/22 8/19/22    1122 1122 1122 1057 1057   Glucose   91     BUN   10     Creatinine   0.75     Sodium   138     Potassium   4.3     Chloride   105     Calcium   9.4     Albumin   4.50     Total Bilirubin   0.2     Alkaline Phosphatase   93     AST (SGOT)   23     ALT (SGPT)   36 (A)     WBC 8.63       Hemoglobin 13.8       Hematocrit 40.0       Platelets 276       Total Cholesterol  156   162   Triglycerides  71   76   HDL Cholesterol  48   48   LDL Cholesterol   94   99   Hemoglobin A1C    5.40    (A) Abnormal value                    Assessment and Plan {CC Problem List  Visit " Diagnosis  ROS  Review (Popup)  [facility]  Quality  BestPractice  Medications  SmartSets  SnapShot Encounters  Media :23}   Diagnoses and all orders for this visit:    1. Idiopathic intracranial hypertension (Primary)  -     Ambulatory Referral to Neurology  -     Ambulatory Referral to Bariatric Surgery    2. Class 3 severe obesity due to excess calories with serious comorbidity and body mass index (BMI) of 45.0 to 49.9 in adult (HCC)  -     Ambulatory Referral to Bariatric Surgery    3. Gastroesophageal reflux disease, unspecified whether esophagitis present  -     Ambulatory Referral to Bariatric Surgery    4. Chronic bilateral low back pain without sciatica      Patient seen today for follow-up  Has diagnosis of idiopathic intracranial hypertension  Referral to neurology to help with management  Patient advised to call her ophthalmologist today to set up visit in the next week  May need lumbar puncture again for treatment  Discussed weight loss was an important part of treatment    Body mass index is 47.03 kg/m².  Class 3 Severe Obesity (BMI >=40). Obesity-related health conditions include the following: GERD and idiopathic intracranial hypertension. Obesity is unchanged. BMI is is above average; BMI management plan is completed. We discussed low calorie, low carb based diet program, portion control, increasing exercise, consulting a Bariatric surgeon and management of depression/anxiety/stress to control compensatory eating.  Referral to [facility] weight loss center placed today    Suspect that the majority of patient's chronic bilateral low back pain is musculoskeletal  Recommended heat and Tylenol use as needed  Light stretching exercises at home okay  After evaluation with neurology, will plan to get patient back into physical therapy for back and core muscle strengthening      Follow Up {Instructions Charge Capture  Follow-up Communications :23}   Return in about 6 weeks (around  11/2/2022) for Recheck.  Patient was given instructions and counseling regarding her condition or for health maintenance advice. Please see specific information pulled into the AVS if appropriate.            This document has been electronically signed by Kayleen Castro MD

## 2022-09-21 NOTE — PROGRESS NOTES
Chief Complaint   Patient presents with   • Gas   • Bloated       Subjective    Marleny Conn is a 27 y.o. female. she is here today for follow-up.    27-year-old female presents to discuss worsening abdominal pain bloating and recurrent diarrhea.  We previously tried her on dicyclomine but states it did not help symptoms so she stopped it.  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  She had CT which noted liver lesion follow-up MRI noted lesion to be most consistent with hemangioma but recommended repeat imaging at 1 year to ensure stability.  States reflux is well controlled with omeprazole daily.    Gas  This is a chronic problem. The current episode started more than 1 month ago. The problem occurs intermittently. The problem has been waxing and waning. Associated symptoms include abdominal pain (bloating ), a change in bowel habit (diarrhea yesterday ) and fatigue. Pertinent negatives include no anorexia, arthralgias, chest pain, chills, congestion, coughing, diaphoresis, fever, headaches, joint swelling, myalgias, nausea, neck pain, numbness, sore throat or vomiting. The symptoms are aggravated by eating.             The following portions of the patient's history were reviewed and updated as appropriate:   Past Medical History:   Diagnosis Date   • Acid reflux    • Allergic rhinitis    • Anxiety    • Anxiety    • Benign intracranial hypertension    • Bronchitis     probable   • Common cold    • Conjunctivitis    • Dysfunction of eustachian tube    • Examination of eyes and vision     general; NORMAL   • Gastroenteritis    • Headache    • Hemorrhoids     likely   • Infection of skin and subcutaneous tissue    • Migraine    • Nonvenomous insect bite    • Open wound of lower limb    • Pharyngitis    • Rhinitis    • Tick bite    • URI (upper respiratory infection)      Past Surgical History:   Procedure  Laterality Date   • COLONOSCOPY N/A 1/15/2020    Procedure: COLONOSCOPY;  Surgeon: Sterling Neal MD;  Location: [facility] ENDOSCOPY;  Service: Gastroenterology   • ENDOSCOPY N/A 2019    Procedure: ESOPHAGOGASTRODUODENOSCOPY;  Surgeon: Sterling Neal MD;  Location: [facility] ENDOSCOPY;  Service: Gastroenterology   • SALPINGECTOMY Bilateral 2018    Procedure: SALPINGECTOMY LAPAROSCOPIC;  Surgeon: FriElizabeth sherman MD;  Location: [facility] OR;  Service: Obstetrics/Gynecology   • WISDOM TOOTH EXTRACTION       Family History   Problem Relation Age of Onset   • Hypertension Brother    • Hypertension Paternal Grandmother    • Hypertension Maternal Grandmother    • Diabetes Maternal Grandmother    • No Known Problems Father    • No Known Problems Mother    • Heart attack Paternal Grandfather    • Hypertension Maternal Grandfather    • Sleep apnea Maternal Grandfather    • Heart disease Other    • Stroke Other    • Hypertension Other    • Diabetes Other    • Crohn's disease Other         Maternal Grandmother - Ana Rosa Santamaria   • Diverticulitis Other         Maternal Grandmother - Ana Rosa Santamaria   • Colon polyps Other         Maternal Grandmother - Ana Rosa Santamaria     OB History        2    Para   2    Term   2            AB        Living   2       SAB        IAB        Ectopic        Molar        Multiple   0    Live Births   2              Prior to Admission medications    Medication Sig Start Date End Date Taking? Authorizing Provider   buPROPion XL (Wellbutrin XL) 300 MG 24 hr tablet Take 1 tablet by mouth Every Morning. 22  Yes Kayleen Castro MD   busPIRone (BUSPAR) 10 MG tablet Take 1 tablet by mouth 2 (Two) Times a Day. 3/9/22  Yes Kayleen Castro MD   cetirizine (zyrTEC) 10 MG tablet Take 10 mg by mouth Daily. 10/14/21  Yes ProviderDivya MD   diclofenac (VOLTAREN) 50 MG EC tablet Take 50 mg by mouth. 22  Yes Divya Dixon MD   Elastic Bandages & Supports (Aircast  Sport Ankle Brace/Rght) misc Use for ankle support 22  Yes Kayleen Castro MD   FLUoxetine (PROzac) 40 MG capsule Take 1 capsule by mouth Daily. 22  Yes Kayleen Castro MD   norgestimate-ethinyl estradiol (Tri-Sprintec) 0.18/0.215/0.25 MG-35 MCG per tablet Take 1 tablet by mouth Daily. 22 Yes Tanna Schafer APRN   omeprazole (priLOSEC) 40 MG capsule Take 1 capsule by mouth Daily. 22  Yes Kayleen Castro MD   vitamin B-12 (CYANOCOBALAMIN) 1000 MCG tablet TAKE 1 TABLET BY MOUTH DAILY. 21  Yes Kayleen Castro MD   vitamin D (ERGOCALCIFEROL) 1.25 MG (52443 UT) capsule capsule TAKE 1 CAPSULE BY MOUTH 1 (ONE) TIME PER WEEK. 10/14/21  Yes Kayleen Castro MD   acetaZOLAMIDE (DIAMOX) 250 MG tablet  22  ProviderDivya MD   fluconazole (Diflucan) 150 MG tablet Take 1 tablet by mouth today and repeat in 4 days. 22  Tanna Schafer APRN     No Known Allergies  Social History     Socioeconomic History   • Marital status: Single   • Number of children: 2   Tobacco Use   • Smoking status: Former Smoker     Packs/day: 1.00     Years: 13.00     Pack years: 13.00     Types: Cigarettes     Start date: 10/13/2008     Quit date: 2019     Years since quittin.9   • Smokeless tobacco: Never Used   Substance and Sexual Activity   • Alcohol use: No   • Drug use: No   • Sexual activity: Yes     Partners: Male     Birth control/protection: Tubal ligation       Review of Systems  Review of Systems   Constitutional: Positive for fatigue. Negative for activity change, appetite change, chills, diaphoresis, fever and unexpected weight change.   HENT: Negative for congestion, sore throat and trouble swallowing.    Respiratory: Negative for cough and shortness of breath.    Cardiovascular: Negative for chest pain.   Gastrointestinal: Positive for abdominal distention, abdominal pain (bloating ), change in bowel habit (diarrhea yesterday ) and flatus.  "Negative for anal bleeding, anorexia, blood in stool, constipation, diarrhea, nausea, rectal pain and vomiting.   Musculoskeletal: Negative for arthralgias, joint swelling, myalgias and neck pain.   Skin: Negative for pallor.   Neurological: Negative for light-headedness, numbness and headaches.        /90 (BP Location: Left arm)   Pulse 90   Ht 162.6 cm (64\")   Wt 124 kg (274 lb)   LMP 08/31/2022 (Approximate)   BMI 47.03 kg/m²     Objective    Physical Exam  Constitutional:       General: She is not in acute distress.     Appearance: She is obese. She is not ill-appearing.   HENT:      Head: Normocephalic and atraumatic.   Pulmonary:      Effort: Pulmonary effort is normal.   Abdominal:      General: Abdomen is flat. Bowel sounds are normal. There is no distension.      Palpations: Abdomen is soft. There is no mass.      Tenderness: There is generalized abdominal tenderness.   Neurological:      Mental Status: She is alert.       Admission on 08/21/2022, Discharged on 08/21/2022   Component Date Value Ref Range Status   • SARS Antigen 08/21/2022 Not Detected  Not Detected, Presumptive Negative Final   • Internal Control 08/21/2022 Passed  Passed Final   • Lot Number 08/21/2022 [id number]   Final   • Expiration Date 08/21/2022 01/01/2023   Final     Assessment & Plan      1. Liver lesion, right lobe    2. Generalized abdominal pain    3. Irritable bowel syndrome with diarrhea    4. Gastroesophageal reflux disease with esophagitis without hemorrhage    .   Plan MRI liver to ensure stability of lesion on right lobe of liver.  Recheck liver enzymes today.  We will start patient on Xifaxan 550 mg 3 times a day for flare of irritable bowel syndrome diarrhea predominant.  To new PPI daily for chronic GERD discussed planning repeat EGD pending results of the above.  Orders placed during this encounter include:  Orders Placed This Encounter   Procedures   • MRI Abdomen With & Without Contrast     Standing Status: "   Future     Standing Expiration Date:   9/21/2023     Order Specific Question:   Patient Pregnant     Answer:   No   • Hepatic Function Panel     Order Specific Question:   Release to patient     Answer:   Routine Release       * Surgery not found *    Review and/or summary of lab tests, radiology, procedures, medications. Review and summary of old records and obtaining of history. The risks and benefits of my recommendations, as well as other treatment options were discussed with the patient today. Questions were answered.    New Medications Ordered This Visit   Medications   • riFAXIMin (Xifaxan) 550 MG tablet     Sig: Take 1 tablet by mouth 3 (Three) Times a Day.     Dispense:  42 tablet     Refill:  1   • omeprazole (priLOSEC) 40 MG capsule     Sig: Take 1 capsule by mouth Daily.     Dispense:  90 capsule     Refill:  1       Follow-up: Return in about 4 weeks (around 10/19/2022) for Recheck, After test.          This document has been electronically signed by PETTY Marshall on September 21, 2022 11:03 CDT        I spent 23 minutes caring for Marleny on this date of service. This time includes time spent by me in the following activities:preparing for the visit, reviewing tests, obtaining and/or reviewing a separately obtained history, performing a medically appropriate examination and/or evaluation , counseling and educating the patient/family/caregiver, ordering medications, tests, or procedures, referring and communicating with other health care professionals , documenting information in the medical record and care coordination    Results for orders placed or performed during the hospital encounter of 08/21/22   POCT SARS-CoV-2 Antigen JOSEPH   ([facility])    Specimen: Swab   Result Value Ref Range    SARS Antigen Not Detected Not Detected, Presumptive Negative    Internal Control Passed Passed    Lot Number [id number]     Expiration Date 01/01/2023    Results for orders placed or performed  in visit on 22   Testosterone, F Eqlib & T LC / MS    Specimen: Blood   Result Value Ref Range    Testosterone, Total 76.4 (H) 10.0 - 55.0 ng/dL    Testosterone, Free      Testosterone, Free %     Sex Horm Binding Globulin    Specimen: Blood   Result Value Ref Range    Sex Hormone Binding Globulin 64.2 24.6 - 122.0 nmol/L   Progesterone    Specimen: Blood   Result Value Ref Range    Progesterone 2.35 ng/mL   Insulin, Total    Specimen: Blood   Result Value Ref Range    Insulin 26.0 (H) 2.6 - 24.9 uIU/mL   Estradiol    Specimen: Blood   Result Value Ref Range    Estradiol 102.0 pg/mL   TSH    Specimen: Blood   Result Value Ref Range    TSH 1.890 0.270 - 4.200 uIU/mL   Hemoglobin A1c    Specimen: Blood   Result Value Ref Range    Hemoglobin A1C 5.40 4.80 - 5.60 %   Luteinizing Hormone    Specimen: Blood   Result Value Ref Range    LH 9.20 mIU/mL   Follicle Stimulating Hormone    Specimen: Blood   Result Value Ref Range    FSH 3.56 mIU/mL   Lipid Panel    Specimen: Blood   Result Value Ref Range    Total Cholesterol 162 0 - 200 mg/dL    Triglycerides 76 0 - 150 mg/dL    HDL Cholesterol 48 40 - 60 mg/dL    LDL Cholesterol  99 0 - 100 mg/dL    VLDL Cholesterol 15 5 - 40 mg/dL    LDL/HDL Ratio 2.06    Results for orders placed or performed in visit on 22   LIQUID-BASED PAP SMEAR, [facility] ([facility])    Specimen: Cervix; ThinPrep Vial   Result Value Ref Range    Reference Lab Report       [facility]  [address] [place] [address]  Phone: [phone] or [phone]  Fax: [phone]  Pedro Razo M.D., Medical Director    PATIENT NAME                                     LABORATORY NO.  [id number]   PRIYA ESTEBAN.                            [id number]  [id number]                                 AGE                    SEX   SSN              CLIENT REF #  [facility]             27        1995      F     [id number]      [id number]    [address]                           REQUESTING M.D.           ATTENDING [place] [address]                     MAGDALENO MULLINS  DATE COLLECTED            DATE RECEIVED          DATE REPORTED  07/26/2022 07/26/2022 07/29/2022    Cytology Thin Prep    DIAGNOSIS:  Negative for intraepithelial lesion or malignancy    Multiple factors can influence accuracy of Pap tests; therefore, screening at  regular intervals is  necessary for early cancer detection.      SPECIMEN ADEQUACY:  SATISFACTORY FOR EVALUATION  Transformation zone is present.  SOURCE OF SPECIMEN:       CERVICAL  SLIDES:  1  CLINICAL HISTORY:  Encounter for Papanicolaou smear for cervical cancer screening  Vaginal discharge, odor    Chlamydia / Gonorrhea  CHLAMYDIA TRACHOMATIS: Negative  NEISSERIA GONORRHOEAE: Negative    The Aptima Combo 2 assay is a target amplification nucleic acid probe test that  utilizes target capture for the in vitro qualitative detection and differentiation of  ribosomal RNA from Chlamydia trachomatis and Neisseria gonorrhoeae to aid  in the diagnosis of chlamdial and gonococcal disease using the Heyo system.    CYTOTECHNOLOGIST:             ROSIE GOLDMAN (ASCP)    CPT CODES:  54569, 79976, 61822     Gardnerella vaginalis, Trichomonas vaginalis, Candida albicans, DNA - Swab, Vagina    Specimen: Vagina; Swab   Result Value Ref Range    CANDIDA ALBICANS Negative Negative    GARDNERELLA VAGINALIS Negative Negative    TRICHOMONAS VAGINALIS Negative Negative   Results for orders placed or performed in visit on 04/15/22   Holter Monitor - 72 Hour Up To 15 Days   Result Value Ref Range    Target HR (85%) 163 bpm    Max. Pred. HR (100%) 192 bpm   Results for orders placed or performed in visit on 03/11/22   Testosterone, Total, Women, Children, and Hypogonadal Males    Specimen: Blood   Result Value Ref Range    Testosterone, Total 44.8 10.0 - 55.0 ng/dL   CBC Auto Differential     Specimen: Blood   Result Value Ref Range    WBC 8.63 3.40 - 10.80 10*3/mm3    RBC 4.57 3.77 - 5.28 10*6/mm3    Hemoglobin 13.8 12.0 - 15.9 g/dL    Hematocrit 40.0 34.0 - 46.6 %    MCV 87.5 79.0 - 97.0 fL    MCH 30.2 26.6 - 33.0 pg    MCHC 34.5 31.5 - 35.7 g/dL    RDW 12.0 (L) 12.3 - 15.4 %    RDW-SD 37.8 37.0 - 54.0 fl    MPV 10.8 6.0 - 12.0 fL    Platelets 276 140 - 450 10*3/mm3    Neutrophil % 57.5 42.7 - 76.0 %    Lymphocyte % 33.8 19.6 - 45.3 %    Monocyte % 4.9 (L) 5.0 - 12.0 %    Eosinophil % 3.0 0.3 - 6.2 %    Basophil % 0.6 0.0 - 1.5 %    Immature Grans % 0.2 0.0 - 0.5 %    Neutrophils, Absolute 4.96 1.70 - 7.00 10*3/mm3    Lymphocytes, Absolute 2.92 0.70 - 3.10 10*3/mm3    Monocytes, Absolute 0.42 0.10 - 0.90 10*3/mm3    Eosinophils, Absolute 0.26 0.00 - 0.40 10*3/mm3    Basophils, Absolute 0.05 0.00 - 0.20 10*3/mm3    Immature Grans, Absolute 0.02 0.00 - 0.05 10*3/mm3    nRBC 0.0 0.0 - 0.2 /100 WBC   Insulin, Free & Total, Serum    Specimen: Blood   Result Value Ref Range    Insulin, Free 16 uU/mL    Insulin 16 uU/mL   Lipid Panel    Specimen: Blood   Result Value Ref Range    Total Cholesterol 156 0 - 200 mg/dL    Triglycerides 71 0 - 150 mg/dL    HDL Cholesterol 48 40 - 60 mg/dL    LDL Cholesterol  94 0 - 100 mg/dL    VLDL Cholesterol 14 5 - 40 mg/dL    LDL/HDL Ratio 1.95    Comprehensive Metabolic Panel    Specimen: Blood   Result Value Ref Range    Glucose 91 65 - 99 mg/dL    BUN 10 6 - 20 mg/dL    Creatinine 0.75 0.57 - 1.00 mg/dL    Sodium 138 136 - 145 mmol/L    Potassium 4.3 3.5 - 5.2 mmol/L    Chloride 105 98 - 107 mmol/L    CO2 23.5 22.0 - 29.0 mmol/L    Calcium 9.4 8.6 - 10.5 mg/dL    Total Protein 7.2 6.0 - 8.5 g/dL    Albumin 4.50 3.50 - 5.20 g/dL    ALT (SGPT) 36 (H) 1 - 33 U/L    AST (SGOT) 23 1 - 32 U/L    Alkaline Phosphatase 93 39 - 117 U/L    Total Bilirubin 0.2 0.0 - 1.2 mg/dL    Globulin 2.7 gm/dL    A/G Ratio 1.7 g/dL    BUN/Creatinine Ratio 13.3 7.0 - 25.0    Anion Gap 9.5 5.0 - 15.0  mmol/L    eGFR 111.4 >60.0 mL/min/1.73   Results for orders placed or performed during the hospital encounter of 01/24/22   POCT SARS-CoV-2 Antigen JOSEPH   ([facility])    Specimen: Swab   Result Value Ref Range    SARS Antigen Detected (A) Not Detected    Internal Control Passed Passed    Lot Number [id number]     Expiration Date 08/20/2023    Results for orders placed or performed in visit on 08/20/21   ECG 12 Lead   Result Value Ref Range    QT Interval 398 ms    QTC Interval 413 ms   Results for orders placed or performed in visit on 05/07/21   POCT pregnancy, urine    Specimen: Urine   Result Value Ref Range    HCG, Urine, QL Negative Negative    Lot Number [id number]     Internal Positive Control NA     Internal Negative Control NA    Results for orders placed or performed in visit on 04/09/21   SOWMYA by IFA, Reflex 9-biomarkers profile    Specimen: Blood   Result Value Ref Range    SOWMYA Negative     Please note Comment    CBC Auto Differential    Specimen: Blood   Result Value Ref Range    WBC 10.15 3.40 - 10.80 10*3/mm3    RBC 4.70 3.77 - 5.28 10*6/mm3    Hemoglobin 14.1 12.0 - 15.9 g/dL    Hematocrit 42.6 34.0 - 46.6 %    MCV 90.6 79.0 - 97.0 fL    MCH 30.0 26.6 - 33.0 pg    MCHC 33.1 31.5 - 35.7 g/dL    RDW 12.2 (L) 12.3 - 15.4 %    RDW-SD 39.9 37.0 - 54.0 fl    MPV 11.2 6.0 - 12.0 fL    Platelets 285 140 - 450 10*3/mm3    Neutrophil % 57.5 42.7 - 76.0 %    Lymphocyte % 33.8 19.6 - 45.3 %    Monocyte % 4.7 (L) 5.0 - 12.0 %    Eosinophil % 3.3 0.3 - 6.2 %    Basophil % 0.3 0.0 - 1.5 %    Immature Grans % 0.4 0.0 - 0.5 %    Neutrophils, Absolute 5.84 1.70 - 7.00 10*3/mm3    Lymphocytes, Absolute 3.43 (H) 0.70 - 3.10 10*3/mm3    Monocytes, Absolute 0.48 0.10 - 0.90 10*3/mm3    Eosinophils, Absolute 0.33 0.00 - 0.40 10*3/mm3    Basophils, Absolute 0.03 0.00 - 0.20 10*3/mm3    Immature Grans, Absolute 0.04 0.00 - 0.05 10*3/mm3    nRBC 0.0 0.0 - 0.2 /100 WBC     *Note: Due to a large number of results  and/or encounters for the requested time period, some results have not been displayed. A complete set of results can be found in Results Review.

## 2022-09-26 ENCOUNTER — HOSPITAL ENCOUNTER (OUTPATIENT)
Dept: MRI IMAGING | Facility: HOSPITAL | Age: 27
Discharge: HOME | End: 2022-09-26
Admitting: NURSE PRACTITIONER

## 2022-09-26 DIAGNOSIS — R10.84 GENERALIZED ABDOMINAL PAIN: ICD-10-CM

## 2022-09-26 DIAGNOSIS — K76.9 LIVER LESION, RIGHT LOBE: ICD-10-CM

## 2022-09-26 PROCEDURE — 25010000002 GADOTERIDOL PER 1 ML: Performed by: NURSE PRACTITIONER

## 2022-09-26 PROCEDURE — A9579 GAD-BASE MR CONTRAST NOS,1ML: HCPCS | Performed by: NURSE PRACTITIONER

## 2022-09-26 PROCEDURE — 74183 MRI ABD W/O CNTR FLWD CNTR: CPT

## 2022-09-26 RX ADMIN — GADOTERIDOL 20 ML: 279.3 INJECTION, SOLUTION INTRAVENOUS at 08:42

## 2022-09-30 ENCOUNTER — TELEPHONE (OUTPATIENT)
Dept: GASTROENTEROLOGY | Facility: CLINIC | Age: 27
End: 2022-09-30

## 2022-09-30 NOTE — TELEPHONE ENCOUNTER
Relayed results to patient who voiced understanding.       ----- Message from PETTY Burrows sent at 9/29/2022  6:56 PM CDT -----  Please call patient with results. Benign hemangioma found. Will not need imaging or to be followed. Follow up as planned

## 2022-09-30 NOTE — TELEPHONE ENCOUNTER
Attempted to call there was no answer and no voicemail was available         ----- Message from PETTY Burrows sent at 9/29/2022  6:56 PM CDT -----  Please call patient with results. Benign hemangioma found. Will not need imaging or to be followed. Follow up as planned

## 2022-10-08 ENCOUNTER — HOSPITAL ENCOUNTER (EMERGENCY)
Facility: HOSPITAL | Age: 27
Discharge: HOME | End: 2022-10-08
Attending: FAMILY MEDICINE | Admitting: FAMILY MEDICINE

## 2022-10-08 VITALS
HEART RATE: 89 BPM | WEIGHT: 278 LBS | TEMPERATURE: 98.5 F | RESPIRATION RATE: 18 BRPM | OXYGEN SATURATION: 99 % | DIASTOLIC BLOOD PRESSURE: 78 MMHG | SYSTOLIC BLOOD PRESSURE: 124 MMHG | HEIGHT: 64 IN | BODY MASS INDEX: 47.46 KG/M2

## 2022-10-08 DIAGNOSIS — G89.29 CHRONIC NONINTRACTABLE HEADACHE, UNSPECIFIED HEADACHE TYPE: Primary | ICD-10-CM

## 2022-10-08 DIAGNOSIS — R51.9 CHRONIC NONINTRACTABLE HEADACHE, UNSPECIFIED HEADACHE TYPE: Primary | ICD-10-CM

## 2022-10-08 LAB
HOLD SPECIMEN: NORMAL
HOLD SPECIMEN: NORMAL
WHOLE BLOOD HOLD COAG: NORMAL
WHOLE BLOOD HOLD SPECIMEN: NORMAL

## 2022-10-08 PROCEDURE — 25010000002 DEXAMETHASONE PER 1 MG: Performed by: FAMILY MEDICINE

## 2022-10-08 PROCEDURE — 96374 THER/PROPH/DIAG INJ IV PUSH: CPT

## 2022-10-08 PROCEDURE — 25010000002 KETOROLAC TROMETHAMINE PER 15 MG: Performed by: FAMILY MEDICINE

## 2022-10-08 PROCEDURE — 96375 TX/PRO/DX INJ NEW DRUG ADDON: CPT

## 2022-10-08 PROCEDURE — 99282 EMERGENCY DEPT VISIT SF MDM: CPT

## 2022-10-08 RX ORDER — BUTALBITAL, ACETAMINOPHEN AND CAFFEINE 50; 325; 40 MG/1; MG/1; MG/1
1 TABLET ORAL EVERY 6 HOURS PRN
Qty: 15 TABLET | Refills: 0 | OUTPATIENT
Start: 2022-10-08 | End: 2022-11-13

## 2022-10-08 RX ORDER — KETOROLAC TROMETHAMINE 15 MG/ML
15 INJECTION, SOLUTION INTRAMUSCULAR; INTRAVENOUS ONCE
Status: COMPLETED | OUTPATIENT
Start: 2022-10-08 | End: 2022-10-08

## 2022-10-08 RX ORDER — DEXAMETHASONE SODIUM PHOSPHATE 4 MG/ML
10 INJECTION, SOLUTION INTRA-ARTICULAR; INTRALESIONAL; INTRAMUSCULAR; INTRAVENOUS; SOFT TISSUE EVERY 6 HOURS
Status: DISCONTINUED | OUTPATIENT
Start: 2022-10-08 | End: 2022-10-08 | Stop reason: HOSPADM

## 2022-10-08 RX ORDER — FUROSEMIDE 20 MG/1
20 TABLET ORAL DAILY
Qty: 20 TABLET | Refills: 0 | OUTPATIENT
Start: 2022-10-08 | End: 2022-11-13

## 2022-10-08 RX ORDER — SODIUM CHLORIDE 0.9 % (FLUSH) 0.9 %
10 SYRINGE (ML) INJECTION AS NEEDED
Status: DISCONTINUED | OUTPATIENT
Start: 2022-10-08 | End: 2022-10-08 | Stop reason: HOSPADM

## 2022-10-08 RX ADMIN — Medication 10 ML: at 18:55

## 2022-10-08 RX ADMIN — KETOROLAC TROMETHAMINE 15 MG: 15 INJECTION, SOLUTION INTRAMUSCULAR; INTRAVENOUS at 20:07

## 2022-10-08 RX ADMIN — DEXAMETHASONE SODIUM PHOSPHATE 10 MG: 4 INJECTION, SOLUTION INTRAMUSCULAR; INTRAVENOUS at 20:07

## 2022-10-08 NOTE — ED NOTES
Patient reports Eye provider told her Nerves swollen behind eyes in may, Seen at [facility] in June or July took 12 tubes of fluid from spine. Reports they said it may return.

## 2022-10-09 NOTE — ED PROVIDER NOTES
"Subjective   History of Present Illness  Patient presents emergency department with a headache that has been present since 8 AM this morning.  She admits to roughly 6-7 headaches daily.  She has had headaches for the past several years and has been seen by the neurologist, Dr. Taylor, in [place] and states that she has an appointment to see another neurologist on 10/20/2022 in [place].  She is also been experiencing eye pain that she states is secondary to increased spinal fluid and has had cerebrospinal fluid drained in the past.  She has seen Dr. Arambula, her optometrist and states that she has an appointment with her ophthalmologist on 10/19/2022.  She is also been experiencing intermittent left facial numbness for the past 2 years.  She takes over-the-counter medications for her headaches which usually help.  She also mentions that she was given a prescription for a \"water pill\" but her insurance would not pay for it.      Headache  Quality:  Sharp  Timing:  Constant  Progression:  Waxing and waning  Chronicity:  Chronic  Relieved by:  Nothing  Associated symptoms: eye pain    Associated symptoms: no abdominal pain, no congestion, no cough, no diarrhea, no dizziness, no ear pain, no fatigue, no fever, no myalgias, no nausea, no neck pain, no seizures, no sinus pressure, no sore throat, no vomiting and no weakness    Eye Pain  Associated symptoms: headaches    Associated symptoms: no abdominal pain, no chest pain, no congestion, no cough, no diarrhea, no ear pain, no fatigue, no fever, no myalgias, no nausea, no rash, no rhinorrhea, no shortness of breath, no sore throat, no vomiting and no wheezing        Review of Systems   Constitutional: Negative for appetite change, chills, diaphoresis, fatigue and fever.   HENT: Negative for congestion, ear discharge, ear pain, nosebleeds, rhinorrhea, sinus pressure, sore throat and trouble swallowing.    Eyes: Positive for pain. Negative for discharge and redness. "   Respiratory: Negative for apnea, cough, chest tightness, shortness of breath and wheezing.    Cardiovascular: Negative for chest pain.   Gastrointestinal: Negative for abdominal pain, diarrhea, nausea and vomiting.   Endocrine: Negative for polyuria.   Genitourinary: Negative for dysuria, frequency and urgency.   Musculoskeletal: Negative for myalgias and neck pain.   Skin: Negative for color change and rash.   Allergic/Immunologic: Negative for immunocompromised state.   Neurological: Positive for headaches. Negative for dizziness, seizures, syncope, weakness and light-headedness.   Hematological: Negative for adenopathy. Does not bruise/bleed easily.   Psychiatric/Behavioral: Negative for behavioral problems and confusion.   All other systems reviewed and are negative.      Past Medical History:   Diagnosis Date   • Acid reflux    • Allergic rhinitis    • Anxiety    • Anxiety    • Benign intracranial hypertension    • Bronchitis     probable   • Common cold    • Conjunctivitis    • Dysfunction of eustachian tube    • Examination of eyes and vision     general; NORMAL   • Gastroenteritis    • Headache    • Hemorrhoids     likely   • Infection of skin and subcutaneous tissue    • Migraine    • Nonvenomous insect bite    • Open wound of lower limb    • Pharyngitis    • Rhinitis    • Tick bite    • URI (upper respiratory infection)        No Known Allergies    Past Surgical History:   Procedure Laterality Date   • COLONOSCOPY N/A 1/15/2020    Procedure: COLONOSCOPY;  Surgeon: Sterling Neal MD;  Location: [facility] ENDOSCOPY;  Service: Gastroenterology   • ENDOSCOPY N/A 11/11/2019    Procedure: ESOPHAGOGASTRODUODENOSCOPY;  Surgeon: Sterling Neal MD;  Location: [facility] ENDOSCOPY;  Service: Gastroenterology   • SALPINGECTOMY Bilateral 8/7/2018    Procedure: SALPINGECTOMY LAPAROSCOPIC;  Surgeon: FridayElizabeth MD;  Location: [facility] OR;  Service: Obstetrics/Gynecology   • WISDOM TOOTH EXTRACTION         Family  History   Problem Relation Age of Onset   • Hypertension Brother    • Hypertension Paternal Grandmother    • Hypertension Maternal Grandmother    • Diabetes Maternal Grandmother    • No Known Problems Father    • No Known Problems Mother    • Heart attack Paternal Grandfather    • Hypertension Maternal Grandfather    • Sleep apnea Maternal Grandfather    • Heart disease Other    • Stroke Other    • Hypertension Other    • Diabetes Other    • Crohn's disease Other         Maternal Grandmother - Ana Rosa Santamaria   • Diverticulitis Other         Maternal Grandmother - Ana Rosa Santamaria   • Colon polyps Other         Maternal Grandmother - Ana Rosa Santamaria       Social History     Socioeconomic History   • Marital status: Single   • Number of children: 2   Tobacco Use   • Smoking status: Former     Packs/day: 1.00     Years: 13.00     Pack years: 13.00     Types: Cigarettes     Start date: 10/13/2008     Quit date: 9/30/2019     Years since quitting: 3.0   • Smokeless tobacco: Never   Substance and Sexual Activity   • Alcohol use: No   • Drug use: No   • Sexual activity: Yes     Partners: Male     Birth control/protection: Tubal ligation           Objective   Physical Exam  Vitals and nursing note reviewed.   Constitutional:       Appearance: She is well-developed.   HENT:      Head: Normocephalic and atraumatic.      Nose: Nose normal.   Eyes:      General: No scleral icterus.        Right eye: No discharge.         Left eye: No discharge.      Conjunctiva/sclera: Conjunctivae normal.      Pupils: Pupils are equal, round, and reactive to light.   Neck:      Trachea: No tracheal deviation.   Cardiovascular:      Rate and Rhythm: Normal rate and regular rhythm.      Heart sounds: Normal heart sounds. No murmur heard.  Pulmonary:      Effort: Pulmonary effort is normal. No respiratory distress.      Breath sounds: Normal breath sounds. No stridor. No wheezing or rales.   Abdominal:      General: Bowel sounds are normal. There is  no distension.      Palpations: Abdomen is soft. There is no mass.      Tenderness: There is no abdominal tenderness. There is no guarding or rebound.   Musculoskeletal:      Cervical back: Normal range of motion and neck supple.   Skin:     General: Skin is warm and dry.      Findings: No erythema or rash.   Neurological:      Mental Status: She is alert and oriented to person, place, and time.      Coordination: Coordination normal.   Psychiatric:         Behavior: Behavior normal.         Thought Content: Thought content normal.         Procedures           ED Course                                   AMA reviewed by Jared Ruffin MD       [facility]    Final diagnoses:   Chronic nonintractable headache, unspecified headache type       ED Disposition  ED Disposition     ED Disposition   Discharge    Condition   Stable    Comment   --             Kayleen Castro MD  [facility] [address]  [address]  [place] [id number] [phone]    In 2 days           Medication List      New Prescriptions    butalbital-acetaminophen-caffeine -40 MG per tablet  Commonly known as: FIORICET, ESGIC  Take 1 tablet by mouth Every 6 (Six) Hours As Needed for Headache.     furosemide 20 MG tablet  Commonly known as: LASIX  Take 1 tablet by mouth Daily.           Where to Get Your Medications      These medications were sent to [facility]/pharmacy #[id number] - [place], [place] - [address] [place] [address] - [phone]  - [phone] [address] [address]    Phone: [phone]   · butalbital-acetaminophen-caffeine -40 MG per tablet  · furosemide 20 MG tablet          Jared Ruffin MD  10/08/22 [id number]

## 2022-10-13 ENCOUNTER — OFFICE VISIT (OUTPATIENT)
Dept: GASTROENTEROLOGY | Facility: CLINIC | Age: 27
End: 2022-10-13

## 2022-10-13 ENCOUNTER — HOSPITAL ENCOUNTER (OUTPATIENT)
Facility: HOSPITAL | Age: 27
Setting detail: HOSPITAL OUTPATIENT SURGERY
End: 2022-10-13
Attending: INTERNAL MEDICINE | Admitting: INTERNAL MEDICINE

## 2022-10-13 VITALS
BODY MASS INDEX: 47.63 KG/M2 | WEIGHT: 279 LBS | DIASTOLIC BLOOD PRESSURE: 70 MMHG | HEART RATE: 86 BPM | HEIGHT: 64 IN | SYSTOLIC BLOOD PRESSURE: 137 MMHG

## 2022-10-13 DIAGNOSIS — K21.00 GASTROESOPHAGEAL REFLUX DISEASE WITH ESOPHAGITIS WITHOUT HEMORRHAGE: ICD-10-CM

## 2022-10-13 DIAGNOSIS — K76.0 FATTY LIVER: ICD-10-CM

## 2022-10-13 DIAGNOSIS — R10.13 EPIGASTRIC PAIN: ICD-10-CM

## 2022-10-13 DIAGNOSIS — R68.81 EARLY SATIETY: ICD-10-CM

## 2022-10-13 DIAGNOSIS — K58.0 IRRITABLE BOWEL SYNDROME WITH DIARRHEA: Primary | ICD-10-CM

## 2022-10-13 PROCEDURE — 99213 OFFICE O/P EST LOW 20 MIN: CPT | Performed by: NURSE PRACTITIONER

## 2022-10-13 RX ORDER — DICYCLOMINE HYDROCHLORIDE 10 MG/1
10 CAPSULE ORAL
Qty: 120 CAPSULE | Refills: 1 | Status: SHIPPED | OUTPATIENT
Start: 2022-10-13

## 2022-10-13 RX ORDER — DEXTROSE AND SODIUM CHLORIDE 5; .45 G/100ML; G/100ML
30 INJECTION, SOLUTION INTRAVENOUS CONTINUOUS PRN
Status: CANCELLED | OUTPATIENT
Start: 2022-11-28

## 2022-10-13 NOTE — PROGRESS NOTES
Chief Complaint   Patient presents with   • Gas   • Bloated       Subjective    Marleny Conn is a 27 y.o. female. she is here today for follow-up.                                                                  Assessment & Plan                                     1. Irritable bowel syndrome with diarrhea    2. Gastroesophageal reflux disease with esophagitis without hemorrhage    3. Fatty liver    4. Epigastric pain    5. Early satiety    1.  Start patient on Bentyl for irritable bowel syndrome diarrhea predominant encouraged her to avoid any known triggers.  2.  EGD due to worsening GERD epigastric pain early satiety will obtain biopsy rule out Chu's esophagus or H. pylori gastritis.  3.  Discussed importance of weight loss for fatty liver disease we will obtain Tyler FibroSure with fasting lab in 3 months.  MRI noted benign hemangioma and diffuse fatty infiltration of the liver    Follow-up: Return in about 4 weeks (around 11/10/2022) for Recheck, After test.     HPI  27-year-old female presents for follow-up after MRI to discuss worsening upper abdominal pain early satiety and epigastric pain.  Previously reflux was well controlled with omeprazole but symptoms have worsened recently she has been trying to work on diet portion control and states she initially lost 10 pounds but her weight is back up 5 pounds from December.  She is undergoing evaluation to see if she is candidate for bariatric surgery.  States something was found good to eat and once she makes it she will eat 2 or 3 bites and feel nauseated have belching and feeling knots in her stomach typically followed by some diarrhea bowel movements have not had a change they have alternated between constipation and diarrhea for some time she took course of Xifaxan without change in symptoms.  States in 2019 when she was given  "dicyclomine it did help with cramping and diarrhea.    MPRESSION:  1. Diffuse fatty infiltration of the liver.  2. In the right hepatic lobe is a small, 1.1 cm benign cavernous  hemangioma, corresponding to the abnormality noted on the prior,  10/25/2019 CT.      Review of Systems  Review of Systems   Constitutional: Positive for fatigue. Negative for activity change, appetite change, chills, diaphoresis, fever and unexpected weight change.   HENT: Negative for sore throat and trouble swallowing.    Respiratory: Negative for shortness of breath.    Gastrointestinal: Positive for abdominal distention (constant gas bloating ), abdominal pain (feels like stomach in knots ), constipation (bowel habits cycle between constipation to diarrhea ), diarrhea and nausea. Negative for anal bleeding, blood in stool, rectal pain and vomiting.   Musculoskeletal: Negative for arthralgias.   Skin: Negative for pallor.   Neurological: Negative for light-headedness.       /70 (BP Location: Left arm)   Pulse 86   Ht 162.6 cm (64\")   Wt 127 kg (279 lb)   LMP 10/03/2022 (Approximate)   BMI 47.89 kg/m²     Objective      Physical Exam  Constitutional:       General: She is not in acute distress.     Appearance: Normal appearance. She is normal weight. She is not ill-appearing.   HENT:      Head: Normocephalic and atraumatic.   Pulmonary:      Effort: Pulmonary effort is normal.   Abdominal:      General: Abdomen is flat. Bowel sounds are normal. There is no distension.      Palpations: Abdomen is soft. There is no mass.      Tenderness: There is abdominal tenderness in the right upper quadrant, epigastric area and left upper quadrant.   Neurological:      Mental Status: She is alert.               The following portions of the patient's history were reviewed and updated as appropriate:   Past Medical History:   Diagnosis Date   • Acid reflux    • Allergic rhinitis    • Anxiety    • Anxiety    • Benign intracranial hypertension  "   • Bronchitis     probable   • Common cold    • Conjunctivitis    • Dysfunction of eustachian tube    • Examination of eyes and vision     general; NORMAL   • Gastroenteritis    • Headache    • Hemorrhoids     likely   • Infection of skin and subcutaneous tissue    • Migraine    • Nonvenomous insect bite    • Open wound of lower limb    • Pharyngitis    • Rhinitis    • Tick bite    • URI (upper respiratory infection)      Past Surgical History:   Procedure Laterality Date   • COLONOSCOPY N/A 1/15/2020    Procedure: COLONOSCOPY;  Surgeon: Sterling Neal MD;  Location: [facility] ENDOSCOPY;  Service: Gastroenterology   • ENDOSCOPY N/A 2019    Procedure: ESOPHAGOGASTRODUODENOSCOPY;  Surgeon: Sterling Neal MD;  Location: [facility] ENDOSCOPY;  Service: Gastroenterology   • SALPINGECTOMY Bilateral 2018    Procedure: SALPINGECTOMY LAPAROSCOPIC;  Surgeon: FridayElizabeth MD;  Location: [facility] OR;  Service: Obstetrics/Gynecology   • WISDOM TOOTH EXTRACTION       Family History   Problem Relation Age of Onset   • Hypertension Brother    • Hypertension Paternal Grandmother    • Hypertension Maternal Grandmother    • Diabetes Maternal Grandmother    • No Known Problems Father    • No Known Problems Mother    • Heart attack Paternal Grandfather    • Hypertension Maternal Grandfather    • Sleep apnea Maternal Grandfather    • Heart disease Other    • Stroke Other    • Hypertension Other    • Diabetes Other    • Crohn's disease Other         Maternal Grandmother - Ana Rosa Santamaria   • Diverticulitis Other         Maternal Grandmother - Ana Rosa Santamaria   • Colon polyps Other         Maternal Grandmother - Ana Rosa Santamaria     OB History        2    Para   2    Term   2            AB        Living   2       SAB        IAB        Ectopic        Molar        Multiple   0    Live Births   2              No Known Allergies  Social History     Socioeconomic History   • Marital status: Single   • Number of children: 2    Tobacco Use   • Smoking status: Former     Packs/day: 1.00     Years: 13.00     Pack years: 13.00     Types: Cigarettes     Start date: 10/13/2008     Quit date: 9/30/2019     Years since quitting: 3.0   • Smokeless tobacco: Never   Substance and Sexual Activity   • Alcohol use: No   • Drug use: No   • Sexual activity: Yes     Partners: Male     Birth control/protection: Tubal ligation     Current Medications:  Prior to Admission medications    Medication Sig Start Date End Date Taking? Authorizing Provider   buPROPion XL (Wellbutrin XL) 300 MG 24 hr tablet Take 1 tablet by mouth Every Morning. 1/6/22  Yes Kayleen Castro MD   busPIRone (BUSPAR) 10 MG tablet Take 1 tablet by mouth 2 (Two) Times a Day. 3/9/22  Yes Kayleen Castro MD   butalbital-acetaminophen-caffeine (FIORICET, ESGIC) -40 MG per tablet Take 1 tablet by mouth Every 6 (Six) Hours As Needed for Headache. 10/8/22  Yes Jared Ruffin MD   cetirizine (zyrTEC) 10 MG tablet Take 10 mg by mouth Daily. 10/14/21  Yes Divya Dixon MD   diclofenac (VOLTAREN) 50 MG EC tablet Take 50 mg by mouth. 4/22/22  Yes Divya Dixon MD   Elastic Bandages & Supports (Aircast Sport Ankle Brace/Rght) misc Use for ankle support 4/22/22  Yes Kayleen Castro MD   FLUoxetine (PROzac) 40 MG capsule Take 1 capsule by mouth Daily. 4/22/22  Yes Kayleen Castro MD   furosemide (LASIX) 20 MG tablet Take 1 tablet by mouth Daily. 10/8/22  Yes Jared Ruffin MD   norgestimate-ethinyl estradiol (Tri-Sprintec) 0.18/0.215/0.25 MG-35 MCG per tablet Take 1 tablet by mouth Daily. 9/13/22 9/13/23 Yes Tanna Schafer APRN   omeprazole (priLOSEC) 40 MG capsule Take 1 capsule by mouth Daily. 9/21/22  Yes Lata Kenny APRN   vitamin B-12 (CYANOCOBALAMIN) 1000 MCG tablet TAKE 1 TABLET BY MOUTH DAILY. 7/13/21  Yes Kayleen Castro MD   vitamin D (ERGOCALCIFEROL) 1.25 MG (40873 UT) capsule capsule TAKE 1 CAPSULE BY MOUTH 1 (ONE) TIME PER WEEK.  10/14/21  Yes Kayleen Castro MD   riFAXIMin (Xifaxan) 550 MG tablet Take 1 tablet by mouth 3 (Three) Times a Day. 9/21/22 10/13/22 Yes Lata Kenny APRN     Orders placed during this encounter include:  Orders Placed This Encounter   Procedures   • Follow Anesthesia Guidelines / Standing Orders     Standing Status:   Future   • Obtain Informed Consent     Standing Status:   Future     Order Specific Question:   Informed Consent Given For     Answer:   ESOPHAGOGASTRODUODENOSCOPY possible dilation     ESOPHAGOGASTRODUODENOSCOPY possible dilation (N/A)  New Medications Ordered This Visit   Medications   • dicyclomine (Bentyl) 10 MG capsule     Sig: Take 1 capsule by mouth 4 (Four) Times a Day Before Meals & at Bedtime.     Dispense:  120 capsule     Refill:  1         Review and/or summary of lab tests, radiology, procedures, medications. Review and summary of old records and obtaining of history. The risks and benefits of my recommendations, as well as other treatment options were discussed . Any questions/concerned were answered. Patient voiced understanding and agreement.          This document has been electronically signed by PETTY Marshall on October 13, 2022 10:59 CDT                                               Results for orders placed or performed during the hospital encounter of 10/08/22   Gold Top - SST   Result Value Ref Range    Extra Tube Hold for add-ons.    Green Top (Gel)   Result Value Ref Range    Extra Tube Hold for add-ons.    Lavender Top   Result Value Ref Range    Extra Tube hold for add-on    Light Blue Top   Result Value Ref Range    Extra Tube Hold for add-ons.    Results for orders placed or performed during the hospital encounter of 08/21/22   POCT SARS-CoV-2 Antigen [facility]   ([facility])    Specimen: Swab   Result Value Ref Range    SARS Antigen Not Detected Not Detected, Presumptive Negative    Internal Control Passed Passed    Lot Number [id number]     Expiration  Date 2023    Results for orders placed or performed in visit on 22   Testosterone, F Eqlib & T LC / MS    Specimen: Blood   Result Value Ref Range    Testosterone, Total 76.4 (H) 10.0 - 55.0 ng/dL    Testosterone, Free      Testosterone, Free %     Sex Horm Binding Globulin    Specimen: Blood   Result Value Ref Range    Sex Hormone Binding Globulin 64.2 24.6 - 122.0 nmol/L   Progesterone    Specimen: Blood   Result Value Ref Range    Progesterone 2.35 ng/mL   Insulin, Total    Specimen: Blood   Result Value Ref Range    Insulin 26.0 (H) 2.6 - 24.9 uIU/mL   Estradiol    Specimen: Blood   Result Value Ref Range    Estradiol 102.0 pg/mL   TSH    Specimen: Blood   Result Value Ref Range    TSH 1.890 0.270 - 4.200 uIU/mL   Hemoglobin A1c    Specimen: Blood   Result Value Ref Range    Hemoglobin A1C 5.40 4.80 - 5.60 %   Luteinizing Hormone    Specimen: Blood   Result Value Ref Range    LH 9.20 mIU/mL   Follicle Stimulating Hormone    Specimen: Blood   Result Value Ref Range    FSH 3.56 mIU/mL   Lipid Panel    Specimen: Blood   Result Value Ref Range    Total Cholesterol 162 0 - 200 mg/dL    Triglycerides 76 0 - 150 mg/dL    HDL Cholesterol 48 40 - 60 mg/dL    LDL Cholesterol  99 0 - 100 mg/dL    VLDL Cholesterol 15 5 - 40 mg/dL    LDL/HDL Ratio 2.06    Results for orders placed or performed in visit on 22   LIQUID-BASED PAP SMEAR, [facility] ([facility])    Specimen: Cervix; ThinPrep Vial   Result Value Ref Range    Reference Lab Report       [facility]  [address] [place] [address]  Phone: [phone] or [phone]  Fax: [phone]  Pedro Razo M.D., Medical Director    PATIENT NAME                                     LABORATORY NO.  [id number]   PRIYA ESTEBAN.                            [id number]  [id number]                                 AGE                    SEX   SSN              CLIENT REF #  [facility]ARE                      1995           [id number]      [id number]    [address]                           REQUESTING M.D.           ATTENDING [place] [address]                     MAGDALENO MULLINS  DATE COLLECTED            DATE RECEIVED          DATE REPORTED  07/26/2022 07/26/2022 07/29/2022    Cytology Thin Prep    DIAGNOSIS:  Negative for intraepithelial lesion or malignancy    Multiple factors can influence accuracy of Pap tests; therefore, screening at  regular intervals is  necessary for early cancer detection.      SPECIMEN ADEQUACY:  SATISFACTORY FOR EVALUATION  Transformation zone is present.  SOURCE OF SPECIMEN:       CERVICAL  SLIDES:  1  CLINICAL HISTORY:  Encounter for Papanicolaou smear for cervical cancer screening  Vaginal discharge, odor    Chlamydia / Gonorrhea  CHLAMYDIA TRACHOMATIS: Negative  NEISSERIA GONORRHOEAE: Negative    The Aptima Combo 2 assay is a target amplification nucleic acid probe test that  utilizes target capture for the in vitro qualitative detection and differentiation of  ribosomal RNA from Chlamydia trachomatis and Neisseria gonorrhoeae to aid  in the diagnosis of chlamdial and gonococcal disease using the Sterling system.    CYTOTECHNOLOGIST:             ROSIE GOLDMAN (ASCP)    CPT CODES:  06124, 94808, 75839     Gardnerella vaginalis, Trichomonas vaginalis, Candida albicans, DNA - Swab, Vagina    Specimen: Vagina; Swab   Result Value Ref Range    CANDIDA ALBICANS Negative Negative    GARDNERELLA VAGINALIS Negative Negative    TRICHOMONAS VAGINALIS Negative Negative   Results for orders placed or performed in visit on 04/15/22   Holter Monitor - 72 Hour Up To 15 Days   Result Value Ref Range    Target HR (85%) 163 bpm    Max. Pred. HR (100%) 192 bpm   Results for orders placed or performed in visit on 03/11/22   Testosterone, Total, Women, Children, and Hypogonadal Males    Specimen: Blood   Result Value Ref Range    Testosterone, Total  44.8 10.0 - 55.0 ng/dL   CBC Auto Differential    Specimen: Blood   Result Value Ref Range    WBC 8.63 3.40 - 10.80 10*3/mm3    RBC 4.57 3.77 - 5.28 10*6/mm3    Hemoglobin 13.8 12.0 - 15.9 g/dL    Hematocrit 40.0 34.0 - 46.6 %    MCV 87.5 79.0 - 97.0 fL    MCH 30.2 26.6 - 33.0 pg    MCHC 34.5 31.5 - 35.7 g/dL    RDW 12.0 (L) 12.3 - 15.4 %    RDW-SD 37.8 37.0 - 54.0 fl    MPV 10.8 6.0 - 12.0 fL    Platelets 276 140 - 450 10*3/mm3    Neutrophil % 57.5 42.7 - 76.0 %    Lymphocyte % 33.8 19.6 - 45.3 %    Monocyte % 4.9 (L) 5.0 - 12.0 %    Eosinophil % 3.0 0.3 - 6.2 %    Basophil % 0.6 0.0 - 1.5 %    Immature Grans % 0.2 0.0 - 0.5 %    Neutrophils, Absolute 4.96 1.70 - 7.00 10*3/mm3    Lymphocytes, Absolute 2.92 0.70 - 3.10 10*3/mm3    Monocytes, Absolute 0.42 0.10 - 0.90 10*3/mm3    Eosinophils, Absolute 0.26 0.00 - 0.40 10*3/mm3    Basophils, Absolute 0.05 0.00 - 0.20 10*3/mm3    Immature Grans, Absolute 0.02 0.00 - 0.05 10*3/mm3    nRBC 0.0 0.0 - 0.2 /100 WBC   Insulin, Free & Total, Serum    Specimen: Blood   Result Value Ref Range    Insulin, Free 16 uU/mL    Insulin 16 uU/mL   Lipid Panel    Specimen: Blood   Result Value Ref Range    Total Cholesterol 156 0 - 200 mg/dL    Triglycerides 71 0 - 150 mg/dL    HDL Cholesterol 48 40 - 60 mg/dL    LDL Cholesterol  94 0 - 100 mg/dL    VLDL Cholesterol 14 5 - 40 mg/dL    LDL/HDL Ratio 1.95    Comprehensive Metabolic Panel    Specimen: Blood   Result Value Ref Range    Glucose 91 65 - 99 mg/dL    BUN 10 6 - 20 mg/dL    Creatinine 0.75 0.57 - 1.00 mg/dL    Sodium 138 136 - 145 mmol/L    Potassium 4.3 3.5 - 5.2 mmol/L    Chloride 105 98 - 107 mmol/L    CO2 23.5 22.0 - 29.0 mmol/L    Calcium 9.4 8.6 - 10.5 mg/dL    Total Protein 7.2 6.0 - 8.5 g/dL    Albumin 4.50 3.50 - 5.20 g/dL    ALT (SGPT) 36 (H) 1 - 33 U/L    AST (SGOT) 23 1 - 32 U/L    Alkaline Phosphatase 93 39 - 117 U/L    Total Bilirubin 0.2 0.0 - 1.2 mg/dL    Globulin 2.7 gm/dL    A/G Ratio 1.7 g/dL    BUN/Creatinine  Ratio 13.3 7.0 - 25.0    Anion Gap 9.5 5.0 - 15.0 mmol/L    eGFR 111.4 >60.0 mL/min/1.73   Results for orders placed or performed during the hospital encounter of 01/24/22   POCT SARS-CoV-2 Antigen [facility]   ([facility])    Specimen: Swab   Result Value Ref Range    SARS Antigen Detected (A) Not Detected    Internal Control Passed Passed    Lot Number [id number]     Expiration Date 08/20/2023    Results for orders placed or performed in visit on 08/20/21   ECG 12 Lead   Result Value Ref Range    QT Interval 398 ms    QTC Interval 413 ms   Results for orders placed or performed in visit on 05/07/21   POCT pregnancy, urine    Specimen: Urine   Result Value Ref Range    HCG, Urine, QL Negative Negative    Lot Number [id number]     Internal Positive Control NA     Internal Negative Control NA    Results for orders placed or performed in visit on 04/09/21   SOWMYA by IFA, Reflex 9-biomarkers profile    Specimen: Blood   Result Value Ref Range    SOWMYA Negative     Please note Comment    CBC Auto Differential    Specimen: Blood   Result Value Ref Range    WBC 10.15 3.40 - 10.80 10*3/mm3    RBC 4.70 3.77 - 5.28 10*6/mm3    Hemoglobin 14.1 12.0 - 15.9 g/dL    Hematocrit 42.6 34.0 - 46.6 %    MCV 90.6 79.0 - 97.0 fL    MCH 30.0 26.6 - 33.0 pg    MCHC 33.1 31.5 - 35.7 g/dL    RDW 12.2 (L) 12.3 - 15.4 %    RDW-SD 39.9 37.0 - 54.0 fl    MPV 11.2 6.0 - 12.0 fL    Platelets 285 140 - 450 10*3/mm3    Neutrophil % 57.5 42.7 - 76.0 %    Lymphocyte % 33.8 19.6 - 45.3 %    Monocyte % 4.7 (L) 5.0 - 12.0 %    Eosinophil % 3.3 0.3 - 6.2 %    Basophil % 0.3 0.0 - 1.5 %    Immature Grans % 0.4 0.0 - 0.5 %    Neutrophils, Absolute 5.84 1.70 - 7.00 10*3/mm3    Lymphocytes, Absolute 3.43 (H) 0.70 - 3.10 10*3/mm3    Monocytes, Absolute 0.48 0.10 - 0.90 10*3/mm3    Eosinophils, Absolute 0.33 0.00 - 0.40 10*3/mm3    Basophils, Absolute 0.03 0.00 - 0.20 10*3/mm3    Immature Grans, Absolute 0.04 0.00 - 0.05 10*3/mm3    nRBC 0.0 0.0 - 0.2 /100  WBC     *Note: Due to a large number of results and/or encounters for the requested time period, some results have not been displayed. A complete set of results can be found in Results Review.

## 2022-10-19 ENCOUNTER — TELEPHONE (OUTPATIENT)
Dept: FAMILY MEDICINE CLINIC | Facility: CLINIC | Age: 27
End: 2022-10-19

## 2022-10-19 NOTE — TELEPHONE ENCOUNTER
Patient called stating she had an appointment today @ 11:30 but she was not going to be able to make it. Patient is wanting to know if Dr. Castro could see her anytime this afternoon or if she could write a letter for her for work about her medical condition. Please call [phone]

## 2022-10-21 ENCOUNTER — TELEPHONE (OUTPATIENT)
Dept: FAMILY MEDICINE CLINIC | Facility: CLINIC | Age: 27
End: 2022-10-21

## 2022-10-21 NOTE — TELEPHONE ENCOUNTER
Pt called back about the message left and says needs to see Dr sellers as she needs about the fluid built up behind her eyes and needs a letter for work . Says she has been trying to get an appt as they told her to do a follow up after her er visit but could not get in .

## 2022-10-24 NOTE — TELEPHONE ENCOUNTER
Keep appointment on 11/09/2022.  What kind of letter does she need for work?  Has she seen the eye doctor and/or neurologist since last visit?  Thanks, DENNIS Castro

## 2022-10-24 NOTE — TELEPHONE ENCOUNTER
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  She said he is basically dead weight.     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     She also needs the letter to state that she does not need to work over 36 hours in 1 week. She states when she does she has pressure in her head and eyes when she works over 36 hours a week.     She states she will be seeing her eye dr on 11/07/2022 Dr. Taco Curtis and has an appointment to be seen Neurology at [facility] the same day 11/07/2022.    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

## 2022-10-31 NOTE — TELEPHONE ENCOUNTER
Please print pended note for me to sign.  Can let patient know it is ready for .  She should keep appointment on 11/9 and bring any additional paperwork needed for work at that time.  Stephnaie Castro

## 2022-11-09 ENCOUNTER — OFFICE VISIT (OUTPATIENT)
Dept: FAMILY MEDICINE CLINIC | Facility: CLINIC | Age: 27
End: 2022-11-09

## 2022-11-09 VITALS
SYSTOLIC BLOOD PRESSURE: 130 MMHG | HEART RATE: 98 BPM | BODY MASS INDEX: 47.29 KG/M2 | DIASTOLIC BLOOD PRESSURE: 84 MMHG | WEIGHT: 277 LBS | HEIGHT: 64 IN | OXYGEN SATURATION: 98 %

## 2022-11-09 DIAGNOSIS — E66.01 CLASS 3 SEVERE OBESITY DUE TO EXCESS CALORIES WITH SERIOUS COMORBIDITY AND BODY MASS INDEX (BMI) OF 45.0 TO 49.9 IN ADULT: ICD-10-CM

## 2022-11-09 DIAGNOSIS — G93.2 IDIOPATHIC INTRACRANIAL HYPERTENSION: Primary | ICD-10-CM

## 2022-11-09 DIAGNOSIS — J11.1 INFLUENZA: ICD-10-CM

## 2022-11-09 PROCEDURE — 99214 OFFICE O/P EST MOD 30 MIN: CPT | Performed by: FAMILY MEDICINE

## 2022-11-09 RX ORDER — DEXTROMETHORPHAN HYDROBROMIDE AND PROMETHAZINE HYDROCHLORIDE 15; 6.25 MG/5ML; MG/5ML
5 SYRUP ORAL 4 TIMES DAILY PRN
Qty: 240 ML | Refills: 0 | OUTPATIENT
Start: 2022-11-09 | End: 2022-11-13

## 2022-11-09 NOTE — PROGRESS NOTES
Chief Complaint  Follow-up (ER visit 10/08/2022)    Subjective    History of Present Illness {CC  Problem List  Visit  Diagnosis   Encounters  Notes  Medications  Labs  Result Review Imaging  Media :23}     Marleny Conn presents to [facility] [place] for     Chief Complaint   Patient presents with   • Follow-up     ER visit 10/08/2022      Patient seen today for follow up. Has been struggling with headaches related to intracranial hypertension.  She has seen neurology at outside facility, last visit 11/7/2022.  Given 5 days of lasix, started on diamox.  Too soon to tell if this is helping.  Recommended evaluation with sleep medicine, needs evaluation for sleep apnea. Diagnosed with flu a few days ago.  Has persistent cough.       Current Outpatient Medications:   •  buPROPion XL (Wellbutrin XL) 300 MG 24 hr tablet, Take 1 tablet by mouth Every Morning., Disp: 30 tablet, Rfl: 2  •  busPIRone (BUSPAR) 10 MG tablet, Take 1 tablet by mouth 2 (Two) Times a Day., Disp: 60 tablet, Rfl: 2  •  butalbital-acetaminophen-caffeine (FIORICET, ESGIC) -40 MG per tablet, Take 1 tablet by mouth Every 6 (Six) Hours As Needed for Headache., Disp: 15 tablet, Rfl: 0  •  cetirizine (zyrTEC) 10 MG tablet, Take 10 mg by mouth Daily., Disp: , Rfl:   •  diclofenac (VOLTAREN) 50 MG EC tablet, Take 50 mg by mouth., Disp: , Rfl:   •  dicyclomine (Bentyl) 10 MG capsule, Take 1 capsule by mouth 4 (Four) Times a Day Before Meals & at Bedtime., Disp: 120 capsule, Rfl: 1  •  Elastic Bandages & Supports (Aircast Sport Ankle Brace/Rght) misc, Use for ankle support, Disp: 1 each, Rfl: 2  •  FLUoxetine (PROzac) 40 MG capsule, Take 1 capsule by mouth Daily., Disp: 30 capsule, Rfl: 2  •  furosemide (LASIX) 20 MG tablet, Take 1 tablet by mouth Daily., Disp: 20 tablet, Rfl: 0  •  norgestimate-ethinyl estradiol (Tri-Sprintec) 0.18/0.215/0.25 MG-35 MCG per tablet, Take 1 tablet by mouth  "Daily., Disp: 28 tablet, Rfl: 3  •  omeprazole (priLOSEC) 40 MG capsule, Take 1 capsule by mouth Daily., Disp: 90 capsule, Rfl: 1  •  vitamin B-12 (CYANOCOBALAMIN) 1000 MCG tablet, TAKE 1 TABLET BY MOUTH DAILY., Disp: 90 tablet, Rfl: 1  •  vitamin D (ERGOCALCIFEROL) 1.25 MG (78018 UT) capsule capsule, TAKE 1 CAPSULE BY MOUTH 1 (ONE) TIME PER WEEK., Disp: 4 capsule, Rfl: 0  •  promethazine-dextromethorphan (PROMETHAZINE-DM) 6.25-15 MG/5ML syrup, Take 5 mL by mouth 4 (Four) Times a Day As Needed for Cough., Disp: 240 mL, Rfl: 0     Objective       Vital Signs:   /84   Pulse 98   Ht 162.6 cm (64\")   Wt 126 kg (277 lb)   SpO2 98%   BMI 47.55 kg/m²     Physical Exam  Vitals reviewed.   Constitutional:       General: She is not in acute distress.     Appearance: She is well-developed.   HENT:      Nose: Congestion present.   Cardiovascular:      Rate and Rhythm: Normal rate and regular rhythm.      Heart sounds: Normal heart sounds. No murmur heard.  Pulmonary:      Effort: Pulmonary effort is normal. No respiratory distress.      Breath sounds: Normal breath sounds. No wheezing or rales.   Skin:     General: Skin is warm and dry.   Neurological:      Mental Status: She is alert and oriented to person, place, and time.        Result Review :{ Labs  Result Review  Imaging  Med Tab  Media :23}   The following data was reviewed by: Kayleen Castro MD on 11/09/2022    Common labs    Common Labs 3/11/22 3/11/22 3/11/22 8/19/22 8/19/22    1122 1122 1122 1057 1057   Glucose   91     BUN   10     Creatinine   0.75     Sodium   138     Potassium   4.3     Chloride   105     Calcium   9.4     Albumin   4.50     Total Bilirubin   0.2     Alkaline Phosphatase   93     AST (SGOT)   23     ALT (SGPT)   36 (A)     WBC 8.63       Hemoglobin 13.8       Hematocrit 40.0       Platelets 276       Total Cholesterol  156   162   Triglycerides  71   76   HDL Cholesterol  48   48   LDL Cholesterol   94   99   Hemoglobin A1C    " 5.40    (A) Abnormal value                    Assessment and Plan {CC Problem List  Visit Diagnosis  ROS  Review (Popup)  Health Maintenance  Quality  BestPractice  Medications  SmartSets  SnapShot Encounters  Media :23}   Diagnoses and all orders for this visit:    1. Idiopathic intracranial hypertension (Primary)  -     Comprehensive Metabolic Panel; Future    2. Class 3 severe obesity due to excess calories with serious comorbidity and body mass index (BMI) of 45.0 to 49.9 in adult ([facility])    3. Influenza  -     Discontinue: promethazine-dextromethorphan (PROMETHAZINE-DM) 6.25-15 MG/5ML syrup; Take 5 mL by mouth 4 (Four) Times a Day As Needed for Cough.  Dispense: 240 mL; Refill: 0  -     XR Chest PA & Lateral; Future      Idiopathic intracranial hypertension not well controlled  Continue diamox as planned  Continue follow up with neurology  Labs next week for kidney function/potassium monitoring on new medications  Body mass index is 47.55 kg/m².  Class 3 Severe Obesity (BMI >=40). Obesity-related health conditions include the following: idiopathic intracranial hypertension. Obesity is unchanged. BMI is is above average; BMI management plan is completed. We discussed low calorie, low carb based diet program, portion control and increasing exercise.  Persistent cough/congestion after influenza  Dicussed self limited course.    Advised to rest and drink plenty of fluids.   Can use ibuprofen or tylenol for fever.    Chest Xray next week if needed  Encouraged to call/return for evaluation if worsening or persistent symptoms.       Follow Up {Instructions Charge Capture  Follow-up Communications :23}   Return in about 3 months (around 2/9/2023) for Recheck.  Patient was given instructions and counseling regarding her condition or for health maintenance advice. Please see specific information pulled into the AVS if appropriate.          This document has been electronically signed by Kayleen Castro MD

## 2022-12-02 ENCOUNTER — OFFICE VISIT (OUTPATIENT)
Dept: SLEEP MEDICINE | Facility: HOSPITAL | Age: 27
End: 2022-12-02

## 2022-12-02 VITALS
HEIGHT: 64 IN | SYSTOLIC BLOOD PRESSURE: 140 MMHG | HEART RATE: 104 BPM | OXYGEN SATURATION: 98 % | DIASTOLIC BLOOD PRESSURE: 89 MMHG | BODY MASS INDEX: 46.95 KG/M2 | WEIGHT: 275 LBS

## 2022-12-02 DIAGNOSIS — R06.83 SNORING: ICD-10-CM

## 2022-12-02 DIAGNOSIS — R09.89 CHOKING IN ADULT: ICD-10-CM

## 2022-12-02 DIAGNOSIS — F51.04 PSYCHOPHYSIOLOGICAL INSOMNIA: ICD-10-CM

## 2022-12-02 DIAGNOSIS — E66.01 MORBID OBESITY: ICD-10-CM

## 2022-12-02 DIAGNOSIS — G47.19 EXCESSIVE DAYTIME SLEEPINESS: ICD-10-CM

## 2022-12-02 DIAGNOSIS — G47.00 FREQUENT NOCTURNAL AWAKENING: ICD-10-CM

## 2022-12-02 DIAGNOSIS — J35.01 CHRONIC TONSILLITIS: Primary | ICD-10-CM

## 2022-12-02 DIAGNOSIS — R06.81 WITNESSED EPISODE OF APNEA: ICD-10-CM

## 2022-12-02 PROCEDURE — 99214 OFFICE O/P EST MOD 30 MIN: CPT | Performed by: NURSE PRACTITIONER

## 2022-12-02 RX ORDER — FUROSEMIDE 40 MG/1
40 TABLET ORAL
COMMUNITY
Start: 2022-11-30 | End: 2022-12-30

## 2022-12-02 NOTE — PROGRESS NOTES
"New Patient Sleep Medicine Consultation    Encounter Date: 2022         Patient's Primary Care Provider: Kayleen Castro MD  Referring Provider: No ref. provider found  Reason for consultation/chief complaint: snoring, loud disruptive snoring, awakening gasping for breath, witnessed apneas, excessive daytime sleepiness, unrefreshing sleep and insomnia    Marleny Conn is a 27 y.o. female who admits to snoring, unrestful sleep, decreased libido, working night shift or rotating shifts, morning headaches, stopping breathing during sleep, sleeping less than 6 hours per night, disturbed or restless sleep, night sweats, teeth grinding, difficulty falling asleep and difficulty staying asleep.    She denies cataplexy, sleep paralysis, or hypnagogic hallucinations. Her bedtime is ~ 2000. She  falls asleep after 2-3 hours, and is up 3-4 times per night. She wakes up ~ 0630. She endorses 1-2 hours of sleep. She drinks 1 cups of coffee, 1-2 teas, and 0 sodas per day. She drinks 0 alcoholic beverages per week. She is not a current smoker. She does not take sedatives or hypnotics. She has no sleepiness with driving. She naps every day unintentionally.     Patient has \"excess fluid\" on her spinal cord and causing increased intracranial pressure and swelling of optic nerves.     Mammoth - 10  Mammoth Sleepiness Scale  Sitting and reading: Slight chance of dozing  Watching TV: Moderate chance of dozing  Sitting, inactive in a public place (e.g. a theatre or a meeting): Would never doze  As a passenger in a car for an hour without a break: Moderate chance of dozing  Lying down to rest in the afternoon when circumstances permit: Moderate chance of dozing  Sitting and talking to someone: Slight chance of dozing  Sitting quietly after a lunch without alcohol: Moderate chance of dozing  In a car, while stopped for a few minutes in traffic: Would never doze  Total score: 10    Prior Sleep Testin. Attempted HST in " 2019 - failed study, no data    Past Medical History:   Diagnosis Date   • Acid reflux    • Allergic rhinitis    • Anxiety    • Anxiety    • Benign intracranial hypertension    • Bronchitis     pt denies   • Common cold    • Conjunctivitis    • Dysfunction of eustachian tube    • Examination of eyes and vision     general; NORMAL   • Gastroenteritis    • Headache    • Hemorrhoids     likely   • Infection of skin and subcutaneous tissue    • Migraine    • Nonvenomous insect bite    • Open wound of lower limb    • Pharyngitis    • Rhinitis    • Tick bite    • URI (upper respiratory infection)      Social History     Socioeconomic History   • Marital status: Single   • Number of children: 2   Tobacco Use   • Smoking status: Former     Packs/day: 1.00     Years: 13.00     Pack years: 13.00     Types: Cigarettes     Start date: 10/13/2008     Quit date: 10/1/2022     Years since quittin.1   • Smokeless tobacco: Never   Vaping Use   • Vaping Use: Never used   Substance and Sexual Activity   • Alcohol use: No   • Drug use: No   • Sexual activity: Yes     Partners: Male     Birth control/protection: Tubal ligation     Family History   Problem Relation Age of Onset   • Hypertension Brother    • Hypertension Paternal Grandmother    • Hypertension Maternal Grandmother    • Diabetes Maternal Grandmother    • No Known Problems Father    • No Known Problems Mother    • Heart attack Paternal Grandfather    • Hypertension Maternal Grandfather    • Sleep apnea Maternal Grandfather    • Heart disease Other    • Stroke Other    • Hypertension Other    • Diabetes Other    • Crohn's disease Other         Maternal Grandmother - Ana Rosa Santamaria   • Diverticulitis Other         Maternal Grandmother - Ana Rosa Santamaria   • Colon polyps Other         Maternal Grandmother - Ana Rosa Santamaria     Prior T&A, UPPP, maxillofacial, or bariatric surgery: None  Family history of sleep disorders: Grandfather - JAMIE on CPAP  Other family history + for:  "As above  Occupation: [id number] weekends  Marital status: Unmarried  Children: 2  Smoking history: smoked <1/4 ppds from age 13 until 27      Review of Systems:  Constitutional: positive for fatigue  Ears, nose, mouth, throat, and face: positive for nasal congestion, snoring and sore throat  Respiratory: positive for cough and dyspnea on exertion  Cardiovascular: positive for dyspnea and fatigue  Gastrointestinal: positive for dysphagia  Genitourinary:negative  Musculoskeletal:negative  Neurological: positive for dizziness  Behavioral/Psych: positive for fatigue and sleep disturbance  Patient advised to discuss any positive ROS with PCP.      Vitals:    12/02/22 0856   BP: 140/89   Pulse: 104   SpO2: 98%           12/02/22  0856   Weight: 125 kg (275 lb)       Body mass index is 47.18 kg/m². Class 3 Severe Obesity (BMI >=40). Obesity-related health conditions include the following: GERD. Obesity is unchanged. BMI is is above average; BMI management plan is completed. I recommend portion control and increasing exercise.    Tobacco Use: Medium Risk   • Smoking Tobacco Use: Former   • Smokeless Tobacco Use: Never   • Passive Exposure: Not on file       Physical Exam:        General: Alert. Cooperative. Well developed. No acute distress.   Head/Neck:  Normocephalic. Symmetrical. Atraumatic.     Neck circumference: 16.5\"             Eyes: Sclera clear. No icterus. PERRLA. Normal EOM.             Ears: No deformities. Normal hearing.             Nose: No septal deviation. No drainage.          Throat: No oral lesions. No thrush. Moist mucous membranes. Trachea midline.    Tongue is normal     Dentition is fair       Pharynx: Posterior pharyngeal pillars are narrow    Mallampati score of III (soft and hard palate and base of uvula visible)    Pharynx is nonerythematous, with both tonsils severely enlarged   Chest Wall:  Normal shape. Symmetric expansion with respiration. No tenderness.          Lungs:  Clear " to auscultation bilaterally. No wheezes. No rhonchi. No rales. Respirations regular, even and unlabored.            Heart:  Regular rhythm and normal rate. Normal S1 and S2. No murmur.     Abdomen:  Soft, non-tender and non-distended. Normal bowel sounds. No masses.  Extremities:  Moves all extremities well. No edema.           Pulses: Pulses palpable and equal bilaterally.               Skin: Dry. Intact. No bleeding. No rash.           Neuro: Moves all 4 extremities and cranial nerves grossly intact.  Psychiatric: Normal mood and affect.      Current Outpatient Medications:   •  buPROPion XL (Wellbutrin XL) 300 MG 24 hr tablet, Take 1 tablet by mouth Every Morning., Disp: 30 tablet, Rfl: 2  •  cetirizine (zyrTEC) 10 MG tablet, Take 10 mg by mouth Daily., Disp: , Rfl:   •  dicyclomine (Bentyl) 10 MG capsule, Take 1 capsule by mouth 4 (Four) Times a Day Before Meals & at Bedtime., Disp: 120 capsule, Rfl: 1  •  FLUoxetine (PROzac) 40 MG capsule, Take 1 capsule by mouth Daily., Disp: 30 capsule, Rfl: 2  •  norgestimate-ethinyl estradiol (Tri-Sprintec) 0.18/0.215/0.25 MG-35 MCG per tablet, Take 1 tablet by mouth Daily., Disp: 28 tablet, Rfl: 3  •  omeprazole (priLOSEC) 40 MG capsule, Take 1 capsule by mouth Daily., Disp: 90 capsule, Rfl: 1  •  vitamin B-12 (CYANOCOBALAMIN) 1000 MCG tablet, TAKE 1 TABLET BY MOUTH DAILY., Disp: 90 tablet, Rfl: 1  •  vitamin D (ERGOCALCIFEROL) 1.25 MG (76901 UT) capsule capsule, TAKE 1 CAPSULE BY MOUTH 1 (ONE) TIME PER WEEK., Disp: 4 capsule, Rfl: 0  •  furosemide (LASIX) 40 MG tablet, , Disp: , Rfl:   •  ondansetron ODT (Zofran ODT) 4 MG disintegrating tablet, Place 1 tablet on the tongue Every 8 (Eight) Hours As Needed for Nausea or Vomiting., Disp: 15 tablet, Rfl: 0    WBC   Date Value Ref Range Status   03/11/2022 8.63 3.40 - 10.80 10*3/mm3 Final     RBC   Date Value Ref Range Status   03/11/2022 4.57 3.77 - 5.28 10*6/mm3 Final     Hemoglobin   Date Value Ref Range Status   03/11/2022  13.8 12.0 - 15.9 g/dL Final     Hematocrit   Date Value Ref Range Status   03/11/2022 40.0 34.0 - 46.6 % Final     MCV   Date Value Ref Range Status   03/11/2022 87.5 79.0 - 97.0 fL Final     MCH   Date Value Ref Range Status   03/11/2022 30.2 26.6 - 33.0 pg Final     MCHC   Date Value Ref Range Status   03/11/2022 34.5 31.5 - 35.7 g/dL Final     RDW   Date Value Ref Range Status   03/11/2022 12.0 (L) 12.3 - 15.4 % Final     RDW-SD   Date Value Ref Range Status   03/11/2022 37.8 37.0 - 54.0 fl Final     MPV   Date Value Ref Range Status   03/11/2022 10.8 6.0 - 12.0 fL Final     Platelets   Date Value Ref Range Status   03/11/2022 276 140 - 450 10*3/mm3 Final     Neutrophil %   Date Value Ref Range Status   03/11/2022 57.5 42.7 - 76.0 % Final     Lymphocyte %   Date Value Ref Range Status   03/11/2022 33.8 19.6 - 45.3 % Final     Monocyte %   Date Value Ref Range Status   03/11/2022 4.9 (L) 5.0 - 12.0 % Final     Eosinophil %   Date Value Ref Range Status   03/11/2022 3.0 0.3 - 6.2 % Final     Basophil %   Date Value Ref Range Status   03/11/2022 0.6 0.0 - 1.5 % Final     Immature Grans %   Date Value Ref Range Status   03/11/2022 0.2 0.0 - 0.5 % Final     Neutrophils, Absolute   Date Value Ref Range Status   03/11/2022 4.96 1.70 - 7.00 10*3/mm3 Final     Lymphocytes, Absolute   Date Value Ref Range Status   03/11/2022 2.92 0.70 - 3.10 10*3/mm3 Final     Monocytes, Absolute   Date Value Ref Range Status   03/11/2022 0.42 0.10 - 0.90 10*3/mm3 Final     Eosinophils, Absolute   Date Value Ref Range Status   03/11/2022 0.26 0.00 - 0.40 10*3/mm3 Final     Basophils, Absolute   Date Value Ref Range Status   03/11/2022 0.05 0.00 - 0.20 10*3/mm3 Final     Immature Grans, Absolute   Date Value Ref Range Status   03/11/2022 0.02 0.00 - 0.05 10*3/mm3 Final     nRBC   Date Value Ref Range Status   03/11/2022 0.0 0.0 - 0.2 /100 WBC Final     Lab Results   Component Value Date    GLUCOSE 91 03/11/2022    BUN 10 03/11/2022     CREATININE 0.75 03/11/2022    EGFRIFNONA 96 04/09/2021    BCR 13.3 03/11/2022    K 4.3 03/11/2022    CO2 23.5 03/11/2022    CALCIUM 9.4 03/11/2022    ALBUMIN 4.50 03/11/2022    AST 23 03/11/2022    ALT 36 (H) 03/11/2022       Contraindications to home sleep test: none    ASSESSMENT:  1. Excessive daytime sleepiness, presumed obstructive sleep apnea - New (to me), additional work-up planned (4)  1. Check home sleep study (regular)  2. Follow up for results  3. Pertinent labs were reviewed as listed above  2. Snoring, presumed obstructive sleep apnea - New (to me), additional work-up planned (4)  1. As above  3. Frequent nocturnal awakenings - New (to me), additional work-up planned (4)  1. As above  4. Insomnia - sleep onset/maintenance - New (to me), additional work-up planned (4)    1.   Address after further testing  5. Chronic tonsillitis, choking - New (to me), additional workup planned   1.   Referral to ENT  6. Morbid obesity - BMI 47.2 - stable chronic illness      I spent 30 minutes caring for Marleny on this date of service. This time includes time spent by me in the following activities: preparing for the visit, reviewing tests, obtaining and/or reviewing a separately obtained history, performing a medically appropriate examination and/or evaluation , counseling and educating the patient/family/caregiver, ordering medications, tests, or procedures, referring and communicating with other health care professionals , documenting information in the medical record and care coordination; discussing PAP therapy and Further testing    RTC 2 weeks after testing. Patient agrees to return sooner if changes in symptoms.           This document has been electronically signed by PETTY Donaldson on December 2, 2022 09:19 CST          CC: Kayleen Castro MD          No ref. provider found

## 2022-12-05 ENCOUNTER — OFFICE VISIT (OUTPATIENT)
Dept: OTOLARYNGOLOGY | Facility: CLINIC | Age: 27
End: 2022-12-05

## 2022-12-05 VITALS — WEIGHT: 275.8 LBS | BODY MASS INDEX: 47.08 KG/M2 | HEIGHT: 64 IN | OXYGEN SATURATION: 98 % | HEART RATE: 108 BPM

## 2022-12-05 DIAGNOSIS — J35.01 CHRONIC TONSILLITIS: Primary | ICD-10-CM

## 2022-12-05 DIAGNOSIS — K21.9 GASTROESOPHAGEAL REFLUX DISEASE, UNSPECIFIED WHETHER ESOPHAGITIS PRESENT: ICD-10-CM

## 2022-12-05 DIAGNOSIS — J30.2 SEASONAL ALLERGIC RHINITIS, UNSPECIFIED TRIGGER: ICD-10-CM

## 2022-12-05 DIAGNOSIS — J35.8 TONSILLITH: ICD-10-CM

## 2022-12-05 DIAGNOSIS — M26.609 TMJ DYSFUNCTION: ICD-10-CM

## 2022-12-05 PROCEDURE — 99204 OFFICE O/P NEW MOD 45 MIN: CPT | Performed by: OTOLARYNGOLOGY

## 2022-12-05 NOTE — H&P (VIEW-ONLY)
Chief complaint: chronic tonsillitis    Assessment and Plan:  27F with PMH including  ICH, BMI 47, and GERD and poor sleep quality currently being worked up for JAMIE  Now with bilateral TMJ dysfunction, chronic tonsillitis, tonsilliths, and allergic rhinitis, persistent gagging and coughing with globus sensation with some foods    -Regarding her allergic rhinitis, we discussed using Flonase over-the-counter angled posterolaterally towards ipsilateral ear once daily to control congestion as well as her associated allergic findings  -Regarding her GERD we discussed making sure she takes her PPI 30 minutes prior to any food or drink in the morning so that this can work appropriately, we also discussed diet and lifestyle modifications to help aid in controlling GERD symptoms such as avoiding heavy meals especially within several hours of laying flat  -Regarding her TMJ, we did discuss seeking the care of a dentist to help with an oral appliance to decrease nighttime bruxism and clenching  -Regarding her chronic tonsillitis and tonsil stones, we discussed that we could continue your anticipatory monitoring versus surgical management, I would have her follow-up following her tonsillectomy to see if her swallowing complaints or ameliorated versus obtaining a swallow study to further evaluate postoperatively    -We did discuss the risks, benefits, and alternatives to Tonsillectomy, including the risk of bleeding, which is highest the day of surgery and 6-10 days postoperatively. This is a procedure done under general anesthesia, and therefore also has the risks associated with general anesthesia including pulmonary and cardiac injury, stroke, and death.    We discussed the expected postoperative course including 1 week off of school/work, two weeks of light activity, soft diet for 2 weeks, and the need to stay hydrated. We also discussed postoperative pain management with over the counter, scheduled acetaminophen alternating  [FreeTextEntry1] : 13 yo here with tingling sensation in right hand- likely carpal tunnel syndrome from overuse. \par Discussed screen time/video games, ect. \par Exam is WNL. \par Given prior labs with elevated A1C and glucose we discussed concern for DM. \par Fasting labs ordered, he will go today as he is fasting. \par Discussed limiting screen time and use of repetitive motions in the hand. \par Referral to pediatric orthopedist. \par \par  with scheduled ibuprofen, including through the night for the first several nights to prevent significant pain or dysphagia.  We also discussed the addition of a narcotic medication for breakthrough pain, but that pain tolerance not complete pain resolution would be the goals in the immediate 2 weeks postoperatively..    All questions regarding surgery were answered at today's visit.     History of present illness:    Ms. Conn is a 27F with PMH including ICH, BMI 47, and GERD and poor sleep quality currently being worked up for JAMIE being seen today for evaluation of chronic tonsillitis.  She states that for many years but worse in the last 1 to 2 years she has had significant irritation of the bilateral tonsils with feeling like something is stuck in the tonsils or in the corner of the throat with frequent throat clearing, gagging on food, coughing at times that is nonproductive, and odynophagia that is intermittent but very bothersome.  She denies any pneumonias.  She denies coughing with liquids.  She is currently on Prilosec and follows with an APRN at the gastroenterology clinic for GERD.  She does endorse taking her PPI at least 30 minutes before food or drink in the morning.  She is scheduled to undergo an upper endoscopy.  She also notes severe allergic rhinitis characterized as sneezing, itchy watery eyes, rhinorrhea, nasal congestion for which she uses Zyrtec daily, she states this provides some benefit but not complete resolution of symptoms and she has flares during the spring and fall.  She is currently being worked up for sleep apnea, and notes bilateral otalgia, she is aware of significant bruxism and clenching at nighttime and awakens with a sore jaw as well as a sore throat.  No other acute ENT concerns today.    Vital Signs   Vitals:    12/05/22 1346   Pulse: 108   SpO2: 98%     Physical Exam:  General: NAD, awake and alert  Head: normocephalic, atraumatic  Eyes: EOMI, sclerae white, conjunctivae  pink  Ears: pinnae intact without masses or lesions   Right: TM intact without injection or effusion   Left: TM intact without injection or effusion  Nose: external straight without deformity   Mucosa pink, not edematous. No polyps seen. No purulence.   Septum: midline   Turbinates: not hypertrophied  OC/OP: mucosa moist and pink, no masses or lesions, tongue is midline and mobile. Tonsils 3+ without exudate, tonsilliths present bilaterally. Uvula single and elevates symmetrically.  Neck: supple, no masses or lesions. Thyroid without palpable masses.  Neuro: CN II - XII grossly intact, no focal deficits    Results Review:  Primary care physician note from11/9/2022 reviewed today and demonstrates benign idiopathic intracranial hypertension and class III obesity, no complaints of tonsils at that time.  Sleep medicine APRN note from 12/2/2022 reviewed today and demonstrates complaint of chronic tonsillitis tonsils were noted to be 3+ at that time and ENT referral was made at that visit.    Review of Systems:  Positive ROS items: Trouble swallowing, choking, cough, shortness of breath, back pain, neck pain, neck stiffness, headaches, and numbness.  Otherwise, a 14 system ROS is negative except as pertinent positives are mentioned above.    Histories:  No Known Allergies    Prior to Admission medications    Medication Sig Start Date End Date Taking? Authorizing Provider   buPROPion XL (Wellbutrin XL) 300 MG 24 hr tablet Take 1 tablet by mouth Every Morning. 1/6/22  Yes Kayleen Castro MD   cetirizine (zyrTEC) 10 MG tablet Take 10 mg by mouth Daily. 10/14/21  Yes Divya Dixon MD   dicyclomine (Bentyl) 10 MG capsule Take 1 capsule by mouth 4 (Four) Times a Day Before Meals & at Bedtime. 10/13/22  Yes Lata Kenny APRN   FLUoxetine (PROzac) 40 MG capsule Take 1 capsule by mouth Daily. 4/22/22  Yes Kayleen Castro MD   furosemide (LASIX) 40 MG tablet  11/30/22  Yes Divya Dixon MD    norgestimate-ethinyl estradiol (Tri-Sprintec) 0.18/0.215/0.25 MG-35 MCG per tablet Take 1 tablet by mouth Daily. 9/13/22 9/13/23 Yes Tanna Schafer APRN   omeprazole (priLOSEC) 40 MG capsule Take 1 capsule by mouth Daily. 9/21/22  Yes Lata Kenny APRN   ondansetron ODT (Zofran ODT) 4 MG disintegrating tablet Place 1 tablet on the tongue Every 8 (Eight) Hours As Needed for Nausea or Vomiting. 11/13/22  Yes Edgar Moreno APRN   vitamin B-12 (CYANOCOBALAMIN) 1000 MCG tablet TAKE 1 TABLET BY MOUTH DAILY. 7/13/21  Yes Kayleen Castro MD   vitamin D (ERGOCALCIFEROL) 1.25 MG (71883 UT) capsule capsule TAKE 1 CAPSULE BY MOUTH 1 (ONE) TIME PER WEEK. 10/14/21  Yes Kayleen Castro MD       Past Medical History:   Diagnosis Date   • Acid reflux    • Allergic rhinitis    • Anxiety    • Anxiety    • Benign intracranial hypertension    • Bronchitis     pt denies   • Common cold    • Conjunctivitis    • Dysfunction of eustachian tube    • Examination of eyes and vision     general; NORMAL   • Gastroenteritis    • Headache    • Hemorrhoids     likely   • Infection of skin and subcutaneous tissue    • Migraine    • Nonvenomous insect bite    • Open wound of lower limb    • Pharyngitis    • Rhinitis    • Tick bite    • URI (upper respiratory infection)        Past Surgical History:   Procedure Laterality Date   • COLONOSCOPY N/A 1/15/2020    Procedure: COLONOSCOPY;  Surgeon: Sterling Neal MD;  Location: [facility] ENDOSCOPY;  Service: Gastroenterology   • ENDOSCOPY N/A 11/11/2019    Procedure: ESOPHAGOGASTRODUODENOSCOPY;  Surgeon: Sterling Neal MD;  Location: [facility] ENDOSCOPY;  Service: Gastroenterology   • SALPINGECTOMY Bilateral 8/7/2018    Procedure: SALPINGECTOMY LAPAROSCOPIC;  Surgeon: FridayElizabeth MD;  Location: [facility] OR;  Service: Obstetrics/Gynecology   • WISDOM TOOTH EXTRACTION         Social History     Socioeconomic History   • Marital status: Single   • Number of children: 2   Tobacco Use   •  Smoking status: Former     Packs/day: 1.00     Years: 13.00     Pack years: 13.00     Types: Cigarettes     Start date: 10/13/2008     Quit date: 10/1/2022     Years since quittin.1   • Smokeless tobacco: Never   Vaping Use   • Vaping Use: Never used   Substance and Sexual Activity   • Alcohol use: No   • Drug use: No   • Sexual activity: Yes     Partners: Male     Birth control/protection: Tubal ligation       Family History   Problem Relation Age of Onset   • Hypertension Brother    • Hypertension Paternal Grandmother    • Hypertension Maternal Grandmother    • Diabetes Maternal Grandmother    • No Known Problems Father    • No Known Problems Mother    • Heart attack Paternal Grandfather    • Hypertension Maternal Grandfather    • Sleep apnea Maternal Grandfather    • Heart disease Other    • Stroke Other    • Hypertension Other    • Diabetes Other    • Crohn's disease Other         Maternal Grandmother - Ana Rosa Santamaria   • Diverticulitis Other         Maternal Grandmother - An aRosa Santamaria   • Colon polyps Other         Maternal Grandmother - Ana Rosa Santamaria             This document has been electronically signed by Tasneem Cruz MD on 2022 13:49 CST

## 2022-12-05 NOTE — PROGRESS NOTES
Chief complaint: chronic tonsillitis    Assessment and Plan:  27F with PMH including  ICH, BMI 47, and GERD and poor sleep quality currently being worked up for JAMIE  Now with bilateral TMJ dysfunction, chronic tonsillitis, tonsilliths, and allergic rhinitis, persistent gagging and coughing with globus sensation with some foods    -Regarding her allergic rhinitis, we discussed using Flonase over-the-counter angled posterolaterally towards ipsilateral ear once daily to control congestion as well as her associated allergic findings  -Regarding her GERD we discussed making sure she takes her PPI 30 minutes prior to any food or drink in the morning so that this can work appropriately, we also discussed diet and lifestyle modifications to help aid in controlling GERD symptoms such as avoiding heavy meals especially within several hours of laying flat  -Regarding her TMJ, we did discuss seeking the care of a dentist to help with an oral appliance to decrease nighttime bruxism and clenching  -Regarding her chronic tonsillitis and tonsil stones, we discussed that we could continue your anticipatory monitoring versus surgical management, I would have her follow-up following her tonsillectomy to see if her swallowing complaints or ameliorated versus obtaining a swallow study to further evaluate postoperatively    -We did discuss the risks, benefits, and alternatives to Tonsillectomy, including the risk of bleeding, which is highest the day of surgery and 6-10 days postoperatively. This is a procedure done under general anesthesia, and therefore also has the risks associated with general anesthesia including pulmonary and cardiac injury, stroke, and death.    We discussed the expected postoperative course including 1 week off of school/work, two weeks of light activity, soft diet for 2 weeks, and the need to stay hydrated. We also discussed postoperative pain management with over the counter, scheduled acetaminophen alternating  with scheduled ibuprofen, including through the night for the first several nights to prevent significant pain or dysphagia.  We also discussed the addition of a narcotic medication for breakthrough pain, but that pain tolerance not complete pain resolution would be the goals in the immediate 2 weeks postoperatively..    All questions regarding surgery were answered at today's visit.     History of present illness:    Ms. Conn is a 27F with PMH including ICH, BMI 47, and GERD and poor sleep quality currently being worked up for JAMIE being seen today for evaluation of chronic tonsillitis.  She states that for many years but worse in the last 1 to 2 years she has had significant irritation of the bilateral tonsils with feeling like something is stuck in the tonsils or in the corner of the throat with frequent throat clearing, gagging on food, coughing at times that is nonproductive, and odynophagia that is intermittent but very bothersome.  She denies any pneumonias.  She denies coughing with liquids.  She is currently on Prilosec and follows with an APRN at the gastroenterology clinic for GERD.  She does endorse taking her PPI at least 30 minutes before food or drink in the morning.  She is scheduled to undergo an upper endoscopy.  She also notes severe allergic rhinitis characterized as sneezing, itchy watery eyes, rhinorrhea, nasal congestion for which she uses Zyrtec daily, she states this provides some benefit but not complete resolution of symptoms and she has flares during the spring and fall.  She is currently being worked up for sleep apnea, and notes bilateral otalgia, she is aware of significant bruxism and clenching at nighttime and awakens with a sore jaw as well as a sore throat.  No other acute ENT concerns today.    Vital Signs   Vitals:    12/05/22 1346   Pulse: 108   SpO2: 98%     Physical Exam:  General: NAD, awake and alert  Head: normocephalic, atraumatic  Eyes: EOMI, sclerae white, conjunctivae  pink  Ears: pinnae intact without masses or lesions   Right: TM intact without injection or effusion   Left: TM intact without injection or effusion  Nose: external straight without deformity   Mucosa pink, not edematous. No polyps seen. No purulence.   Septum: midline   Turbinates: not hypertrophied  OC/OP: mucosa moist and pink, no masses or lesions, tongue is midline and mobile. Tonsils 3+ without exudate, tonsilliths present bilaterally. Uvula single and elevates symmetrically.  Neck: supple, no masses or lesions. Thyroid without palpable masses.  Neuro: CN II - XII grossly intact, no focal deficits    Results Review:  Primary care physician note from11/9/2022 reviewed today and demonstrates benign idiopathic intracranial hypertension and class III obesity, no complaints of tonsils at that time.  Sleep medicine APRN note from 12/2/2022 reviewed today and demonstrates complaint of chronic tonsillitis tonsils were noted to be 3+ at that time and ENT referral was made at that visit.    Review of Systems:  Positive ROS items: Trouble swallowing, choking, cough, shortness of breath, back pain, neck pain, neck stiffness, headaches, and numbness.  Otherwise, a 14 system ROS is negative except as pertinent positives are mentioned above.    Histories:  No Known Allergies    Prior to Admission medications    Medication Sig Start Date End Date Taking? Authorizing Provider   buPROPion XL (Wellbutrin XL) 300 MG 24 hr tablet Take 1 tablet by mouth Every Morning. 1/6/22  Yes Kayleen Castro MD   cetirizine (zyrTEC) 10 MG tablet Take 10 mg by mouth Daily. 10/14/21  Yes Divya Dixon MD   dicyclomine (Bentyl) 10 MG capsule Take 1 capsule by mouth 4 (Four) Times a Day Before Meals & at Bedtime. 10/13/22  Yes Lata Kenny APRN   FLUoxetine (PROzac) 40 MG capsule Take 1 capsule by mouth Daily. 4/22/22  Yes Kayleen Castro MD   furosemide (LASIX) 40 MG tablet  11/30/22  Yes Divya Dixon MD    norgestimate-ethinyl estradiol (Tri-Sprintec) 0.18/0.215/0.25 MG-35 MCG per tablet Take 1 tablet by mouth Daily. 9/13/22 9/13/23 Yes Tanna Schafer APRN   omeprazole (priLOSEC) 40 MG capsule Take 1 capsule by mouth Daily. 9/21/22  Yes Lata Kenny APRN   ondansetron ODT (Zofran ODT) 4 MG disintegrating tablet Place 1 tablet on the tongue Every 8 (Eight) Hours As Needed for Nausea or Vomiting. 11/13/22  Yes Edgar Moreno APRN   vitamin B-12 (CYANOCOBALAMIN) 1000 MCG tablet TAKE 1 TABLET BY MOUTH DAILY. 7/13/21  Yes Kayleen Castro MD   vitamin D (ERGOCALCIFEROL) 1.25 MG (87745 UT) capsule capsule TAKE 1 CAPSULE BY MOUTH 1 (ONE) TIME PER WEEK. 10/14/21  Yes Kayleen Castro MD       Past Medical History:   Diagnosis Date   • Acid reflux    • Allergic rhinitis    • Anxiety    • Anxiety    • Benign intracranial hypertension    • Bronchitis     pt denies   • Common cold    • Conjunctivitis    • Dysfunction of eustachian tube    • Examination of eyes and vision     general; NORMAL   • Gastroenteritis    • Headache    • Hemorrhoids     likely   • Infection of skin and subcutaneous tissue    • Migraine    • Nonvenomous insect bite    • Open wound of lower limb    • Pharyngitis    • Rhinitis    • Tick bite    • URI (upper respiratory infection)        Past Surgical History:   Procedure Laterality Date   • COLONOSCOPY N/A 1/15/2020    Procedure: COLONOSCOPY;  Surgeon: Sterling Neal MD;  Location: [facility] ENDOSCOPY;  Service: Gastroenterology   • ENDOSCOPY N/A 11/11/2019    Procedure: ESOPHAGOGASTRODUODENOSCOPY;  Surgeon: Sterling Neal MD;  Location: [facility] ENDOSCOPY;  Service: Gastroenterology   • SALPINGECTOMY Bilateral 8/7/2018    Procedure: SALPINGECTOMY LAPAROSCOPIC;  Surgeon: FridayElizabeth MD;  Location: [facility] OR;  Service: Obstetrics/Gynecology   • WISDOM TOOTH EXTRACTION         Social History     Socioeconomic History   • Marital status: Single   • Number of children: 2   Tobacco Use   •  Smoking status: Former     Packs/day: 1.00     Years: 13.00     Pack years: 13.00     Types: Cigarettes     Start date: 10/13/2008     Quit date: 10/1/2022     Years since quittin.1   • Smokeless tobacco: Never   Vaping Use   • Vaping Use: Never used   Substance and Sexual Activity   • Alcohol use: No   • Drug use: No   • Sexual activity: Yes     Partners: Male     Birth control/protection: Tubal ligation       Family History   Problem Relation Age of Onset   • Hypertension Brother    • Hypertension Paternal Grandmother    • Hypertension Maternal Grandmother    • Diabetes Maternal Grandmother    • No Known Problems Father    • No Known Problems Mother    • Heart attack Paternal Grandfather    • Hypertension Maternal Grandfather    • Sleep apnea Maternal Grandfather    • Heart disease Other    • Stroke Other    • Hypertension Other    • Diabetes Other    • Crohn's disease Other         Maternal Grandmother - Ana Rosa Santamaria   • Diverticulitis Other         Maternal Grandmother - Ana Rosa Santamaria   • Colon polyps Other         Maternal Grandmother - Ana Rosa Santamaria             This document has been electronically signed by Tasneem Cruz MD on 2022 13:49 CST

## 2022-12-20 ENCOUNTER — APPOINTMENT (OUTPATIENT)
Dept: SLEEP MEDICINE | Facility: HOSPITAL | Age: 27
End: 2022-12-20

## 2022-12-30 RX ORDER — HYDROCHLOROTHIAZIDE 25 MG/1
25 TABLET ORAL DAILY
COMMUNITY

## 2023-01-02 ENCOUNTER — ANESTHESIA EVENT (OUTPATIENT)
Dept: PERIOP | Facility: HOSPITAL | Age: 28
End: 2023-01-02
Payer: COMMERCIAL

## 2023-01-03 ENCOUNTER — HOSPITAL ENCOUNTER (OUTPATIENT)
Facility: HOSPITAL | Age: 28
Setting detail: HOSPITAL OUTPATIENT SURGERY
Discharge: HOME | End: 2023-01-03
Attending: OTOLARYNGOLOGY | Admitting: OTOLARYNGOLOGY
Payer: COMMERCIAL

## 2023-01-03 ENCOUNTER — ANESTHESIA (OUTPATIENT)
Dept: PERIOP | Facility: HOSPITAL | Age: 28
End: 2023-01-03
Payer: COMMERCIAL

## 2023-01-03 VITALS
RESPIRATION RATE: 20 BRPM | TEMPERATURE: 97.8 F | DIASTOLIC BLOOD PRESSURE: 75 MMHG | HEART RATE: 96 BPM | OXYGEN SATURATION: 97 % | SYSTOLIC BLOOD PRESSURE: 131 MMHG | WEIGHT: 277.78 LBS | BODY MASS INDEX: 47.42 KG/M2 | HEIGHT: 64 IN

## 2023-01-03 DIAGNOSIS — J35.8 TONSILLITH: ICD-10-CM

## 2023-01-03 DIAGNOSIS — J35.01 CHRONIC TONSILLITIS: Primary | ICD-10-CM

## 2023-01-03 LAB
ANION GAP SERPL CALCULATED.3IONS-SCNC: 11 MMOL/L (ref 5–15)
BUN SERPL-MCNC: 10 MG/DL (ref 6–20)
BUN/CREAT SERPL: 11.5 (ref 7–25)
CALCIUM SPEC-SCNC: 9.1 MG/DL (ref 8.6–10.5)
CHLORIDE SERPL-SCNC: 102 MMOL/L (ref 98–107)
CO2 SERPL-SCNC: 25 MMOL/L (ref 22–29)
CREAT SERPL-MCNC: 0.87 MG/DL (ref 0.57–1)
EGFRCR SERPLBLD CKD-EPI 2021: 92.6 ML/MIN/1.73
GLUCOSE SERPL-MCNC: 91 MG/DL (ref 65–99)
POTASSIUM SERPL-SCNC: 4.2 MMOL/L (ref 3.5–5.2)
SODIUM SERPL-SCNC: 138 MMOL/L (ref 136–145)

## 2023-01-03 PROCEDURE — 25010000002 SUCCINYLCHOLINE PER 20 MG

## 2023-01-03 PROCEDURE — 25010000002 ONDANSETRON PER 1 MG: Performed by: ANESTHESIOLOGY

## 2023-01-03 PROCEDURE — 80048 BASIC METABOLIC PNL TOTAL CA: CPT | Performed by: ANESTHESIOLOGY

## 2023-01-03 PROCEDURE — 25010000002 PROPOFOL 200 MG/20ML EMULSION

## 2023-01-03 PROCEDURE — 25010000002 HYDROMORPHONE 1 MG/ML SOLUTION

## 2023-01-03 PROCEDURE — 25010000002 ONDANSETRON PER 1 MG

## 2023-01-03 PROCEDURE — 25010000002 DEXAMETHASONE PER 1 MG

## 2023-01-03 PROCEDURE — 63710000001 PROMETHAZINE PER 25 MG

## 2023-01-03 PROCEDURE — 25010000002 MIDAZOLAM PER 1 MG

## 2023-01-03 PROCEDURE — 42826 REMOVAL OF TONSILS: CPT | Performed by: OTOLARYNGOLOGY

## 2023-01-03 PROCEDURE — 25010000002 FENTANYL CITRATE (PF) 50 MCG/ML SOLUTION

## 2023-01-03 RX ORDER — SUCCINYLCHOLINE CHLORIDE 20 MG/ML
INJECTION INTRAMUSCULAR; INTRAVENOUS AS NEEDED
Status: DISCONTINUED | OUTPATIENT
Start: 2023-01-03 | End: 2023-01-03 | Stop reason: SURG

## 2023-01-03 RX ORDER — DIPHENHYDRAMINE HYDROCHLORIDE 50 MG/ML
12.5 INJECTION INTRAMUSCULAR; INTRAVENOUS
Status: DISCONTINUED | OUTPATIENT
Start: 2023-01-03 | End: 2023-01-03 | Stop reason: HOSPADM

## 2023-01-03 RX ORDER — ONDANSETRON 2 MG/ML
4 INJECTION INTRAMUSCULAR; INTRAVENOUS ONCE
Status: COMPLETED | OUTPATIENT
Start: 2023-01-03 | End: 2023-01-03

## 2023-01-03 RX ORDER — FLUMAZENIL 0.1 MG/ML
0.2 INJECTION INTRAVENOUS AS NEEDED
Status: DISCONTINUED | OUTPATIENT
Start: 2023-01-03 | End: 2023-01-03 | Stop reason: HOSPADM

## 2023-01-03 RX ORDER — PROMETHAZINE HYDROCHLORIDE 25 MG/1
25 TABLET ORAL ONCE AS NEEDED
Status: COMPLETED | OUTPATIENT
Start: 2023-01-03 | End: 2023-01-03

## 2023-01-03 RX ORDER — PROMETHAZINE HYDROCHLORIDE 25 MG/1
25 SUPPOSITORY RECTAL ONCE AS NEEDED
Status: COMPLETED | OUTPATIENT
Start: 2023-01-03 | End: 2023-01-03

## 2023-01-03 RX ORDER — DEXAMETHASONE SODIUM PHOSPHATE 4 MG/ML
INJECTION, SOLUTION INTRA-ARTICULAR; INTRALESIONAL; INTRAMUSCULAR; INTRAVENOUS; SOFT TISSUE AS NEEDED
Status: DISCONTINUED | OUTPATIENT
Start: 2023-01-03 | End: 2023-01-03 | Stop reason: SURG

## 2023-01-03 RX ORDER — MEPERIDINE HYDROCHLORIDE 25 MG/ML
12.5 INJECTION INTRAMUSCULAR; INTRAVENOUS; SUBCUTANEOUS
Status: DISCONTINUED | OUTPATIENT
Start: 2023-01-03 | End: 2023-01-03 | Stop reason: HOSPADM

## 2023-01-03 RX ORDER — PROPOFOL 10 MG/ML
INJECTION, EMULSION INTRAVENOUS AS NEEDED
Status: DISCONTINUED | OUTPATIENT
Start: 2023-01-03 | End: 2023-01-03 | Stop reason: SURG

## 2023-01-03 RX ORDER — FENTANYL CITRATE 50 UG/ML
INJECTION, SOLUTION INTRAMUSCULAR; INTRAVENOUS AS NEEDED
Status: DISCONTINUED | OUTPATIENT
Start: 2023-01-03 | End: 2023-01-03 | Stop reason: SURG

## 2023-01-03 RX ORDER — ACETAMINOPHEN 650 MG/1
650 SUPPOSITORY RECTAL ONCE AS NEEDED
Status: COMPLETED | OUTPATIENT
Start: 2023-01-03 | End: 2023-01-03

## 2023-01-03 RX ORDER — ACETAMINOPHEN 325 MG/1
650 TABLET ORAL ONCE AS NEEDED
Status: COMPLETED | OUTPATIENT
Start: 2023-01-03 | End: 2023-01-03

## 2023-01-03 RX ORDER — ONDANSETRON 2 MG/ML
4 INJECTION INTRAMUSCULAR; INTRAVENOUS ONCE AS NEEDED
Status: COMPLETED | OUTPATIENT
Start: 2023-01-03 | End: 2023-01-03

## 2023-01-03 RX ORDER — SODIUM CHLORIDE, SODIUM GLUCONATE, SODIUM ACETATE, POTASSIUM CHLORIDE AND MAGNESIUM CHLORIDE 526; 502; 368; 37; 30 MG/100ML; MG/100ML; MG/100ML; MG/100ML; MG/100ML
1000 INJECTION, SOLUTION INTRAVENOUS CONTINUOUS PRN
Status: DISCONTINUED | OUTPATIENT
Start: 2023-01-03 | End: 2023-01-03 | Stop reason: HOSPADM

## 2023-01-03 RX ORDER — EPHEDRINE SULFATE 50 MG/ML
5 INJECTION, SOLUTION INTRAVENOUS ONCE AS NEEDED
Status: DISCONTINUED | OUTPATIENT
Start: 2023-01-03 | End: 2023-01-03 | Stop reason: HOSPADM

## 2023-01-03 RX ORDER — NALOXONE HCL 0.4 MG/ML
0.4 VIAL (ML) INJECTION AS NEEDED
Status: DISCONTINUED | OUTPATIENT
Start: 2023-01-03 | End: 2023-01-03 | Stop reason: HOSPADM

## 2023-01-03 RX ORDER — MIDAZOLAM HYDROCHLORIDE 1 MG/ML
INJECTION INTRAMUSCULAR; INTRAVENOUS AS NEEDED
Status: DISCONTINUED | OUTPATIENT
Start: 2023-01-03 | End: 2023-01-03 | Stop reason: SURG

## 2023-01-03 RX ORDER — ONDANSETRON 4 MG/1
4 TABLET, FILM COATED ORAL EVERY 4 HOURS PRN
Qty: 30 TABLET | Refills: 0 | Status: SHIPPED | OUTPATIENT
Start: 2023-01-03 | End: 2023-02-13

## 2023-01-03 RX ADMIN — ACETAMINOPHEN 650 MG: 325 TABLET ORAL at 14:24

## 2023-01-03 RX ADMIN — SODIUM CHLORIDE, SODIUM GLUCONATE, SODIUM ACETATE, POTASSIUM CHLORIDE AND MAGNESIUM CHLORIDE 1000 ML: 526; 502; 368; 37; 30 INJECTION, SOLUTION INTRAVENOUS at 07:54

## 2023-01-03 RX ADMIN — PROMETHAZINE HYDROCHLORIDE 25 MG: 25 TABLET ORAL at 13:22

## 2023-01-03 RX ADMIN — ONDANSETRON 4 MG: 2 INJECTION INTRAMUSCULAR; INTRAVENOUS at 09:40

## 2023-01-03 RX ADMIN — ONDANSETRON 4 MG: 2 INJECTION INTRAMUSCULAR; INTRAVENOUS at 11:33

## 2023-01-03 RX ADMIN — SUCCINYLCHOLINE CHLORIDE 200 MG: 20 INJECTION, SOLUTION INTRAMUSCULAR; INTRAVENOUS at 09:36

## 2023-01-03 RX ADMIN — HYDROMORPHONE HYDROCHLORIDE 0.5 MG: 1 INJECTION, SOLUTION INTRAMUSCULAR; INTRAVENOUS; SUBCUTANEOUS at 09:52

## 2023-01-03 RX ADMIN — DEXAMETHASONE SODIUM PHOSPHATE 4 MG: 4 INJECTION, SOLUTION INTRAMUSCULAR; INTRAVENOUS at 09:40

## 2023-01-03 RX ADMIN — PROPOFOL 150 MG: 10 INJECTION, EMULSION INTRAVENOUS at 09:36

## 2023-01-03 RX ADMIN — HYDROMORPHONE HYDROCHLORIDE 0.5 MG: 1 INJECTION, SOLUTION INTRAMUSCULAR; INTRAVENOUS; SUBCUTANEOUS at 10:28

## 2023-01-03 RX ADMIN — FENTANYL CITRATE 25 MCG: 50 INJECTION, SOLUTION INTRAMUSCULAR; INTRAVENOUS at 09:33

## 2023-01-03 RX ADMIN — MIDAZOLAM 2 MG: 1 INJECTION, SOLUTION INTRAMUSCULAR; INTRAVENOUS at 09:31

## 2023-01-03 RX ADMIN — HYDROMORPHONE HYDROCHLORIDE 0.5 MG: 1 INJECTION, SOLUTION INTRAMUSCULAR; INTRAVENOUS; SUBCUTANEOUS at 10:02

## 2023-01-03 RX ADMIN — FENTANYL CITRATE 25 MCG: 50 INJECTION, SOLUTION INTRAMUSCULAR; INTRAVENOUS at 09:46

## 2023-01-03 NOTE — ANESTHESIA PREPROCEDURE EVALUATION
Anesthesia Evaluation     Patient summary reviewed and Nursing notes reviewed   no history of anesthetic complications:  NPO Solid Status: > 8 hours  NPO Liquid Status: > 8 hours           Airway   Mallampati: II  TM distance: >3 FB  Neck ROM: full  possible difficult intubation  Comment: Patient location during procedure: OR  CRNA: DEEPA LAU     Indications and Patient Condition  Indications for airway management: airway protection    Preoxygenated: yes  Mask difficulty assessment: 2 - vent by mask + OA or adjuvant +/- NMBA     Final Airway Details  Final airway type: endotracheal airway        Successful airway: ETT  Cuffed: yes   Successful intubation technique: direct laryngoscopy  Facilitating devices/methods: intubating stylet  Blade: Whitley  Blade size: #4  ETT size: 7.0 mm  Cormack-Lehane Classification: grade I - full view of glottis  Placement verified by: chest auscultation and capnometry   Measured from: lips  ETT to lips (cm): 20  Number of attempts at approach: 1  Dental    (+) poor dentation    Pulmonary - negative pulmonary ROS    breath sounds clear to auscultation  (-) shortness of breath, not a smokerSleep apnea: Snores secondary to tonsillar hypertrophy.  Cardiovascular     ECG reviewed  Rhythm: regular  Rate: normal    (-) angina, BANUELOS, murmur    ROS comment: Normal sinus rhythm  Normal ECG  When compared with ECG of 28-AUG-2019 18:25,  Criteria for Septal infarct are no longer Present  ST no longer depressed in Anterior leads  T wave amplitude has increased in Anterior leads     Referred By: MAGDALENO MULLINS           Confirmed By: DELFINA RICO MD    Neuro/Psych  (+) headaches (Migraine), psychiatric history Anxiety,    GI/Hepatic/Renal/Endo    (+) morbid obesity, GERD well controlled,      Musculoskeletal (-) negative ROS    Abdominal   (+) obese,    Substance History - negative use     OB/GYN negative ob/gyn ROS   Gestational age approximate: Bilateral salpingectomy 8/7/2021.         Other - negative ROS                       Anesthesia Plan    ASA 3     general     intravenous induction     Anesthetic plan, risks, benefits, and alternatives have been provided, discussed and informed consent has been obtained with: patient.  Pre-procedure education provided  Plan discussed with CRNA.        CODE STATUS:

## 2023-01-03 NOTE — DISCHARGE INSTRUCTIONS
You have had a tonsillectomy, removal of the lymphoid tissue at the back of the throat:    -Bleeding is the most common complication of tonsillectomy. This risk is highest the day of surgery and 6-10 days after surgery.    -If you have bright red blood from the back of the throat, spray Afrin, an over-the-counter decongestant spray to the affected area and call our office for further instructions. If it is a large amount or does not stop, report to the nearest Emergency Department for stabilization.    -Soft diet for the next 2 weeks    -Light activity for 2 weeks: No bending, lifting, or straining. No rough play. No gym class.    -For pain, take over the counter Tylenol every 6 hours and Ibuprofen every 6 hours, alternating so that you get something every 3 hours. Schedule this including through the night for the first 3-4 days.    -Encourage fluids by mouth. Hydration is the best defence against both pain, and bleeding.    -It is normal to experience some ear pain after tonsillectomy, this should be manageable with the above regimen.    -If you experience bleeding from the tonsillar fossa, fevers >100.5 for >24 hours, or inability to tolerate fluids by mouth, please call our office.    -Follow up with  as needed or if further problems.  Call [phone] for an appointment or with questions or concerns.

## 2023-01-03 NOTE — ANESTHESIA PROCEDURE NOTES
Airway  Date/Time: 1/3/2023 9:36 AM  End Time:1/3/2023 9:36 AM    Indications and Patient Condition  Indications for airway management: airway protection and CNS depression    Preoxygenated: yes  MILS maintained throughout  Mask difficulty assessment: 0 - not attempted    Final Airway Details  Final airway type: endotracheal airway      Successful airway: ETT  Cuffed: yes   Successful intubation technique: video laryngoscopy  Facilitating devices/methods: intubating stylet  Endotracheal tube insertion site: oral  Blade: Robertson  Blade size: 3  ETT size (mm): 7.0  Placement verified by: chest auscultation and capnometry   Measured from: lips  ETT/EBT  to lips (cm): 23  Number of attempts at approach: 1  Assessment: lips, teeth, and gum same as pre-op and atraumatic intubation

## 2023-01-03 NOTE — OP NOTE
Otolaryngology Operative Report    Marleny Conn  1/3/2023    Indications:  Ms. Conn is a 27F presenting for surgery after discussion in the clinic.    Procedure:  1. Tonsillectomy    Pre-Op Diagnosis: chronic tonsillitis and tonsilliths    Post-Op Diagnosis: same    Findings:  1. Tonsils 2+, endophytic with tonsilliths and crypts  2. Adenoids 1+  3. Single uvula  4. No evidence of Submucosal cleft palate  5. Nasopharynx anatomically normal with bilateral patent choanae and Eustachian tube orifices     Specimen(s) Removed: Left and right tonsils    Anesthesia: General (GETA)    Surgeon: Tasneem Cruz MD    Assistant: none    Complications: none    Estimated Blood Loss: 15 mL    Description of procedure:  After informed consent was obtained from the parents, the patient was brought into the operating suite and farideh supine on the operating table. General endotracheal anesthesia was then induced. The patient was draped in the usual clean fashion for the above stated procedures. After a Time out and all were in agreement about the correct patient and procedures, the bed was rotated 90 degrees and a Olu-Glenn mouthgag carefully inserted with care given to avoid injuring the lips, teeth, or tongue. This was opened to reveal the oropharynx and the above findings. A flexible catheter was used to retract the soft palate and a laryngeal mirror used to visualize the nasopharynx, revealing the above findings. Attention was directed toward the right tonsil, which was grasped with a curved Allis clamp and medialized. An incision was made along along the plica triangularis to expose the tonsil capsule using Bovie electrocautery.  Once the capsule was exposed, the incision was carried posterior and inferior until tonsil was removed in its entirety. The same was performed on the left. Both tonsils were sent together for gross pathology.The mouthgag was relaxed for a period of 1-2 minutes and re-suspended. Any areas of  bleeding were cauterized with Bovie electrocautery. There was no further evidence of bleeding. A flexible suction was passed to suction the gastric contents and oropharynx. The flexible catheter was removed. The mouthgag was relaxed and removed with care again given to avoid injuring the lips, teeth, or tongue. This concluded the procedure. The patient was returned to the care of anesthesia in a stable condition for emergence. All counts were correct at the conclusion of the procedure.    Disposition:  The patient was extubated and transferred to PACU in a stable condition.    Signed:  Tasneem Cruz MD  1/3/2023  09:33 CST

## 2023-01-03 NOTE — INTERVAL H&P NOTE
H&P reviewed. The patient was examined and there are no changes to the H&P.      Vitals:    01/03/23 0742   BP: 120/59   Pulse: 94   Resp: 18   Temp: 97.3 °F (36.3 °C)   SpO2: 97%     A blank in the above vital signs indicates that the included field was not performed at the time of my evaluation.    Immunization status not asked and therefore not documented    10 point Review of systems negative unless otherwise noted in the HPI of the referenced H&P.    Tasneem Cruz MD  1/3/2023 08:30 CST.

## 2023-01-03 NOTE — ANESTHESIA POSTPROCEDURE EVALUATION
Patient: Marleny Conn    Procedure Summary     Date: 01/03/23 Room / Location: [facility] OR 08 / [facility] OR    Anesthesia Start: 0930 Anesthesia Stop: 1012    Procedure: TONSILLECTOMY (Bilateral: Throat) Diagnosis:       Chronic tonsillitis      Tonsillith      (Chronic tonsillitis [J35.01])      (Tonsillith [J35.8])    Surgeons: Tasneem Cruz MD Provider: Vivian Mitchell CRNA    Anesthesia Type: general ASA Status: 3          Anesthesia Type: general    Vitals  No vitals data found for the desired time range.          Post Anesthesia Care and Evaluation    Patient location during evaluation: PACU  Patient participation: complete - patient cannot participate  Level of consciousness: sleepy but conscious  Pain score: 0  Pain management: adequate    Airway patency: patent  Anesthetic complications: No anesthetic complications  PONV Status: none  Cardiovascular status: acceptable  Respiratory status: acceptable  Hydration status: acceptable    Comments: Vital signs:    HR: 105  BP: 173/86  SpO2: 98  RR: 16  Temp: 98.2  No anesthesia care post op

## 2023-01-05 LAB — REF LAB TEST METHOD: NORMAL

## 2023-02-13 ENCOUNTER — OFFICE VISIT (OUTPATIENT)
Dept: FAMILY MEDICINE CLINIC | Facility: CLINIC | Age: 28
End: 2023-02-13
Payer: COMMERCIAL

## 2023-02-13 VITALS
WEIGHT: 284 LBS | HEART RATE: 98 BPM | DIASTOLIC BLOOD PRESSURE: 80 MMHG | HEIGHT: 64 IN | BODY MASS INDEX: 48.49 KG/M2 | SYSTOLIC BLOOD PRESSURE: 118 MMHG | OXYGEN SATURATION: 99 %

## 2023-02-13 DIAGNOSIS — G93.2 IDIOPATHIC INTRACRANIAL HYPERTENSION: Primary | ICD-10-CM

## 2023-02-13 DIAGNOSIS — M54.50 CHRONIC BILATERAL LOW BACK PAIN WITHOUT SCIATICA: ICD-10-CM

## 2023-02-13 DIAGNOSIS — G56.03 BILATERAL CARPAL TUNNEL SYNDROME: ICD-10-CM

## 2023-02-13 DIAGNOSIS — R20.0 NUMBNESS OF FACE: ICD-10-CM

## 2023-02-13 DIAGNOSIS — E66.01 CLASS 3 SEVERE OBESITY DUE TO EXCESS CALORIES WITH SERIOUS COMORBIDITY AND BODY MASS INDEX (BMI) OF 45.0 TO 49.9 IN ADULT: ICD-10-CM

## 2023-02-13 DIAGNOSIS — G89.29 CHRONIC BILATERAL LOW BACK PAIN WITHOUT SCIATICA: ICD-10-CM

## 2023-02-13 DIAGNOSIS — F33.1 MODERATE EPISODE OF RECURRENT MAJOR DEPRESSIVE DISORDER: ICD-10-CM

## 2023-02-13 PROCEDURE — 99214 OFFICE O/P EST MOD 30 MIN: CPT | Performed by: FAMILY MEDICINE

## 2023-02-13 RX ORDER — ACETAZOLAMIDE 500 MG/1
CAPSULE, EXTENDED RELEASE ORAL
COMMUNITY
Start: 2022-12-04

## 2023-02-13 NOTE — PROGRESS NOTES
Chief Complaint  Hypertension    Subjective    History of Present Illness {CC  Problem List  Visit  Diagnosis   Encounters  Notes  Medications  Labs  Result Review Imaging  Media :23}     Marleny Conn presents to [facility] [place] for     Chief Complaint   Patient presents with   • Hypertension      Patient seen today for follow up.  Notes that she is not doing well since last visit.  She continues to have back pain and headaches.  Also some vision changes.  She did not make last appointment to neurology.  Diamox not helping. Experiencing some facial numbness, left sided.  Says that this has been going on for the last few years, getting worse again over the last 6 months.   Also having some numbness, tingling and weakness in her hands bilaterally.       Current Outpatient Medications:   •  buPROPion XL (Wellbutrin XL) 300 MG 24 hr tablet, Take 1 tablet by mouth Every Morning., Disp: 30 tablet, Rfl: 2  •  cetirizine (zyrTEC) 10 MG tablet, Take 10 mg by mouth Daily., Disp: , Rfl:   •  dicyclomine (Bentyl) 10 MG capsule, Take 1 capsule by mouth 4 (Four) Times a Day Before Meals & at Bedtime., Disp: 120 capsule, Rfl: 1  •  FLUoxetine (PROzac) 40 MG capsule, Take 1 capsule by mouth Daily., Disp: 30 capsule, Rfl: 2  •  hydroCHLOROthiazide (HYDRODIURIL) 25 MG tablet, Take 25 mg by mouth Daily., Disp: , Rfl:   •  norgestimate-ethinyl estradiol (Tri-Sprintec) 0.18/0.215/0.25 MG-35 MCG per tablet, Take 1 tablet by mouth Daily., Disp: 28 tablet, Rfl: 3  •  omeprazole (priLOSEC) 40 MG capsule, Take 1 capsule by mouth Daily., Disp: 90 capsule, Rfl: 1  •  vitamin B-12 (CYANOCOBALAMIN) 1000 MCG tablet, TAKE 1 TABLET BY MOUTH DAILY., Disp: 90 tablet, Rfl: 1  •  vitamin D3 125 MCG (5000 UT) capsule capsule, Take 5,000 Units by mouth 1 (One) Time Per Week., Disp: , Rfl:   •  acetaZOLAMIDE (DIAMOX) 500 MG capsule, , Disp: , Rfl:      Objective       Vital Signs:   BP  "118/80   Pulse 98   Ht 162.6 cm (64\")   Wt 129 kg (284 lb)   SpO2 99%   BMI 48.75 kg/m²     Physical Exam  Vitals reviewed.   Constitutional:       General: She is not in acute distress.     Appearance: She is well-developed.   Cardiovascular:      Rate and Rhythm: Normal rate and regular rhythm.      Heart sounds: Normal heart sounds. No murmur heard.  Pulmonary:      Effort: Pulmonary effort is normal. No respiratory distress.      Breath sounds: Normal breath sounds. No wheezing or rales.   Skin:     General: Skin is warm and dry.   Neurological:      Mental Status: She is alert and oriented to person, place, and time.   Psychiatric:         Mood and Affect: Mood is depressed. Affect is tearful.         Behavior: Behavior normal.        Result Review :{ Labs  Result Review  Imaging  Med Tab  Media :23}   The following data was reviewed by: Kayleen Castro MD on 02/13/2023    Common labs    Common Labs 8/19/22 8/19/22 1/3/23    1057 1057    Glucose   91   BUN   10   Creatinine   0.87   Sodium   138   Potassium   4.2   Chloride   102   Calcium   9.1   Albumin      Total Bilirubin      Alkaline Phosphatase      AST (SGOT)      ALT (SGPT)      WBC      Hemoglobin      Hematocrit      Platelets      Total Cholesterol  162    Triglycerides  76    HDL Cholesterol  48    LDL Cholesterol   99    Hemoglobin A1C 5.40     (A) Abnormal value       Comments are available for some flowsheets but are not being displayed.                   Assessment and Plan {CC Problem List  Visit Diagnosis  ROS  Review (Popup)  [facility] Maintenance  Quality  BestPractice  Medications  SmartSets  SnapShot Encounters  Media :23}   Diagnoses and all orders for this visit:    1. Idiopathic intracranial hypertension (Primary)    2. Numbness of face    3. Class 3 severe obesity due to excess calories with serious comorbidity and body mass index (BMI) of 45.0 to 49.9 in adult (HCC)    4. Chronic bilateral low back pain without " sciatica  -     XR Spine Lumbar 4+ View; Future    5. Moderate episode of recurrent major depressive disorder (HCC)    6. Bilateral carpal tunnel syndrome  -     Miscellaneous DME       Patient seen today for follow up  Idiopathic intracranial hypertension, causing headaches and vision changes  No improvement of symptoms with diamox  Strongly encouraged close follow up appointment with neurology  One pressure/symptoms have improved, will be able to do physical therapy to help with low back pain  Xray lumbar spine today  Depression not well controlled, related to health concerns  Continue Prozac and Wellbutrin  Hand symptoms consistent with carpal tunnel  Wrist splints as initial treatment      Follow Up {Instructions Charge Capture  Follow-up Communications :23}   Return in about 2 months (around 4/13/2023) for Recheck.  Patient was given instructions and counseling regarding her condition or for health maintenance advice. Please see specific information pulled into the AVS if appropriate.            This document has been electronically signed by Kayleen Castro MD

## 2023-02-15 ENCOUNTER — TELEPHONE (OUTPATIENT)
Dept: FAMILY MEDICINE CLINIC | Facility: CLINIC | Age: 28
End: 2023-02-15
Payer: COMMERCIAL

## 2023-02-15 NOTE — TELEPHONE ENCOUNTER
Per Dr. Castro, Ms. Conn has been called with recent lab results & recommendations.  Continue current medications and follow-up as planned or sooner if any problems.     Yes, she has a follow-up with neurology.   She also said that Physical Therapy @ [facility] will not touch her back due to the fluid she has on her spine.         ----- Message from Kayleen Castro MD sent at 2/14/2023  4:22 PM CST -----  Xray lumbar spine shows some spine changes consistent with degenerative changes that may be contributing to her pain.  Please make sure she has set up follow up neurology visit and transportation.  Needs continued management of back pain, including physical therapy and likely additional imaging once she is able to do so.  Thanks, DENNIS Castro

## 2023-06-25 LAB — REF LAB TEST METHOD: NORMAL

## 2023-08-15 ENCOUNTER — OFFICE VISIT (OUTPATIENT)
Dept: FAMILY MEDICINE CLINIC | Facility: CLINIC | Age: 28
End: 2023-08-15
Payer: COMMERCIAL

## 2023-08-15 VITALS
OXYGEN SATURATION: 98 % | HEART RATE: 93 BPM | DIASTOLIC BLOOD PRESSURE: 80 MMHG | SYSTOLIC BLOOD PRESSURE: 130 MMHG | HEIGHT: 64 IN | BODY MASS INDEX: 49.34 KG/M2 | WEIGHT: 289 LBS

## 2023-08-15 DIAGNOSIS — M54.50 CHRONIC BILATERAL LOW BACK PAIN WITHOUT SCIATICA: ICD-10-CM

## 2023-08-15 DIAGNOSIS — F41.9 ANXIETY: ICD-10-CM

## 2023-08-15 DIAGNOSIS — G93.2 IDIOPATHIC INTRACRANIAL HYPERTENSION: Primary | ICD-10-CM

## 2023-08-15 DIAGNOSIS — G89.29 CHRONIC BILATERAL LOW BACK PAIN WITHOUT SCIATICA: ICD-10-CM

## 2023-08-15 DIAGNOSIS — F43.21 GRIEF REACTION: ICD-10-CM

## 2023-08-15 DIAGNOSIS — G47.9 SLEEPING DIFFICULTY: ICD-10-CM

## 2023-08-15 PROCEDURE — 1159F MED LIST DOCD IN RCRD: CPT | Performed by: FAMILY MEDICINE

## 2023-08-15 PROCEDURE — 99214 OFFICE O/P EST MOD 30 MIN: CPT | Performed by: FAMILY MEDICINE

## 2023-08-15 PROCEDURE — 1160F RVW MEDS BY RX/DR IN RCRD: CPT | Performed by: FAMILY MEDICINE

## 2023-08-15 RX ORDER — FLUOXETINE HYDROCHLORIDE 20 MG/1
20 CAPSULE ORAL DAILY
Qty: 90 CAPSULE | Refills: 1 | Status: SHIPPED | OUTPATIENT
Start: 2023-08-15

## 2023-08-15 RX ORDER — TOPIRAMATE 100 MG/1
100 TABLET, FILM COATED ORAL
COMMUNITY
Start: 2023-02-27 | End: 2023-08-27

## 2023-08-15 RX ORDER — ASPIRIN 325 MG
325 TABLET ORAL DAILY
COMMUNITY

## 2023-08-15 RX ORDER — CLOPIDOGREL BISULFATE 75 MG/1
75 TABLET ORAL DAILY
Qty: 30 TABLET | Refills: 6 | COMMUNITY
Start: 2023-06-20 | End: 2023-12-18

## 2023-08-15 NOTE — PROGRESS NOTES
Chief Complaint  Hypertension    Subjective    History of Present Illness {CC  Problem List  Visit  Diagnosis   Encounters  Notes  Medications  Labs  Result Review Imaging  Media :23}     Marleny Conn presents to [facility] [place] for     Chief Complaint   Patient presents with    Hypertension      Patient seen today for follow-up.  Has been following with neurosurgery for pseudotumor cerebri.  Had left-sided transverse sinus stent placed on 6/30/2023.  Improvement with headaches and vision changes.  Patient complains mostly of back pain today.  This has been chronic.  Had started physical therapy, but unable to continue appointments due to problems with pseudotumor cerebri.  Also having some trouble sleeping, which she relates mostly is secondary to the back pain.  Patient was due to have evaluation with sleep medicine, however these appointments got pushed due to concerns with her symptoms from pseudotumor.  Having some increased anxiety and grief, death of a close friend recently.  Patient has been off of her Prozac since surgery.  Patient does have family history of bipolar disorder.  Has some concerns about irritability.    Wt Readings from Last 3 Encounters:   08/15/23 131 kg (289 lb)   02/13/23 129 kg (284 lb)   01/03/23 126 kg (277 lb 12.5 oz)        Current Outpatient Medications:     acetaZOLAMIDE (DIAMOX) 500 MG capsule, , Disp: , Rfl:     aspirin 325 MG tablet, Take 1 tablet by mouth Daily. (Patient not taking: Reported on 8/15/2023), Disp: , Rfl:     buPROPion XL (Wellbutrin XL) 300 MG 24 hr tablet, Take 1 tablet by mouth Every Morning. (Patient not taking: Reported on 8/15/2023), Disp: 30 tablet, Rfl: 2    cetirizine (zyrTEC) 10 MG tablet, Take 1 tablet by mouth Daily. (Patient not taking: Reported on 8/15/2023), Disp: , Rfl:     clopidogrel (PLAVIX) 75 MG tablet, Take 1 tablet by mouth Daily. (Patient not taking: Reported on 8/15/2023),  "Disp: 30 tablet, Rfl: 6    dicyclomine (Bentyl) 10 MG capsule, Take 1 capsule by mouth 4 (Four) Times a Day Before Meals & at Bedtime. (Patient not taking: Reported on 8/15/2023), Disp: 120 capsule, Rfl: 1    FLUoxetine (PROzac) 40 MG capsule, Take 1 capsule by mouth Daily. (Patient not taking: Reported on 8/15/2023), Disp: 30 capsule, Rfl: 2    hydroCHLOROthiazide (HYDRODIURIL) 25 MG tablet, Take 1 tablet by mouth Daily. (Patient not taking: Reported on 8/15/2023), Disp: , Rfl:     norgestimate-ethinyl estradiol (Tri-Sprintec) 0.18/0.215/0.25 MG-35 MCG per tablet, Take 1 tablet by mouth Daily. (Patient not taking: Reported on 8/15/2023), Disp: 28 tablet, Rfl: 3    omeprazole (priLOSEC) 40 MG capsule, Take 1 capsule by mouth Daily. (Patient not taking: Reported on 8/15/2023), Disp: 90 capsule, Rfl: 1    topiramate (TOPAMAX) 100 MG tablet, Take 1 tablet by mouth. (Patient not taking: Reported on 8/15/2023), Disp: , Rfl:     vitamin B-12 (CYANOCOBALAMIN) 1000 MCG tablet, TAKE 1 TABLET BY MOUTH DAILY. (Patient not taking: Reported on 8/15/2023), Disp: 90 tablet, Rfl: 1    vitamin D3 125 MCG (5000 UT) capsule capsule, Take 1 capsule by mouth 1 (One) Time Per Week. (Patient not taking: Reported on 8/15/2023), Disp: , Rfl:      Objective       Vital Signs:   /80   Pulse 93   Ht 162.6 cm (64\")   Wt 131 kg (289 lb)   SpO2 98%   BMI 49.61 kg/mý     Physical Exam  Vitals reviewed.   Constitutional:       General: She is not in acute distress.     Appearance: She is well-developed.   Cardiovascular:      Rate and Rhythm: Normal rate and regular rhythm.      Heart sounds: Normal heart sounds. No murmur heard.  Pulmonary:      Effort: Pulmonary effort is normal. No respiratory distress.      Breath sounds: Normal breath sounds. No wheezing or rales.   Abdominal:      Palpations: Abdomen is soft.      Tenderness: There is no abdominal tenderness.   Skin:     General: Skin is warm and dry.   Neurological:      Mental " "Status: She is alert and oriented to person, place, and time.      Result Review :{ Labs  Result Review  Imaging  Med Tab  Media :23}   The following data was reviewed by: Kayleen Castro MD on 08/15/2023    Common labs          1/3/2023    07:47 5/12/2023    11:26 6/20/2023    13:25   Common Labs   Glucose 91      BUN 10      Creatinine 0.87      Sodium 138      Potassium 4.2      Chloride 102      Calcium 9.1      WBC  8.4     10.6       Hemoglobin  13.7     13.0       Hematocrit  40.5     40.7       Platelets  244     269         Xray Lumbar Spine 02/13/2023  \"IMPRESSION:  CONCLUSION:  Congenitally short pedicles.  Mild retrolisthesis of L4 on L5.  Bilateral L5 spondylolysis with grade 1 spondylolisthesis L5 on  S1.  Spina bifida occulta L5.\"            Assessment and Plan {CC Problem List  Visit Diagnosis  ROS  Review (Popup)  [facility] Maintenance  Quality  BestPractice  Medications  SmartSets  SnapShot Encounters  Media :23}   Diagnoses and all orders for this visit:    1. Idiopathic intracranial hypertension (Primary)  -     Ambulatory Referral to Sleep Medicine    2. Chronic bilateral low back pain without sciatica  -     Ambulatory Referral to Physical Therapy Evaluate and treat    3. Sleeping difficulty  -     Ambulatory Referral to Sleep Medicine    4. Anxiety  -     FLUoxetine (PROzac) 20 MG capsule; Take 1 capsule by mouth Daily.  Dispense: 90 capsule; Refill: 1    5. Grief reaction       Idiopathic intracranial hypertension, status post transverse sinus stent  Symptoms of headache and vision changes improved  Patient will continue to follow with neurosurgery and ophthalmology  Chronic low back pain, encouraged to restart physical therapy  Had x-ray with some abnormalities as above  If no improvement in back pain symptoms with trial of physical therapy, will plan to get additional imaging with MRI  Sleeping difficulty secondary to back pain, will treat underlying cause  Do need to " determine if patient has sleep apnea, as this can affect pseudotumor cerebri  Referral back to sleep medicine  Anxiety symptoms not well controlled, restart Prozac 20 mg daily  Grief reaction secondary to death of friend  Patient desires counseling  Recommended daily walk-in hours for intake at [facility]      Follow Up {Instructions Charge Capture  Follow-up Communications :23}   Return in about 6 weeks (around 9/26/2023) for Recheck.  Patient was given instructions and counseling regarding her condition or for health maintenance advice. Please see specific information pulled into the AVS if appropriate.            This document has been electronically signed by Kayleen Castro MD

## 2023-08-16 ENCOUNTER — HOSPITAL ENCOUNTER (OUTPATIENT)
Dept: PHYSICAL THERAPY | Facility: HOSPITAL | Age: 28
Setting detail: THERAPIES SERIES
Discharge: HOME | End: 2023-08-16
Payer: COMMERCIAL

## 2023-08-16 DIAGNOSIS — G89.29 CHRONIC BILATERAL LOW BACK PAIN WITHOUT SCIATICA: Primary | ICD-10-CM

## 2023-08-16 DIAGNOSIS — M54.50 CHRONIC BILATERAL LOW BACK PAIN WITHOUT SCIATICA: Primary | ICD-10-CM

## 2023-08-16 PROCEDURE — 97161 PT EVAL LOW COMPLEX 20 MIN: CPT

## 2023-08-17 NOTE — THERAPY EVALUATION
Outpatient Physical Therapy Ortho Initial Evaluation  [facility]     Patient Name: Marleny Conn  : 1995  MRN: [id number]  Today's Date: 2023      Visit Date: 2023    Attendance:  (awaiting authorization)  Subjective % improved: N/A  Recert Due Date: 23  MD appt: 23    Therapy diagnosis: Acute on chronic right side low back pain      Patient Active Problem List   Diagnosis    Morbidly obese    Epigastric pain    Change in bowel habits    Diarrhea    Generalized abdominal pain    Menorrhagia with irregular cycle    Pelvic pain    Gastroesophageal reflux disease with esophagitis without hemorrhage    Early satiety    Chronic tonsillitis    Tonsillith        Past Medical History:   Diagnosis Date    Acid reflux     Allergic rhinitis     Anxiety     Benign intracranial hypertension     Bronchitis     pt denies    Common cold     Conjunctivitis     Dysfunction of eustachian tube     Examination of eyes and vision     general; NORMAL    Gastroenteritis     Headache     Hemorrhoids     likely    Infection of skin and subcutaneous tissue     Migraine     Nonvenomous insect bite     Open wound of lower limb     Pharyngitis     Rhinitis     Tick bite     URI (upper respiratory infection)         Past Surgical History:   Procedure Laterality Date    COLONOSCOPY N/A 1/15/2020    Procedure: COLONOSCOPY;  Surgeon: Sterling Neal MD;  Location: [facility] ENDOSCOPY;  Service: Gastroenterology    ENDOSCOPY N/A 2019    Procedure: ESOPHAGOGASTRODUODENOSCOPY;  Surgeon: Sterling Neal MD;  Location: [facility] ENDOSCOPY;  Service: Gastroenterology    SALPINGECTOMY Bilateral 2018    Procedure: SALPINGECTOMY LAPAROSCOPIC;  Surgeon: FridayElizabeth MD;  Location: [facility] OR;  Service: Obstetrics/Gynecology    TONSILLECTOMY Bilateral 1/3/2023    Procedure: TONSILLECTOMY;  Surgeon: Tasneem Cruz MD;  Location: [facility] OR;  Service: ENT;  Laterality: Bilateral;    WISDOM TOOTH EXTRACTION          Visit Dx:     ICD-10-CM ICD-9-CM   1. Chronic bilateral low back pain without sciatica  M54.50 724.2    G89.29 338.29          Patient History       Row Name 08/16/23 1500             History    Chief Complaint Pain  -ND      Type of Pain Back pain  Right-side  -ND      Date Current Problem(s) Began --  Chronic  -ND      Brief Description of Current Complaint Pt presents to PT with c/o acute on chronic low back pain. The pain is from mid-line across the right side of her low back into the top of her hip. She denies any N/T. She reports that it feels like a ball of tight muscle with standing to wash the dishes. Pt had a left-sided transverse sinus stent placed on 6/30/23 to address pseudotumor cerebri. She had multiple spinal taps in the lumbar spine to drain fluid. Pt reports increased pain with laying supine. She states that her lower back feels really tight with bending down.  -ND      Previous treatment for THIS PROBLEM Rehabilitation  -ND      Patient/Caregiver Goals Relieve pain;Improve mobility;Return to prior level of function  -ND      Current Tobacco Use No  -ND      Smoking Status Former  -ND      Hand Dominance right-handed  -ND      Occupation/sports/leisure activities Occupation: [employer]; Hobbies: being outside with her two daughters, going hiking  -ND      How has patient tried to help current problem? Epsom salts, ice/heat, medication (OTC Ibuprofen)  -ND      What clinical tests have you had for this problem? X-ray  -ND      Results of Clinical Tests Copied from 2/13/23 report: Congenitally short pedicles.  Mild retrolisthesis of L4 on L5.  Bilateral L5 spondylolysis with grade 1 spondylolisthesis L5 on  S1.  Spina bifida occulta L5  -ND         Pain     Pain Location Back  -ND      Pain at Present 8  -ND      Pain at Worst 10  -ND      Pain Frequency Constant/continuous  -ND      Pain Description Pressure  -ND      What Performance Factors Make the Current Problem(s) WORSE? Standing, sitting  "to drive, bending  -ND      What Performance Factors Make the Current Problem(s) BETTER? Walking, moving around  -ND      Is your sleep disturbed? Yes  -ND         Fall Risk Assessment    Any falls in the past year: Yes  -ND      Number of falls reported in the last 12 months 1  -ND      Factors that contributed to the fall: Slippery surface  Fell on bent left knee after slipping in water on kitchen floor.  -ND         Services    Prior Rehab/Home Health Experiences No  -ND         Daily Activities    Primary Language English  -ND         Safety    Are you being hurt, hit, or frightened by anyone at home or in your life? No  -ND      Have you had any of the following issues with Depression  -ND                User Key  (r) = Recorded By, (t) = Taken By, (c) = Cosigned By      Initials Name Provider Type    Chaz Martin, SELAM Physical Therapist                     PT Ortho       Row Name 08/16/23 1500       Subjective Comments    Subjective Comments See Patient History  -ND       Precautions and Contraindications    Precautions Benign intracranial hypertension  -ND       Subjective Pain    Able to rate subjective pain? yes  -ND    Pre-Treatment Pain Level 8  -ND       Posture/Observations    Posture/Observations Comments Decreased lumbar lordosis. Hinges at L3-4 with trunk flexion & extension. LLD w/ right leg longer than right. Posterior rotation of right innominate. Increased pain with posterior steering wheel mobilization to right innominate.  -ND       Lumbar/SI Special Tests    SLR (Neural Tension) Right:;Positive  -ND    SI Compression Test (SI Dysfunction) Right:;Positive  \"stinging pain\" on right side of low back  -ND    FAIR Test (Piriformis Syndrome) Right:;Positive  -ND       Lumbosacral Palpation    SI Right:;Tender  -ND    Piriformis Right:;Guarded/taut  -ND    Gluteus Loy Right:;Guarded/taut  -ND    Quadratus Lumborum Right:;Guarded/taut  -ND    Erector Spinae (Paraspinals) " "Right:;Tender;Guarded/taut  -ND    Iliopsoas Right:;Tender;Guarded/taut  -ND       Head/Neck/Trunk    Trunk Extension AROM 50%; pain; hinges at L3  -ND    Trunk Flexion AROM 75%; pain  -ND    Trunk Lt Lateral Flexion AROM 75%  -ND    Trunk Rt Lateral Flexion AROM 50%; pain; \"pulling on right\"  -ND    Trunk Lt Rotation AROM 75%  -ND    Trunk Rt Rotation AROM 50%; pain  -ND              User Key  (r) = Recorded By, (t) = Taken By, (c) = Cosigned By      Initials Name Provider Type    Chaz Martin, PT Physical Therapist                                Therapy Education  Education Details: Involved anatomy, pathology, diagnosis, prognosis, POC  Given: HEP, Symptoms/condition management, Pain management, Posture/body mechanics  Program: New  How Provided: Verbal  Provided to: Patient  Level of Understanding: Verbalized      PT OP Goals       Row Name 08/16/23 1500          PT Short Term Goals    STG Date to Achieve 09/06/23  -ND     STG 1 Pt to demonstrate I with HEP for lumbopelvic mobility and stabilization for decreased back pain.  -ND     STG 2 Pt to demonstrate lumbar right rotation and side bending to WFL w/ no c/o pain.  -ND     STG 3 Pt to perform 10 bridges with no c/o back pain to demonstrate improved lumbopelvic stability.  -ND     STG 4 Pt to demonstrate I with MET for self-innominate rotation correction.  -ND        Long Term Goals    LTG Date to Achieve --  LTG deferred.  -ND        Time Calculation    PT Goal Re-Cert Due Date 09/06/23  -ND               User Key  (r) = Recorded By, (t) = Taken By, (c) = Cosigned By      Initials Name Provider Type    Chaz Martin, PT Physical Therapist                     PT Assessment/Plan       Row Name 08/16/23 1500          PT Assessment    Functional Limitations Impaired locomotion;Limitation in home management;Limitations in community activities;Limitations in functional capacity and performance;Performance in leisure activities;Performance in work activities  " -ND     Impairments Pain;Posture;Range of motion;Poor body mechanics;Muscle strength;Joint mobility;Joint integrity;Impaired muscle power;Impaired muscle length;Impaired muscle endurance;Impaired flexibility;Gait  -ND     Assessment Comments Pt is a 28 y.o. female that presents to PT with c/o acute on chronic right side low back pain. Pt presents with increased tone and tenderness through right lumbopelvic musculature with deficits in segemental mobility of lumbar spine. Pt with noted LLD and posterior rotation of right innominate that corrected with MET. Pt would benefit from skilled PT intervention to improve lumbopelvic mobility and stabilization to decrease pain with ADL's and functional mobility.  -ND     Rehab Potential Fair  Barrier: Chronicity, Previous PT  -ND     Patient/caregiver participated in establishment of treatment plan and goals Yes  -ND     Patient would benefit from skilled therapy intervention Yes  -ND        PT Plan    PT Frequency 2x/week  -ND     Predicted Duration of Therapy Intervention (PT) 4 weeks with more TBD  -ND     Planned CPT's? PT EVAL LOW COMPLEXITY: 97266;PT RE-EVAL: 85751;PT THER PROC EA 15 MIN: 37458;PT THER ACT EA 15 MIN: 40134;PT MANUAL THERAPY EA 15 MIN: 14831;PT NEUROMUSC RE-EDUCATION EA 15 MIN: 95790;PT GAIT TRAINING EA 15 MIN: 88689;PT AQUATIC THERAPY EA 15 MIN: 08820;PT SELF CARE/HOME MGMT/TRAIN EA 15: 92668;PT HOT OR COLD PACK TREAT MCARE;PT ELECTRICAL STIM UNATTEND: ;PT ULTRASOUND EA 15 MIN: 95765;PT TRACTION LUMBAR: 75540;PT SELF CARE/MGMT/TRAIN 15 MIN: 64104;PT THER SUPP EA 15 MIN  -ND     PT Plan Comments Progress with lumbopelvic mobility and stabilization as tolerated. Manual for MFR to right lumbopelvic mm. May benefit from TDN. Modalities as needed for pain.  -ND               User Key  (r) = Recorded By, (t) = Taken By, (c) = Cosigned By      Initials Name Provider Type    Chaz Martin, PT Physical Therapist                       OP Exercises       Row  Name 08/16/23 1500             Subjective Comments    Subjective Comments See Patient History  -ND         Subjective Pain    Able to rate subjective pain? yes  -ND      Pre-Treatment Pain Level 8  -ND         Exercise 1    Exercise Name 1 Eval/POC  -ND                User Key  (r) = Recorded By, (t) = Taken By, (c) = Cosigned By      Initials Name Provider Type    ND Chaz Underwood, PT Physical Therapist                                            Time Calculation:     Start Time: 1517  Stop Time: 1603  Time Calculation (min): 46 min  Untimed Charges  PT Eval/Re-eval Minutes: 46  Total Minutes  Untimed Charges Total Minutes: 46   Total Minutes: 46     Therapy Charges for Today       Code Description Service Date Service Provider Modifiers Qty    34143940754 HC PT EVAL LOW COMPLEXITY 4 8/16/2023 Chaz Underwood, PT GP 1                      Chaz Underwood PT  8/16/2023

## 2023-08-23 ENCOUNTER — HOSPITAL ENCOUNTER (OUTPATIENT)
Dept: PHYSICAL THERAPY | Facility: HOSPITAL | Age: 28
Setting detail: THERAPIES SERIES
Discharge: HOME | End: 2023-08-23
Payer: COMMERCIAL

## 2023-08-23 DIAGNOSIS — G89.29 CHRONIC BILATERAL LOW BACK PAIN WITHOUT SCIATICA: Primary | ICD-10-CM

## 2023-08-23 DIAGNOSIS — M54.50 CHRONIC BILATERAL LOW BACK PAIN WITHOUT SCIATICA: Primary | ICD-10-CM

## 2023-08-23 PROCEDURE — 97140 MANUAL THERAPY 1/> REGIONS: CPT

## 2023-08-23 PROCEDURE — 97110 THERAPEUTIC EXERCISES: CPT

## 2023-08-23 NOTE — THERAPY TREATMENT NOTE
Outpatient Physical Therapy Ortho Treatment Note  [facility]     Patient Name: Marleny Conn  : 1995  MRN: [id number]  Today's Date: 2023      Visit Date: 2023    Attendance: 2/2 (7 visits authorized)  Subjective % improved: N/A  Recert Due Date: 23  MD appt: 23     Therapy diagnosis: Acute on chronic right side low back pain    Visit Dx:    ICD-10-CM ICD-9-CM   1. Chronic bilateral low back pain without sciatica  M54.50 724.2    G89.29 338.29       Patient Active Problem List   Diagnosis    Morbidly obese    Epigastric pain    Change in bowel habits    Diarrhea    Generalized abdominal pain    Menorrhagia with irregular cycle    Pelvic pain    Gastroesophageal reflux disease with esophagitis without hemorrhage    Early satiety    Chronic tonsillitis    Tonsillith        Past Medical History:   Diagnosis Date    Acid reflux     Allergic rhinitis     Anxiety     Benign intracranial hypertension     Bronchitis     pt denies    Common cold     Conjunctivitis     Dysfunction of eustachian tube     Examination of eyes and vision     general; NORMAL    Gastroenteritis     Headache     Hemorrhoids     likely    Infection of skin and subcutaneous tissue     Migraine     Nonvenomous insect bite     Open wound of lower limb     Pharyngitis     Rhinitis     Tick bite     URI (upper respiratory infection)         Past Surgical History:   Procedure Laterality Date    COLONOSCOPY N/A 1/15/2020    Procedure: COLONOSCOPY;  Surgeon: Sterling Neal MD;  Location: [facility] ENDOSCOPY;  Service: Gastroenterology    ENDOSCOPY N/A 2019    Procedure: ESOPHAGOGASTRODUODENOSCOPY;  Surgeon: Sterling Neal MD;  Location: [facility] ENDOSCOPY;  Service: Gastroenterology    SALPINGECTOMY Bilateral 2018    Procedure: SALPINGECTOMY LAPAROSCOPIC;  Surgeon: FridayElizabeth MD;  Location: [facility] OR;  Service: Obstetrics/Gynecology    TONSILLECTOMY Bilateral 1/3/2023    Procedure: TONSILLECTOMY;   Surgeon: Tasneem Cruz MD;  Location: [facility];  Service: ENT;  Laterality: Bilateral;    WISDOM TOOTH EXTRACTION          PT Ortho       Row Name 08/23/23 1300       Subjective Comments    Subjective Comments Pt reports aggrevation of her back pain following her eval. She's been in constant 9/10 pain since then. The pain is mostly located on the right side of her low back and upper hip. She denies any N/T in her LE's. Pt reports that her feet are always ice cold and at times her toes will turn purple.  -ND       Precautions and Contraindications    Precautions Benign intracranial hypertension  -ND       Subjective Pain    Able to rate subjective pain? yes  -ND    Pre-Treatment Pain Level 9  -ND       Posture/Observations    Posture/Observations Comments LLD (R>L). Right innominate poteriorly rotated. Hypertonicity of right lumbar paraspinals and QL.  -ND              User Key  (r) = Recorded By, (t) = Taken By, (c) = Cosigned By      Initials Name Provider Type    ND Chaz Underwood, PT Physical Therapist                                 PT Assessment/Plan       Row Name 08/23/23 1300          PT Assessment    Functional Limitations Impaired locomotion;Limitation in home management;Limitations in community activities;Limitations in functional capacity and performance;Performance in leisure activities;Performance in work activities  -ND     Impairments Pain;Posture;Range of motion;Poor body mechanics;Muscle strength;Joint mobility;Joint integrity;Impaired muscle power;Impaired muscle length;Impaired muscle endurance;Impaired flexibility;Gait  -ND     Assessment Comments Pt presented with increased right side low back pain. Pt with noted LLD and posterior rotation of right innominate that corrected with MET. Pt was still noted to have a slight LLD afterwards. She was fitted with a heel lift at the end of today's therapy session and said that this made her hips feel more level. Pt with noted hypertonicity of right lumbar  "paraspinals and QL. No significant change in back pain with manual lumbar traction. Pt was progressed with lumbopelvic stability exercises with good tolerance and provided written HEP.  -ND     Rehab Potential Fair  Barrier: Chronicity, Previous PT  -ND     Patient/caregiver participated in establishment of treatment plan and goals Yes  -ND     Patient would benefit from skilled therapy intervention Yes  -ND        PT Plan    PT Frequency 2x/week  -ND     Predicted Duration of Therapy Intervention (PT) 4 weeks with more TBD  -ND     PT Plan Comments Monitor response to wearing hill lift. Progress with lumbopelvic stability as tolerated.  -ND               User Key  (r) = Recorded By, (t) = Taken By, (c) = Cosigned By      Initials Name Provider Type    ND Chaz Underwood, PT Physical Therapist                       OP Exercises       Row Name 08/23/23 1300             Subjective Comments    Subjective Comments Pt reports aggrevation of her back pain following her eval. She's been in constant 9/10 pain since then. The pain is mostly located on the right side of her low back and upper hip. She denies any N/T in her LE's. Pt reports that her feet are always ice cold and at times her toes will turn purple.  -ND         Subjective Pain    Able to rate subjective pain? yes  -ND      Pre-Treatment Pain Level 9  -ND         Total Minutes    26283 - PT Therapeutic Exercise Minutes 31  -ND      57727 - PT Manual Therapy Minutes 20  -ND         Exercise 1    Exercise Name 1 Pro II, Lvl 3.0 strength  -ND      Cueing 1 Verbal  -ND      Time 1 6'  -ND         Exercise 2    Exercise Name 2 MET for posterior rotation of right innominate  -ND         Exercise 3    Exercise Name 3 See Manual  -ND         Exercise 4    Exercise Name 4 Hooklying SKTC  -ND      Cueing 4 Verbal;Demo  -ND      Sets 4 1  -ND      Reps 4 5  -ND      Time 4 10\" hold  -ND      Additional Comments BLE  -ND         Exercise 5    Exercise Name 5 Posterior Pelvic " "Tilt  -ND      Cueing 5 Verbal;Tactile  -ND      Sets 5 2  -ND      Reps 5 10  -ND      Time 5 5\" hold  -ND         Exercise 6    Exercise Name 6 Bridges  -ND      Cueing 6 Verbal  -ND      Sets 6 2  -ND      Reps 6 10  -ND         Exercise 7    Exercise Name 7 Fitted for heel lift in left shoe  -ND                User Key  (r) = Recorded By, (t) = Taken By, (c) = Cosigned By      Initials Name Provider Type    Chaz Martin, PT Physical Therapist                             Manual Rx (last 36 hours)       Manual Treatments       Row Name 08/23/23 1500 08/23/23 1300          Total Minutes    19024 - PT Manual Therapy Minutes -- 20  -ND        Manual Rx 1    Manual Rx 1 Location Right lumbar paraspinals & QL  -ND --     Manual Rx 1 Type MFR  -ND --        Manual Rx 2    Manual Rx 2 Location Lumbar spine  -ND --     Manual Rx 2 Type Manual traction  -ND --     Manual Rx 2 Duration 5'  -ND --               User Key  (r) = Recorded By, (t) = Taken By, (c) = Cosigned By      Initials Name Provider Type    Chaz Martin, PT Physical Therapist                     PT OP Goals       Row Name 08/23/23 1300          PT Short Term Goals    STG Date to Achieve 09/06/23  -ND     STG 1 Pt to demonstrate I with HEP for lumbopelvic mobility and stabilization for decreased back pain.  -ND     STG 2 Pt to demonstrate lumbar right rotation and side bending to WFL w/ no c/o pain.  -ND     STG 3 Pt to perform 10 bridges with no c/o back pain to demonstrate improved lumbopelvic stability.  -ND     STG 4 Pt to demonstrate I with MET for self-innominate rotation correction.  -ND        Long Term Goals    LTG Date to Achieve --  LTG deferred.  -ND        Time Calculation    PT Goal Re-Cert Due Date 09/06/23  -ND               User Key  (r) = Recorded By, (t) = Taken By, (c) = Cosigned By      Initials Name Provider Type    Chaz Martin, PT Physical Therapist                    Therapy Education  Education Details: Addition of SKTC, " PPT, and bridges to HEP. Pt educated on heel lift wear.  Given: HEP, Posture/body mechanics  Program: New  How Provided: Verbal, Demonstration, Written  Provided to: Patient  Level of Understanding: Teach back education performed, Demonstrated              Time Calculation:   Start Time: 1347  Stop Time: 1438  Time Calculation (min): 51 min  Timed Charges  28251 - PT Therapeutic Exercise Minutes: 31  09881 - PT Manual Therapy Minutes: 20  Total Minutes  Timed Charges Total Minutes: 51   Total Minutes: 51  Therapy Charges for Today       Code Description Service Date Service Provider Modifiers Qty    22467911431 HC PT THER PROC EA 15 MIN 8/23/2023 Chaz Underwood, PT GP 2    25357325843 HC PT MANUAL THERAPY EA 15 MIN 8/23/2023 Chaz Underwood, PT GP 1                      Chaz Underwood PT  8/23/2023

## 2023-08-25 ENCOUNTER — HOSPITAL ENCOUNTER (OUTPATIENT)
Dept: PHYSICAL THERAPY | Facility: HOSPITAL | Age: 28
Setting detail: THERAPIES SERIES
Discharge: HOME | End: 2023-08-25
Payer: COMMERCIAL

## 2023-08-25 DIAGNOSIS — M54.50 CHRONIC BILATERAL LOW BACK PAIN WITHOUT SCIATICA: Primary | ICD-10-CM

## 2023-08-25 DIAGNOSIS — G89.29 CHRONIC BILATERAL LOW BACK PAIN WITHOUT SCIATICA: Primary | ICD-10-CM

## 2023-08-25 PROCEDURE — G0283 ELEC STIM OTHER THAN WOUND: HCPCS

## 2023-08-25 PROCEDURE — 97110 THERAPEUTIC EXERCISES: CPT

## 2023-08-25 NOTE — THERAPY TREATMENT NOTE
Outpatient Physical Therapy Ortho Treatment Note  [facility]     Patient Name: Marleny Conn  : 1995  MRN: [id number]  Today's Date: 2023      Visit Date: 2023    Subjective Improvement 0  Visits 3/3  Visits approved 7  RTMD after therapy  Recert Date 2023      Therapy diagnosis: Acute on chronic right side low back pain     Visit Dx:    ICD-10-CM ICD-9-CM   1. Chronic bilateral low back pain without sciatica  M54.50 724.2    G89.29 338.29       Patient Active Problem List   Diagnosis    Morbidly obese    Epigastric pain    Change in bowel habits    Diarrhea    Generalized abdominal pain    Menorrhagia with irregular cycle    Pelvic pain    Gastroesophageal reflux disease with esophagitis without hemorrhage    Early satiety    Chronic tonsillitis    Tonsillith        Past Medical History:   Diagnosis Date    Acid reflux     Allergic rhinitis     Anxiety     Benign intracranial hypertension     Bronchitis     pt denies    Common cold     Conjunctivitis     Dysfunction of eustachian tube     Examination of eyes and vision     general; NORMAL    Gastroenteritis     Headache     Hemorrhoids     likely    Infection of skin and subcutaneous tissue     Migraine     Nonvenomous insect bite     Open wound of lower limb     Pharyngitis     Rhinitis     Tick bite     URI (upper respiratory infection)         Past Surgical History:   Procedure Laterality Date    COLONOSCOPY N/A 1/15/2020    Procedure: COLONOSCOPY;  Surgeon: Sterling Neal MD;  Location: [facility] ENDOSCOPY;  Service: Gastroenterology    ENDOSCOPY N/A 2019    Procedure: ESOPHAGOGASTRODUODENOSCOPY;  Surgeon: Sterling Neal MD;  Location: [facility] ENDOSCOPY;  Service: Gastroenterology    SALPINGECTOMY Bilateral 2018    Procedure: SALPINGECTOMY LAPAROSCOPIC;  Surgeon: FridayElizabeth MD;  Location: [facility] OR;  Service: Obstetrics/Gynecology    TONSILLECTOMY Bilateral 1/3/2023    Procedure: TONSILLECTOMY;  Surgeon:  Tasneem Cruz MD;  Location: [facility];  Service: ENT;  Laterality: Bilateral;    WISDOM TOOTH EXTRACTION          PT Ortho       Row Name 08/25/23 1100       Subjective Comments    Subjective Comments Patient states that her back pain today is a 9/10.  Usually, it is a 10/10  -CP       Precautions and Contraindications    Precautions Benign intracranial hypertension  -CP       Subjective Pain    Able to rate subjective pain? yes  -CP    Pre-Treatment Pain Level 9  -CP       Posture/Observations    Posture/Observations Comments NAG  -CP              User Key  (r) = Recorded By, (t) = Taken By, (c) = Cosigned By      Initials Name Provider Type    Dominique Nguyen, PTA Physical Therapist Assistant                                 PT Assessment/Plan       Row Name 08/25/23 1226          PT Assessment    Assessment Comments No SI rotation noted this date.   patient cont to report increase pain.  She is wearing heel lift in left shoe.  Todays ther ex focus on decreasing pain  -CP        PT Plan    PT Frequency 2x/week  -CP     Predicted Duration of Therapy Intervention (PT) 4 weeks  -CP     PT Plan Comments Cont with POC.  -CP               User Key  (r) = Recorded By, (t) = Taken By, (c) = Cosigned By      Initials Name Provider Type    Dominique Nguyen, PTA Physical Therapist Assistant                     Modalities       Row Name 08/25/23 1100             Subjective Pain    Post-Treatment Pain Level 9  -CP         Moist Heat    MH Applied Yes  -CP      Location low back in sitting with IFC  -CP      PT Moist Heat Minutes 15  -CP      MH S/P Rx Yes  -CP         ELECTRICAL STIMULATION    Attended/Unattended Unattended  -CP      Stimulation Type IFC  -CP      Location/Electrode Placement/Other low back in sitting  -CP      PT E-Stim Unattended Minutes 15  -CP                User Key  (r) = Recorded By, (t) = Taken By, (c) = Cosigned By      Initials Name Provider Type    Dominique Nguyen, PTA Physical  "Therapist Assistant                   OP Exercises       Row Name 08/25/23 1100             Subjective Comments    Subjective Comments Patient states that her back pain today is a 9/10.  Usually, it is a 10/10  -CP         Subjective Pain    Able to rate subjective pain? yes  -CP      Pre-Treatment Pain Level 9  -CP      Post-Treatment Pain Level 9  -CP         Total Minutes    14300 - PT Therapeutic Exercise Minutes 30  -CP         Exercise 1    Exercise Name 1 Pro II level 3  -CP      Time 1 10  -CP         Exercise 2    Exercise Name 2 incline stretch  -CP      Cueing 2 Verbal;Demo  -CP      Sets 2 3  -CP      Time 2 30\" holds  -CP         Exercise 3    Exercise Name 3 standing Hs stretch  -CP      Cueing 3 Verbal;Demo  -CP      Sets 3 3  -CP      Time 3 30\" holds  -CP      Additional Comments B LE  -CP         Exercise 4    Exercise Name 4 LTR stretch  -CP      Cueing 4 Verbal;Tactile  -CP      Reps 4 20  -CP         Exercise 5    Exercise Name 5 bridging  -CP      Cueing 5 Verbal;Tactile  -CP      Sets 5 2  -CP      Reps 5 10  -CP         Exercise 6    Exercise Name 6 see manual  -CP                User Key  (r) = Recorded By, (t) = Taken By, (c) = Cosigned By      Initials Name Provider Type    CP Dominique Cohn, PTA Physical Therapist Assistant                                  PT OP Goals       Row Name 08/25/23 1200          PT Short Term Goals    STG Date to Achieve 09/06/23  -CP     STG 1 Pt to demonstrate I with HEP for lumbopelvic mobility and stabilization for decreased back pain.  -CP     STG 2 Pt to demonstrate lumbar right rotation and side bending to WFL w/ no c/o pain.  -CP     STG 3 Pt to perform 10 bridges with no c/o back pain to demonstrate improved lumbopelvic stability.  -CP     STG 4 Pt to demonstrate I with MET for self-innominate rotation correction.  -CP        Long Term Goals    LTG Date to Achieve --  LTG deferred.  -CP        Time Calculation    PT Goal Re-Cert Due Date 09/06/23  " -CP               User Key  (r) = Recorded By, (t) = Taken By, (c) = Cosigned By      Initials Name Provider Type    CP Dominique Cohn, PTA Physical Therapist Assistant                    Therapy Education  Education Details: LTR Stretch  Given: HEP  Program: New  How Provided: Verbal, Demonstration  Provided to: Patient  Level of Understanding: Teach back education performed, Verbalized, Demonstrated              Time Calculation:   Start Time: 1145  Stop Time: 1233  Time Calculation (min): 48 min  Total Timed Code Minutes- PT: 30 minute(s)  Timed Charges  55283 - PT Therapeutic Exercise Minutes: 30  Untimed Charges  PT E-Stim Unattended Minutes: 15  PT Moist Heat Minutes: 15  Total Minutes  Timed Charges Total Minutes: 30  Untimed Charges Total Minutes: 30   Total Minutes: 60  Therapy Charges for Today       Code Description Service Date Service Provider Modifiers Qty    43211199569 HC PT THER PROC EA 15 MIN 8/25/2023 Dominique Cohn PTA GP 2    51121011727 HC PT ELECTRICAL STIM UNATTENDED 8/25/2023 Dominique Cohn, PTA  1                      Dominique Cohn PTA  8/25/2023

## 2023-08-30 ENCOUNTER — HOSPITAL ENCOUNTER (OUTPATIENT)
Dept: PHYSICAL THERAPY | Facility: HOSPITAL | Age: 28
Setting detail: THERAPIES SERIES
Discharge: HOME | End: 2023-08-30
Payer: COMMERCIAL

## 2023-08-30 DIAGNOSIS — M54.50 CHRONIC BILATERAL LOW BACK PAIN WITHOUT SCIATICA: Primary | ICD-10-CM

## 2023-08-30 DIAGNOSIS — G89.29 CHRONIC BILATERAL LOW BACK PAIN WITHOUT SCIATICA: Primary | ICD-10-CM

## 2023-08-30 PROCEDURE — 97140 MANUAL THERAPY 1/> REGIONS: CPT

## 2023-08-30 PROCEDURE — 97110 THERAPEUTIC EXERCISES: CPT

## 2023-08-30 NOTE — THERAPY TREATMENT NOTE
Outpatient Physical Therapy Ortho Treatment Note  [facility]     Patient Name: Marleny Conn  : 1995  MRN: [id number]  Today's Date: 2023      Visit Date: 2023    Subjective Improvement 0  Visits 4/4  Visits approved 7  RTMD after therapy  Recert Date 2023        Therapy diagnosis: Acute on chronic right side low back pain     Visit Dx:    ICD-10-CM ICD-9-CM   1. Chronic bilateral low back pain without sciatica  M54.50 724.2    G89.29 338.29       Patient Active Problem List   Diagnosis    Morbidly obese    Epigastric pain    Change in bowel habits    Diarrhea    Generalized abdominal pain    Menorrhagia with irregular cycle    Pelvic pain    Gastroesophageal reflux disease with esophagitis without hemorrhage    Early satiety    Chronic tonsillitis    Tonsillith        Past Medical History:   Diagnosis Date    Acid reflux     Allergic rhinitis     Anxiety     Benign intracranial hypertension     Bronchitis     pt denies    Common cold     Conjunctivitis     Dysfunction of eustachian tube     Examination of eyes and vision     general; NORMAL    Gastroenteritis     Headache     Hemorrhoids     likely    Infection of skin and subcutaneous tissue     Migraine     Nonvenomous insect bite     Open wound of lower limb     Pharyngitis     Rhinitis     Tick bite     URI (upper respiratory infection)         Past Surgical History:   Procedure Laterality Date    COLONOSCOPY N/A 1/15/2020    Procedure: COLONOSCOPY;  Surgeon: Sterling Neal MD;  Location: [facility] ENDOSCOPY;  Service: Gastroenterology    ENDOSCOPY N/A 2019    Procedure: ESOPHAGOGASTRODUODENOSCOPY;  Surgeon: Sterling Neal MD;  Location: [facility] ENDOSCOPY;  Service: Gastroenterology    SALPINGECTOMY Bilateral 2018    Procedure: SALPINGECTOMY LAPAROSCOPIC;  Surgeon: FridayElizabeth MD;  Location: [facility] OR;  Service: Obstetrics/Gynecology    TONSILLECTOMY Bilateral 1/3/2023    Procedure: TONSILLECTOMY;  Surgeon:  Tasneem Cruz MD;  Location: [facility];  Service: ENT;  Laterality: Bilateral;    WISDOM TOOTH EXTRACTION          PT Ortho       Row Name 08/30/23 1300       Subjective Comments    Subjective Comments Pt reports that following having IFC performed at her last visit she had increased pain for the next 48 hours. She has been wearing the heel lift in her left shoe with no observed benefits.  -ND       Precautions and Contraindications    Precautions Benign intracranial hypertension  -ND       Subjective Pain    Able to rate subjective pain? yes  -ND    Pre-Treatment Pain Level 9  -ND    Post-Treatment Pain Level 9  -ND              User Key  (r) = Recorded By, (t) = Taken By, (c) = Cosigned By      Initials Name Provider Type    ND Chaz Underwood, PT Physical Therapist                                 PT Assessment/Plan       Row Name 08/30/23 1300          PT Assessment    Functional Limitations Impaired locomotion;Limitation in home management;Limitations in community activities;Limitations in functional capacity and performance;Performance in leisure activities;Performance in work activities  -ND     Impairments Pain;Posture;Range of motion;Poor body mechanics;Muscle strength;Joint mobility;Joint integrity;Impaired muscle power;Impaired muscle length;Impaired muscle endurance;Impaired flexibility;Gait  -ND     Assessment Comments Pt with good tolerance for dry needling to lumbar multifidi and right gluteals. Pt noted to have increased tone through left lumbar paraspinals as compared to right with needling. Pt reported some soreness immediately afterwards. Pt able to perform pelvic tilts on physioball for lumbopelvic motor control.  -ND     Rehab Potential Fair  Barrier: Chronicity, Previous PT  -ND     Patient/caregiver participated in establishment of treatment plan and goals Yes  -ND     Patient would benefit from skilled therapy intervention Yes  -ND        PT Plan    PT Frequency 2x/week  -ND     Predicted  Duration of Therapy Intervention (PT) 4 weeks with more TBD  -ND     PT Plan Comments Monitor response to TDN. Perform mechanical traction at next visit if no decrease in pain.  -ND               User Key  (r) = Recorded By, (t) = Taken By, (c) = Cosigned By      Initials Name Provider Type    Chaz Martin, PT Physical Therapist                       OP Exercises       Row Name 08/30/23 1300             Subjective Comments    Subjective Comments Pt reports that following having IFC performed at her last visit she had increased pain for the next 48 hours. She has been wearing the heel lift in her left shoe with no observed benefits.  -ND         Subjective Pain    Able to rate subjective pain? yes  -ND      Pre-Treatment Pain Level 9  -ND      Post-Treatment Pain Level 9  -ND         Total Minutes    55673 - PT Therapeutic Exercise Minutes 17  -ND      78652 - PT Manual Therapy Minutes 30  -ND         Exercise 1    Exercise Name 1 See Manual  -ND      Time 1 30  -ND         Exercise 2    Exercise Name 2 Pelvic Tilts on physioball  -ND      Cueing 2 Verbal;Tactile  -ND      Sets 2 2  -ND      Reps 2 10  -ND         Exercise 3    Exercise Name 3 Pro II, Lvl 3.0  -ND      Cueing 3 Verbal  -ND      Time 3 5'  -ND                User Key  (r) = Recorded By, (t) = Taken By, (c) = Cosigned By      Initials Name Provider Type    Chaz Martin, PT Physical Therapist                             Manual Rx (last 36 hours)       Manual Treatments       Row Name 08/30/23 1300             Total Minutes    10652 - PT Manual Therapy Minutes 30  -ND         Manual Rx 1    Manual Rx 1 Location Bilateral L3-5 multifidi  -ND      Manual Rx 1 Type TDN/MFR  -ND         Manual Rx 2    Manual Rx 2 Location Right glutes  -ND      Manual Rx 2 Type TDN  -ND                User Key  (r) = Recorded By, (t) = Taken By, (c) = Cosigned By      Initials Name Provider Type    Chaz Martin, PT Physical Therapist                     PT OP Goals        Row Name 08/30/23 1300          PT Short Term Goals    STG Date to Achieve 09/06/23  -ND     STG 1 Pt to demonstrate I with HEP for lumbopelvic mobility and stabilization for decreased back pain.  -ND     STG 2 Pt to demonstrate lumbar right rotation and side bending to WFL w/ no c/o pain.  -ND     STG 3 Pt to perform 10 bridges with no c/o back pain to demonstrate improved lumbopelvic stability.  -ND     STG 4 Pt to demonstrate I with MET for self-innominate rotation correction.  -ND        Long Term Goals    LTG Date to Achieve --  LTG deferred.  -ND        Time Calculation    PT Goal Re-Cert Due Date 09/06/23  -ND               User Key  (r) = Recorded By, (t) = Taken By, (c) = Cosigned By      Initials Name Provider Type    ND Chaz Underwood, PT Physical Therapist                                   Time Calculation:   Start Time: 1300  Stop Time: 1347  Time Calculation (min): 47 min  Total Timed Code Minutes- PT: 47 minute(s)  Timed Charges  56154 - PT Therapeutic Exercise Minutes: 17  95650 - PT Manual Therapy Minutes: 30  Total Minutes  Timed Charges Total Minutes: 47   Total Minutes: 47  Therapy Charges for Today       Code Description Service Date Service Provider Modifiers Qty    64665749842 HC PT THER PROC EA 15 MIN 8/30/2023 Chaz Underwood, PT GP 1    71628415515 HC PT MANUAL THERAPY EA 15 MIN 8/30/2023 Chaz Underwood, PT GP 2                      Chaz Underwood PT  8/30/2023

## 2023-09-01 ENCOUNTER — HOSPITAL ENCOUNTER (OUTPATIENT)
Dept: PHYSICAL THERAPY | Facility: HOSPITAL | Age: 28
Setting detail: THERAPIES SERIES
Discharge: HOME | End: 2023-09-01
Payer: COMMERCIAL

## 2023-09-01 DIAGNOSIS — M54.50 CHRONIC BILATERAL LOW BACK PAIN WITHOUT SCIATICA: Primary | ICD-10-CM

## 2023-09-01 DIAGNOSIS — G89.29 CHRONIC BILATERAL LOW BACK PAIN WITHOUT SCIATICA: Primary | ICD-10-CM

## 2023-09-01 PROCEDURE — 97110 THERAPEUTIC EXERCISES: CPT

## 2023-09-01 NOTE — THERAPY TREATMENT NOTE
Outpatient Physical Therapy Ortho Treatment Note  [facility]     Patient Name: Marleny Conn  : 1995  MRN: [id number]  Today's Date: 2023      Visit Date: 2023    Subjective Improvement 0  Visits 5/5  Visits approved 7  RTMD 23  Recert Date 2023        Therapy diagnosis: Acute on chronic right side low back pain     Visit Dx:    ICD-10-CM ICD-9-CM   1. Chronic bilateral low back pain without sciatica  M54.50 724.2    G89.29 338.29       Patient Active Problem List   Diagnosis    Morbidly obese    Epigastric pain    Change in bowel habits    Diarrhea    Generalized abdominal pain    Menorrhagia with irregular cycle    Pelvic pain    Gastroesophageal reflux disease with esophagitis without hemorrhage    Early satiety    Chronic tonsillitis    Tonsillith        Past Medical History:   Diagnosis Date    Acid reflux     Allergic rhinitis     Anxiety     Benign intracranial hypertension     Bronchitis     pt denies    Common cold     Conjunctivitis     Dysfunction of eustachian tube     Examination of eyes and vision     general; NORMAL    Gastroenteritis     Headache     Hemorrhoids     likely    Infection of skin and subcutaneous tissue     Migraine     Nonvenomous insect bite     Open wound of lower limb     Pharyngitis     Rhinitis     Tick bite     URI (upper respiratory infection)         Past Surgical History:   Procedure Laterality Date    COLONOSCOPY N/A 1/15/2020    Procedure: COLONOSCOPY;  Surgeon: Sterling Neal MD;  Location: [facility] ENDOSCOPY;  Service: Gastroenterology    ENDOSCOPY N/A 2019    Procedure: ESOPHAGOGASTRODUODENOSCOPY;  Surgeon: Sterling Neal MD;  Location: [facility] ENDOSCOPY;  Service: Gastroenterology    SALPINGECTOMY Bilateral 2018    Procedure: SALPINGECTOMY LAPAROSCOPIC;  Surgeon: FridayElizabeth MD;  Location: [facility] OR;  Service: Obstetrics/Gynecology    TONSILLECTOMY Bilateral 1/3/2023    Procedure: TONSILLECTOMY;  Surgeon: Tasneem Cruz  MD MATEO;  Location: [facility];  Service: ENT;  Laterality: Bilateral;    WISDOM TOOTH EXTRACTION          PT Ortho       Row Name 09/01/23 1100       Subjective Comments    Subjective Comments Pt reports decreased back pain to 3/10 yesterday following dry needling on Wednesday. However, yesterday evening her back started to hurt again and she was in 9/10 pain once more by the time she went to bed.  -ND       Precautions and Contraindications    Precautions Benign intracranial hypertension  -ND       Subjective Pain    Able to rate subjective pain? yes  -ND    Pre-Treatment Pain Level 9  -ND    Post-Treatment Pain Level 9  -ND              User Key  (r) = Recorded By, (t) = Taken By, (c) = Cosigned By      Initials Name Provider Type    ND Chaz Underwood PT Physical Therapist                                 PT Assessment/Plan       Row Name 09/01/23 1100          PT Assessment    Functional Limitations Impaired locomotion;Limitation in home management;Limitations in community activities;Limitations in functional capacity and performance;Performance in leisure activities;Performance in work activities  -ND     Impairments Pain;Posture;Range of motion;Poor body mechanics;Muscle strength;Joint mobility;Joint integrity;Impaired muscle power;Impaired muscle length;Impaired muscle endurance;Impaired flexibility;Gait  -ND     Assessment Comments Pt with limited carry over from dry needling at lession session to 24 hours with back pain at baseline upon arrival today. Pt with poor tolerance to mechanical lumbar traction with c/o increasing mid-line and right SIJ pain. Traction was discontinued after 4 minutes. Pt was noted to have anterior rotation of right innominant that corrected with MET. TherEx focussed on pelvic girdle stability. Pt was instructed to only perform the two new interventions for HEP over the weekend.  -ND     Rehab Potential Fair  Barrier: Chronicity, Previous PT  -ND     Patient/caregiver participated in  establishment of treatment plan and goals Yes  -ND     Patient would benefit from skilled therapy intervention Yes  -ND        PT Plan    PT Frequency 2x/week  -ND     Predicted Duration of Therapy Intervention (PT) 4 weeks with more TBD  -ND     PT Plan Comments Pt scheduled for f/u with referring MD on 9/5. Pt has had poor response to PT to date. Continue with current POC for lumbopelvic stability. 2 authorized visits remaining. Pt may benefit more from aquatic therapy following re-cert.  -ND               User Key  (r) = Recorded By, (t) = Taken By, (c) = Cosigned By      Initials Name Provider Type    Chaz Martin, PT Physical Therapist                     Modalities       Row Name 09/01/23 1100             Traction 73261    Traction Type Lumbar  -ND      PT Traction Rx Minutes 4  -ND      Duration Intermittent  -ND      Position Hook-lying  -ND      Weight --  max 60#, min 30#  -ND      Hold 60  -ND      Relax 20  -ND      Progression 1  -ND      Regression 1  -ND                User Key  (r) = Recorded By, (t) = Taken By, (c) = Cosigned By      Initials Name Provider Type    Chaz Martin, PT Physical Therapist                   OP Exercises       Row Name 09/01/23 1100             Subjective Comments    Subjective Comments Pt reports decreased back pain to 3/10 yesterday following dry needling on Wednesday. However, yesterday evening her back started to hurt again and she was in 9/10 pain once more by the time she went to bed.  -ND         Subjective Pain    Able to rate subjective pain? yes  -ND      Pre-Treatment Pain Level 9  -ND      Post-Treatment Pain Level 9  -ND         Total Minutes    42670 - PT Therapeutic Exercise Minutes 42  -ND         Exercise 1    Exercise Name 1 Attempted Mechanical Traction  -ND      Cueing 1 Verbal  -ND      Time 1 15'  -ND         Exercise 2    Exercise Name 2 MET for anterior pelvic tilt of right innominant  -ND      Cueing 2 Verbal;Tactile  -ND         Exercise 3     "Exercise Name 3 Hooklying unilateral hip abduction  -ND      Cueing 3 Verbal;Tactile  -ND      Sets 3 2  -ND      Reps 3 10  -ND      Time 3 2\" hold  -ND      Additional Comments GTB  -ND         Exercise 4    Exercise Name 4 Hooklying PPT with adduction squeeze  -ND      Cueing 4 Verbal;Tactile  -ND      Sets 4 2  -ND      Reps 4 10  -ND      Time 4 5\" hold  -ND                User Key  (r) = Recorded By, (t) = Taken By, (c) = Cosigned By      Initials Name Provider Type    Chaz Martin, PT Physical Therapist                                  PT OP Goals       Row Name 09/01/23 1100          PT Short Term Goals    STG Date to Achieve 09/06/23  -ND     STG 1 Pt to demonstrate I with HEP for lumbopelvic mobility and stabilization for decreased back pain.  -ND     STG 1 Progress Progressing  -ND     STG 2 Pt to demonstrate lumbar right rotation and side bending to WFL w/ no c/o pain.  -ND     STG 2 Progress Progressing;Ongoing  -ND     STG 3 Pt to perform 10 bridges with no c/o back pain to demonstrate improved lumbopelvic stability.  -ND     STG 3 Progress Progressing;Ongoing  -ND     STG 4 Pt to demonstrate I with MET for self-innominate rotation correction.  -ND     STG 4 Progress Progressing  -ND        Long Term Goals    LTG Date to Achieve --  LTG deferred.  -ND        Time Calculation    PT Goal Re-Cert Due Date 09/06/23  -ND               User Key  (r) = Recorded By, (t) = Taken By, (c) = Cosigned By      Initials Name Provider Type    Chaz Martin, PT Physical Therapist                    Therapy Education  Education Details: Addition of unilateral hip abduction and PPT w/ adduction squeeze to HEP  Given: HEP  Program: New  How Provided: Verbal, Demonstration, Written  Provided to: Patient  Level of Understanding: Teach back education performed, Verbalized, Demonstrated              Time Calculation:   Start Time: 1102  Stop Time: 1148  Time Calculation (min): 46 min  Total Timed Code Minutes- PT: 42 " minute(s)  Timed Charges  90455 - PT Therapeutic Exercise Minutes: 42  Untimed Charges  PT Traction Rx Minutes: 4  Total Minutes  Timed Charges Total Minutes: 42  Untimed Charges Total Minutes: 4   Total Minutes: 46  Therapy Charges for Today       Code Description Service Date Service Provider Modifiers Qty    16772647323  PT THER PROC EA 15 MIN 9/1/2023 Chaz Underwood, PT GP 3                      Chaz Underwood, PT  9/1/2023

## 2023-09-05 ENCOUNTER — OFFICE VISIT (OUTPATIENT)
Dept: FAMILY MEDICINE CLINIC | Facility: CLINIC | Age: 28
End: 2023-09-05
Payer: COMMERCIAL

## 2023-09-05 VITALS
BODY MASS INDEX: 48.83 KG/M2 | HEART RATE: 75 BPM | SYSTOLIC BLOOD PRESSURE: 118 MMHG | HEIGHT: 64 IN | OXYGEN SATURATION: 98 % | WEIGHT: 286 LBS | DIASTOLIC BLOOD PRESSURE: 74 MMHG

## 2023-09-05 DIAGNOSIS — M54.50 CHRONIC BILATERAL LOW BACK PAIN WITHOUT SCIATICA: ICD-10-CM

## 2023-09-05 DIAGNOSIS — F41.9 ANXIETY: ICD-10-CM

## 2023-09-05 DIAGNOSIS — G93.2 IDIOPATHIC INTRACRANIAL HYPERTENSION: Primary | ICD-10-CM

## 2023-09-05 DIAGNOSIS — G89.29 CHRONIC BILATERAL LOW BACK PAIN WITHOUT SCIATICA: ICD-10-CM

## 2023-09-05 PROCEDURE — 1159F MED LIST DOCD IN RCRD: CPT | Performed by: FAMILY MEDICINE

## 2023-09-05 PROCEDURE — 99214 OFFICE O/P EST MOD 30 MIN: CPT | Performed by: FAMILY MEDICINE

## 2023-09-05 PROCEDURE — 1160F RVW MEDS BY RX/DR IN RCRD: CPT | Performed by: FAMILY MEDICINE

## 2023-09-05 NOTE — PROGRESS NOTES
Chief Complaint  idiopathic intracranial hypertension    Subjective    History of Present Illness {CC  Problem List  Visit  Diagnosis   Encounters  Notes  Medications  Labs  Result Review Imaging  Media :23}     Marleny Conn presents to [facility] [place] for     Chief Complaint   Patient presents with    idiopathic intracranial hypertension      Patient seen today for follow up.  Idiopathic intracranial hypertension controlled after shunt, follows with neurosurgery.  Anxiety symptoms managed with Prozac.  Continues to have low back.  Has tried physical therapy without much relief.  Back pain is limiting daily activities.       Current Outpatient Medications:     FLUoxetine (PROzac) 20 MG capsule, Take 1 capsule by mouth Daily., Disp: 90 capsule, Rfl: 1    norgestimate-ethinyl estradiol (Tri-Sprintec) 0.18/0.215/0.25 MG-35 MCG per tablet, Take 1 tablet by mouth Daily., Disp: 28 tablet, Rfl: 3    vitamin B-12 (CYANOCOBALAMIN) 1000 MCG tablet, TAKE 1 TABLET BY MOUTH DAILY., Disp: 90 tablet, Rfl: 1    vitamin D3 125 MCG (5000 UT) capsule capsule, Take 1 capsule by mouth 1 (One) Time Per Week., Disp: , Rfl:     acetaZOLAMIDE (DIAMOX) 500 MG capsule, , Disp: , Rfl:     aspirin 325 MG tablet, Take 1 tablet by mouth Daily. (Patient not taking: Reported on 9/5/2023), Disp: , Rfl:     buPROPion XL (Wellbutrin XL) 300 MG 24 hr tablet, Take 1 tablet by mouth Every Morning. (Patient not taking: Reported on 9/5/2023), Disp: 30 tablet, Rfl: 2    cetirizine (zyrTEC) 10 MG tablet, Take 1 tablet by mouth Daily. (Patient not taking: Reported on 9/5/2023), Disp: , Rfl:     clopidogrel (PLAVIX) 75 MG tablet, Take 1 tablet by mouth Daily. (Patient not taking: Reported on 9/5/2023), Disp: 30 tablet, Rfl: 6    dicyclomine (Bentyl) 10 MG capsule, Take 1 capsule by mouth 4 (Four) Times a Day Before Meals & at Bedtime. (Patient not taking: Reported on 9/5/2023), Disp: 120  "capsule, Rfl: 1    hydroCHLOROthiazide (HYDRODIURIL) 25 MG tablet, Take 1 tablet by mouth Daily. (Patient not taking: Reported on 9/5/2023), Disp: , Rfl:     omeprazole (priLOSEC) 40 MG capsule, Take 1 capsule by mouth Daily. (Patient not taking: Reported on 9/5/2023), Disp: 90 capsule, Rfl: 1     Objective       Vital Signs:   /74   Pulse 75   Ht 162.6 cm (64\")   Wt 130 kg (286 lb)   SpO2 98%   BMI 49.09 kg/m²     Physical Exam   Result Review :{ Labs  Result Review  Imaging  Med Tab  Media :23}   The following data was reviewed by: Kayleen Castro MD on 09/05/2023    Common labs          1/3/2023    07:47 5/12/2023    11:26 6/20/2023    13:25   Common Labs   Glucose 91      BUN 10      Creatinine 0.87      Sodium 138      Potassium 4.2      Chloride 102      Calcium 9.1      WBC  8.4     10.6       Hemoglobin  13.7     13.0       Hematocrit  40.5     40.7       Platelets  244     269          Details          This result is from an external source.              Xray lumbar spine 02/13/23  \"IMPRESSION:  CONCLUSION:  Congenitally short pedicles.  Mild retrolisthesis of L4 on L5.  Bilateral L5 spondylolysis with grade 1 spondylolisthesis L5 on  S1.  Spina bifida occulta L5.\"           Assessment and Plan {CC Problem List  Visit Diagnosis  ROS  Review (Popup)  Health Maintenance  Quality  BestPractice  Medications  SmartSets  SnapShot Encounters  Media :23}   Diagnoses and all orders for this visit:    1. Idiopathic intracranial hypertension (Primary)    2. Anxiety    3. Chronic bilateral low back pain without sciatica  -     MRI Lumbar Spine Without Contrast; Future       Idiopathic intracranial hypertension  Managed with shunt  Following with neurosurgery  Anxiety symptoms ok, continue Prozac  Persistent low back pain  Trial of conservative measures including physical therapy  MRI ordered for additional evaluation      Follow Up {Instructions Charge Capture  Follow-up Communications " :23}   Return in about 4 weeks (around 10/3/2023) for Recheck.  Patient was given instructions and counseling regarding her condition or for health maintenance advice. Please see specific information pulled into the AVS if appropriate.            This document has been electronically signed by Kayleen Castro MD

## 2023-09-07 ENCOUNTER — HOSPITAL ENCOUNTER (OUTPATIENT)
Dept: PHYSICAL THERAPY | Facility: HOSPITAL | Age: 28
Setting detail: THERAPIES SERIES
Discharge: HOME | End: 2023-09-07
Payer: COMMERCIAL

## 2023-09-07 DIAGNOSIS — M54.50 CHRONIC BILATERAL LOW BACK PAIN WITHOUT SCIATICA: Primary | ICD-10-CM

## 2023-09-07 DIAGNOSIS — G89.29 CHRONIC BILATERAL LOW BACK PAIN WITHOUT SCIATICA: Primary | ICD-10-CM

## 2023-09-07 PROCEDURE — 97110 THERAPEUTIC EXERCISES: CPT

## 2023-09-07 NOTE — THERAPY DISCHARGE NOTE
Outpatient Physical Therapy Ortho Treatment Note/Discharge Summary  [facility]     Patient Name: Marleny Conn  : 1995  MRN: [id number]  Today's Date: 2023      Visit Date: 2023    Subjective Improvement 0  Visits 6/6  Visits approved eval +6  RTMD MFR later this month  Recert Date 2023    Acute chronic right side low back pain    Visit Dx:    ICD-10-CM ICD-9-CM   1. Chronic bilateral low back pain without sciatica  M54.50 724.2    G89.29 338.29       Patient Active Problem List   Diagnosis    Morbidly obese    Epigastric pain    Change in bowel habits    Diarrhea    Generalized abdominal pain    Menorrhagia with irregular cycle    Pelvic pain    Gastroesophageal reflux disease with esophagitis without hemorrhage    Early satiety    Chronic tonsillitis    Tonsillith        Past Medical History:   Diagnosis Date    Acid reflux     Allergic rhinitis     Anxiety     Benign intracranial hypertension     Bronchitis     pt denies    Common cold     Conjunctivitis     Dysfunction of eustachian tube     Examination of eyes and vision     general; NORMAL    Gastroenteritis     Headache     Hemorrhoids     likely    Infection of skin and subcutaneous tissue     Migraine     Nonvenomous insect bite     Open wound of lower limb     Pharyngitis     Rhinitis     Tick bite     URI (upper respiratory infection)         Past Surgical History:   Procedure Laterality Date    COLONOSCOPY N/A 1/15/2020    Procedure: COLONOSCOPY;  Surgeon: Sterling Neal MD;  Location: [facility] ENDOSCOPY;  Service: Gastroenterology    ENDOSCOPY N/A 2019    Procedure: ESOPHAGOGASTRODUODENOSCOPY;  Surgeon: Sterling Neal MD;  Location: [facility] ENDOSCOPY;  Service: Gastroenterology    SALPINGECTOMY Bilateral 2018    Procedure: SALPINGECTOMY LAPAROSCOPIC;  Surgeon: FridayElizabeth MD;  Location: [facility] OR;  Service: Obstetrics/Gynecology    TONSILLECTOMY Bilateral 1/3/2023    Procedure: TONSILLECTOMY;   "Surgeon: Tasneem Cruz MD;  Location: [facility];  Service: ENT;  Laterality: Bilateral;    WISDOM TOOTH EXTRACTION          PT Ortho       Row Name 09/07/23 1500       Subjective Pain    Able to rate subjective pain? yes  -CP    Pre-Treatment Pain Level 10  -CP       Posture/Observations    Posture/Observations Comments normal gait and transfer.  wearing crocs  -CP              User Key  (r) = Recorded By, (t) = Taken By, (c) = Cosigned By      Initials Name Provider Type    Dominique Nguyen, [employer] Physical Therapist Assistant                                 PT Assessment/Plan       Row Name 09/07/23 1559          PT Assessment    Assessment Comments Patient has voiced no improvement with therapy and she is scheduled for an MRI this month.  She cont to rate very high pain.  She does have a normal gait pattern..  Patient is wearing crocs.  she was encouraged to wear a more supported shoe.  -CP        PT Plan    PT Plan Comments d/C  to home with HEP  -CP               User Key  (r) = Recorded By, (t) = Taken By, (c) = Cosigned By      Initials Name Provider Type    Dominique Nguyen PTA Physical Therapist Assistant                         OP Exercises       Row Name 09/07/23 1602 09/07/23 1500          Subjective Comments    Subjective Comments -- Patient reports no improvement with therapy.  She states that MD ordered an MRI and told her to finish her last 2 PT visits then stop therapy.  She reports pain with sitting, walking and standing.  She has pain at all times  -CP        Subjective Pain    Able to rate subjective pain? -- yes  -CP     Pre-Treatment Pain Level -- 10  -CP     Post-Treatment Pain Level -- 8  -CP        Total Minutes    23356 - PT Therapeutic Exercise Minutes 40  -CP --        Exercise 1    Exercise Name 1 -- Pro II level 2  -CP     Time 1 -- 10  -CP        Exercise 2    Exercise Name 2 -- incline stretch  -CP     Cueing 2 -- Verbal;Demo  -CP     Sets 2 -- 3  -CP     Time 2 -- 30\" holds  " "-CP        Exercise 3    Exercise Name 3 -- mini squats  -CP     Sets 3 -- 2  -CP     Reps 3 -- 10  -CP        Exercise 4    Exercise Name 4 -- standing supported hip circles  -CP     Cueing 4 -- Verbal;Demo  -CP     Sets 4 -- 1  -CP     Reps 4 -- 5  -CP     Time 4 -- B LE  -CP        Exercise 5    Exercise Name 5 -- cat and camel  -CP     Cueing 5 -- Verbal;Demo  -CP     Sets 5 -- 2  -CP     Reps 5 -- 10  -CP        Exercise 6    Exercise Name 6 -- bridging with holds  -CP     Cueing 6 -- Verbal;Demo  -CP     Sets 6 -- 2  -CP     Reps 6 -- 10  -CP     Time 6 -- 3\" holds  -CP        Exercise 7    Exercise Name 7 -- supine hip AD squeezes with TA  -CP     Cueing 7 -- Verbal;Demo  -CP     Sets 7 -- 2  -CP     Reps 7 -- 10  -CP     Time 7 -- 5\" holds  -CP               User Key  (r) = Recorded By, (t) = Taken By, (c) = Cosigned By      Initials Name Provider Type    CP Dominique Cohn, PTA Physical Therapist Assistant                                    PT OP Goals       Row Name 09/07/23 1500          PT Short Term Goals    STG Date to Achieve 09/06/23  -CP     STG 1 Pt to demonstrate I with HEP for lumbopelvic mobility and stabilization for decreased back pain.  -CP     STG 1 Progress Progressing  -CP     STG 2 Pt to demonstrate lumbar right rotation and side bending to WFL w/ no c/o pain.  -CP     STG 2 Progress Progressing;Ongoing  -CP     STG 3 Pt to perform 10 bridges with no c/o back pain to demonstrate improved lumbopelvic stability.  -CP     STG 3 Progress Progressing;Ongoing  -CP     STG 4 Pt to demonstrate I with MET for self-innominate rotation correction.  -CP     STG 4 Progress Progressing  -CP        Long Term Goals    LTG Date to Achieve --  LTG deferred.  -CP        Time Calculation    PT Goal Re-Cert Due Date 09/06/23  -CP               User Key  (r) = Recorded By, (t) = Taken By, (c) = Cosigned By      Initials Name Provider Type    Dominique Nguyen, PTA Physical Therapist Assistant              "       Therapy Education  Education Details: cat and camel  Given: HEP  Program: New  How Provided: Verbal, Demonstration, Written  Provided to: Patient  Level of Understanding: Teach back education performed, Verbalized, Demonstrated              Time Calculation:   Start Time: 1515  Stop Time: 1555  Time Calculation (min): 40 min  Total Timed Code Minutes- PT: 40 minute(s)  Timed Charges  73974 - PT Therapeutic Exercise Minutes: 40  Total Minutes  Timed Charges Total Minutes: 40   Total Minutes: 40  Therapy Charges for Today       Code Description Service Date Service Provider Modifiers Qty    79526990856 HC PT THER PROC EA 15 MIN 9/7/2023 Dominique Cohn, PTA GP 3                  OP PT Discharge Summary  Date of Discharge: 09/07/23  Reason for Discharge: Lack of progress  Outcomes Achieved: Unable to make functional progress toward goals at this time  Discharge Destination: Home with home program  Discharge Instructions/Additional Comments: Patient is D/C today due to lack of progress and 0% improvement.  She will have an MRI later this month      Dominique Cohn PTA  9/7/2023

## 2023-09-13 ENCOUNTER — APPOINTMENT (OUTPATIENT)
Dept: PHYSICAL THERAPY | Facility: HOSPITAL | Age: 28
End: 2023-09-13
Payer: COMMERCIAL

## 2023-09-25 ENCOUNTER — HOSPITAL ENCOUNTER (OUTPATIENT)
Dept: MRI IMAGING | Facility: HOSPITAL | Age: 28
Discharge: HOME | End: 2023-09-25
Admitting: FAMILY MEDICINE
Payer: COMMERCIAL

## 2023-09-25 DIAGNOSIS — G89.29 CHRONIC BILATERAL LOW BACK PAIN WITHOUT SCIATICA: ICD-10-CM

## 2023-09-25 DIAGNOSIS — M54.50 CHRONIC BILATERAL LOW BACK PAIN WITHOUT SCIATICA: ICD-10-CM

## 2023-09-25 PROCEDURE — 72148 MRI LUMBAR SPINE W/O DYE: CPT

## (undated) DEVICE — COAGULATOR SXN MEGADYNE FT/CONTRL 10F 6IN

## (undated) DEVICE — SKIN AFFIX SURG ADHESIVE 72/CS 0.55ML: Brand: MEDLINE

## (undated) DEVICE — GOWN,AURORA,NOREINF,RAGLAN,XL,STERILE: Brand: MEDLINE

## (undated) DEVICE — UNDRPD BREATH 23X36 BG/10

## (undated) DEVICE — SINGLE-USE BIOPSY FORCEPS: Brand: RADIAL JAW 4

## (undated) DEVICE — SUT VIC 0/0 UR6 27IN DYED J603H

## (undated) DEVICE — PREP SOL POVIDONE/IODINE BT 4OZ

## (undated) DEVICE — DEFOGGER!" ANTI FOG KIT: Brand: DEROYAL

## (undated) DEVICE — SALEM SUMP DUAL LUMEN STOMACH TUBE WITH ENFIT CONNECTION: Brand: SALEM SUMP

## (undated) DEVICE — ENSEAL LAPAROSCOPIC TISSUE SEALER G2 ARTICULATING  CURVED JAW FOR USE WITH G2 GENERATOR 5MM DIAMETER 35CM SHAFT LENGTH: Brand: ENSEAL

## (undated) DEVICE — STERILE POLYISOPRENE POWDER-FREE SURGICAL GLOVES WITH EMOLLIENT COATING: Brand: PROTEXIS

## (undated) DEVICE — MAD T & A: Brand: MEDLINE INDUSTRIES, INC.

## (undated) DEVICE — APPL CHLORAPREP W/TINT 26ML ORNG

## (undated) DEVICE — GLV SURG SENSICARE POLYISPRN W/ALOE PF LF 6.5 GRN STRL

## (undated) DEVICE — MONOPOLAR METZENBAUM SCISSOR TIP, DISPOSABLE: Brand: MONOPOLAR METZENBAUM SCISSOR TIP, DISPOSABLE

## (undated) DEVICE — GLV SURG SIGNATURE ESSENTIAL PF LTX SZ6.5

## (undated) DEVICE — ENDOPATH XCEL BLADELESS TROCARS WITH STABILITY SLEEVES: Brand: ENDOPATH XCEL

## (undated) DEVICE — NDL HYPO SFTY GLD 22G 1 1/2IN

## (undated) DEVICE — MANIP UTER KRONNER

## (undated) DEVICE — CANN SMPL SOFTECH BIFLO ETCO2 A/M 7FT

## (undated) DEVICE — PENCL ES MEGADINE EZ/CLEAN BUTN W/HOLSTR 10FT

## (undated) DEVICE — GLV SURG SENSICARE PI PF LF 7 GRN STRL

## (undated) DEVICE — ELECTRD BLD EZ CLN MOD XLNG 2.75IN

## (undated) DEVICE — GLV SURG SENSICARE ALOE LF PF SZ7.5 GRN

## (undated) DEVICE — SYR LL TP 10ML STRL

## (undated) DEVICE — GLV SURG TRIUMPH LT PF LTX 6.5 STRL

## (undated) DEVICE — SOL IRR NACL 0.9PCT BT 1000ML

## (undated) DEVICE — PREP PVP-I 7.5P BT 4OZ

## (undated) DEVICE — PK LAP GYN 60

## (undated) DEVICE — BITEBLOCK ENDO W/STRAP 60F A/ LF DISP

## (undated) DEVICE — SUT MNCRYL 3/0 Y936H

## (undated) DEVICE — GLV SURG SENSICARE POLYISPRN W/ALOE PF LF 6 GRN STRL

## (undated) DEVICE — SPONGE,TONSIL,DBL STRNG,XRAY,MED,1",STRL: Brand: MEDLINE INDUSTRIES, INC.